# Patient Record
Sex: FEMALE | Race: WHITE | Employment: PART TIME | URBAN - METROPOLITAN AREA
[De-identification: names, ages, dates, MRNs, and addresses within clinical notes are randomized per-mention and may not be internally consistent; named-entity substitution may affect disease eponyms.]

---

## 2017-04-04 ENCOUNTER — OFFICE VISIT (OUTPATIENT)
Dept: INTERNAL MEDICINE CLINIC | Facility: CLINIC | Age: 57
End: 2017-04-04

## 2017-04-04 VITALS
BODY MASS INDEX: 34.52 KG/M2 | DIASTOLIC BLOOD PRESSURE: 68 MMHG | HEART RATE: 102 BPM | WEIGHT: 207.19 LBS | RESPIRATION RATE: 18 BRPM | HEIGHT: 65 IN | OXYGEN SATURATION: 99 % | TEMPERATURE: 99 F | SYSTOLIC BLOOD PRESSURE: 130 MMHG

## 2017-04-04 DIAGNOSIS — J45.20 ASTHMATIC BRONCHITIS, MILD INTERMITTENT, UNCOMPLICATED: Primary | ICD-10-CM

## 2017-04-04 PROCEDURE — 99213 OFFICE O/P EST LOW 20 MIN: CPT | Performed by: INTERNAL MEDICINE

## 2017-04-04 RX ORDER — PROMETHAZINE HYDROCHLORIDE AND CODEINE PHOSPHATE 6.25; 1 MG/5ML; MG/5ML
2.5 SYRUP ORAL EVERY 4 HOURS PRN
Qty: 180 ML | Refills: 0 | Status: SHIPPED | OUTPATIENT
Start: 2017-04-04 | End: 2017-04-15

## 2017-04-04 RX ORDER — INFLUENZA VIRUS VACCINE 15; 15; 15; 15 UG/.5ML; UG/.5ML; UG/.5ML; UG/.5ML
SUSPENSION INTRAMUSCULAR
Refills: 0 | COMMUNITY
Start: 2017-01-05 | End: 2017-04-04 | Stop reason: ALTCHOICE

## 2017-04-04 RX ORDER — AMOXICILLIN 875 MG/1
TABLET, COATED ORAL
COMMUNITY
Start: 2017-04-01 | End: 2017-05-09 | Stop reason: ALTCHOICE

## 2017-04-04 RX ORDER — HYDROCODONE BITARTRATE AND ACETAMINOPHEN 5; 325 MG/1; MG/1
TABLET ORAL
COMMUNITY
Start: 2017-04-01 | End: 2017-10-11

## 2017-04-04 RX ORDER — AZITHROMYCIN 250 MG/1
TABLET, FILM COATED ORAL
Qty: 6 TABLET | Refills: 0 | Status: SHIPPED | OUTPATIENT
Start: 2017-04-04 | End: 2017-05-09 | Stop reason: ALTCHOICE

## 2017-04-04 RX ORDER — LORAZEPAM 0.5 MG/1
0.5 TABLET ORAL 2 TIMES DAILY PRN
Qty: 20 TABLET | Refills: 0 | Status: SHIPPED | OUTPATIENT
Start: 2017-04-04 | End: 2017-11-03 | Stop reason: ALTCHOICE

## 2017-04-04 RX ORDER — AZITHROMYCIN 250 MG/1
TABLET, FILM COATED ORAL
Qty: 6 TABLET | Refills: 0 | Status: SHIPPED | OUTPATIENT
Start: 2017-04-04 | End: 2017-04-04

## 2017-04-04 NOTE — PROGRESS NOTES
HPI:    Patient ID: Abdulaziz aSwant is a 64year old female. HPIpt here for cough chest congestion and wheezing for almost a week. Is on Amoxil for root canal with Amoxil 875 mg bid. Review of Systems   Constitutional: Positive for fatigue.  Claudia Cover Referrals:  None       YR#[id number]

## 2017-04-15 ENCOUNTER — PATIENT MESSAGE (OUTPATIENT)
Dept: INTERNAL MEDICINE CLINIC | Facility: CLINIC | Age: 57
End: 2017-04-15

## 2017-04-15 NOTE — TELEPHONE ENCOUNTER
From: Stephane Alvarez  To: Kavitha Acosta MD  Sent: 4/15/2017 10:38 AM CDT  Subject: Prescription Question    good morning I'm finally starting to feel somewhat better but I'm out of cough syrup was wondering if i could a refill   thanks   kina iniguez

## 2017-04-17 RX ORDER — PROMETHAZINE HYDROCHLORIDE AND CODEINE PHOSPHATE 6.25; 1 MG/5ML; MG/5ML
2.5 SYRUP ORAL EVERY 4 HOURS PRN
Qty: 180 ML | Refills: 0 | Status: SHIPPED | OUTPATIENT
Start: 2017-04-17 | End: 2017-10-11

## 2017-04-27 ENCOUNTER — TELEPHONE (OUTPATIENT)
Dept: INTERNAL MEDICINE CLINIC | Facility: CLINIC | Age: 57
End: 2017-04-27

## 2017-05-09 ENCOUNTER — OFFICE VISIT (OUTPATIENT)
Dept: INTERNAL MEDICINE CLINIC | Facility: CLINIC | Age: 57
End: 2017-05-09

## 2017-05-09 VITALS
TEMPERATURE: 98 F | BODY MASS INDEX: 35 KG/M2 | SYSTOLIC BLOOD PRESSURE: 148 MMHG | HEART RATE: 71 BPM | OXYGEN SATURATION: 99 % | DIASTOLIC BLOOD PRESSURE: 90 MMHG | WEIGHT: 211.38 LBS

## 2017-05-09 DIAGNOSIS — R94.6 ABNORMAL RESULTS OF THYROID FUNCTION STUDIES: ICD-10-CM

## 2017-05-09 DIAGNOSIS — F51.01 PRIMARY INSOMNIA: ICD-10-CM

## 2017-05-09 DIAGNOSIS — Z00.00 ROUTINE GENERAL MEDICAL EXAMINATION AT A HEALTH CARE FACILITY: ICD-10-CM

## 2017-05-09 DIAGNOSIS — R03.0 ELEVATED BP WITHOUT DIAGNOSIS OF HYPERTENSION: ICD-10-CM

## 2017-05-09 DIAGNOSIS — R00.2 PALPITATIONS: Primary | ICD-10-CM

## 2017-05-09 PROCEDURE — 83735 ASSAY OF MAGNESIUM: CPT | Performed by: INTERNAL MEDICINE

## 2017-05-09 PROCEDURE — 80048 BASIC METABOLIC PNL TOTAL CA: CPT | Performed by: INTERNAL MEDICINE

## 2017-05-09 PROCEDURE — 84436 ASSAY OF TOTAL THYROXINE: CPT | Performed by: INTERNAL MEDICINE

## 2017-05-09 PROCEDURE — 99214 OFFICE O/P EST MOD 30 MIN: CPT | Performed by: INTERNAL MEDICINE

## 2017-05-09 PROCEDURE — 36415 COLL VENOUS BLD VENIPUNCTURE: CPT | Performed by: INTERNAL MEDICINE

## 2017-05-09 RX ORDER — ZOLPIDEM TARTRATE 10 MG/1
5 TABLET ORAL NIGHTLY
Qty: 30 TABLET | Refills: 0 | Status: SHIPPED | OUTPATIENT
Start: 2017-05-09 | End: 2017-08-08

## 2017-05-09 NOTE — PROGRESS NOTES
Pt presented to clinic today for blood draw. Per physician able to draw orders. Orders  documented within chart. Pt tolerated lab draw well.  verified.   Orders drawn include: t4, bmp, mg   Site of draw: rt catalino Mao CMA

## 2017-05-09 NOTE — PROGRESS NOTES
HPI:    Patient ID: Jersey Hinton is a 62year old female. HPIPt here for evaluation of rapid heart rates noted on her fit bit application. The rates go up to 170 on occasuion and no associated symptoms. Never with exercise.   No assocaited symto place, and time. She displays normal reflexes. Skin: No rash noted.    Psychiatric:   Very anxious apearing              ASSESSMENT/PLAN:   Palpitations  (primary encounter diagnosis) etiology likely anxiety - however will check lytes and tsh and refer fo

## 2017-05-16 ENCOUNTER — HOSPITAL ENCOUNTER (OUTPATIENT)
Dept: CV DIAGNOSTICS | Facility: HOSPITAL | Age: 57
Discharge: HOME | End: 2017-05-16
Attending: INTERNAL MEDICINE
Payer: COMMERCIAL

## 2017-05-16 DIAGNOSIS — R00.2 PALPITATIONS: ICD-10-CM

## 2017-05-16 PROCEDURE — 93227 XTRNL ECG REC<48 HR R&I: CPT | Performed by: INTERNAL MEDICINE

## 2017-05-18 ENCOUNTER — HOSPITAL ENCOUNTER (OUTPATIENT)
Dept: CV DIAGNOSTICS | Facility: HOSPITAL | Age: 57
Discharge: HOME | End: 2017-05-18
Attending: INTERNAL MEDICINE
Payer: COMMERCIAL

## 2017-05-18 PROCEDURE — 93225 XTRNL ECG REC<48 HRS REC: CPT | Performed by: INTERNAL MEDICINE

## 2017-05-25 PROCEDURE — 85025 COMPLETE CBC W/AUTO DIFF WBC: CPT | Performed by: FAMILY MEDICINE

## 2017-05-25 NOTE — PROGRESS NOTES
Shantell Ziegler is a 62year old female. CC:  Patient presents with: Other: Patient complains of hand tremors, dry throat, feeling light headed due to nervousiness or anxiety      HPI:    Pt c/o feeling lightheaded/ shakey lately.  Has been under ve History:  Past Medical History   Diagnosis Date   • Fibromyalgia    • Arthritis           Past Surgical History    REPAIR ROTATOR CUFF,ACUTE  2004    ELECTROCARDIOGRAM, COMPLETE  09/05/2013    Comment SCANNED TO MEDIA TAB - 09/05/2013    HYSTERECTOMY auscultation bilaterally  GI: Soft, non-tender, no rebound, no guarding.   PSYCH:  Alert and oriented x 3, affect appropriate, very ANXIOUS   SKIN: No rashes, no lesions  EXTREMITIES:  Bilaterally warm, no edema, no rashes  LYMPH:  No cervical lymphadenopat this encounter.      None

## 2017-05-26 ENCOUNTER — TELEPHONE (OUTPATIENT)
Dept: INTERNAL MEDICINE CLINIC | Facility: CLINIC | Age: 57
End: 2017-05-26

## 2017-05-26 NOTE — TELEPHONE ENCOUNTER
Pt's  verified  Pt states she received a nazia today from Dr. Dyllan Durbin re labs- has more questions about menopause results please call at  [phone]- informed Nurse not in office until Wednesday and that MD is not in office today as well.

## 2017-05-30 ENCOUNTER — PATIENT MESSAGE (OUTPATIENT)
Dept: INTERNAL MEDICINE CLINIC | Facility: CLINIC | Age: 57
End: 2017-05-30

## 2017-05-30 NOTE — TELEPHONE ENCOUNTER
From: Sravanthi Rodarte  To: Cecille Stack MD  Sent: 5/30/2017 6:03 AM CDT  Subject: Test Results Question    Did the blood test show menopause   Thanks   Berkley Sorensen

## 2017-06-21 RX ORDER — FLUTICASONE PROPIONATE 50 MCG
SPRAY, SUSPENSION (ML) NASAL
Qty: 1 BOTTLE | Refills: 6 | Status: SHIPPED | OUTPATIENT
Start: 2017-06-21 | End: 2019-10-04

## 2017-08-08 RX ORDER — ZOLPIDEM TARTRATE 10 MG/1
5 TABLET ORAL NIGHTLY
Qty: 30 TABLET | Refills: 0
Start: 2017-08-08 | End: 2017-08-17

## 2017-08-08 NOTE — TELEPHONE ENCOUNTER
From: Margaret Elias  Sent: 8/8/2017 7:06 AM CDT  Subject: Medication Renewal Request    Mariluz Lawson would like a refill of the following medications:  Zolpidem Tartrate 10 MG Oral Tab Bernardo Corrales MD]    Preferred pharmacy: [facility]ACY #[id number]

## 2017-08-18 RX ORDER — ZOLPIDEM TARTRATE 10 MG/1
TABLET ORAL
Qty: 30 TABLET | Refills: 0 | Status: SHIPPED | OUTPATIENT
Start: 2017-08-18 | End: 2019-09-06

## 2017-10-09 ENCOUNTER — OFFICE VISIT (OUTPATIENT)
Dept: FAMILY MEDICINE CLINIC | Facility: CLINIC | Age: 57
End: 2017-10-09

## 2017-10-09 VITALS
BODY MASS INDEX: 37 KG/M2 | TEMPERATURE: 98 F | OXYGEN SATURATION: 98 % | DIASTOLIC BLOOD PRESSURE: 80 MMHG | SYSTOLIC BLOOD PRESSURE: 130 MMHG | WEIGHT: 223 LBS | HEART RATE: 86 BPM | RESPIRATION RATE: 16 BRPM

## 2017-10-09 DIAGNOSIS — L85.3 DRY SKIN: ICD-10-CM

## 2017-10-09 DIAGNOSIS — L30.9 DERMATITIS: Primary | ICD-10-CM

## 2017-10-09 PROCEDURE — 99213 OFFICE O/P EST LOW 20 MIN: CPT | Performed by: PHYSICIAN ASSISTANT

## 2017-10-09 NOTE — PROGRESS NOTES
CHIEF COMPLAINT:   Patient presents with:  Derm Problem: started a week ago on right foot, a couple areas she was itching, mild area on left wrist      HPI:   Sravanthi Rodarte is a 62year old female who presents for evaluation of a skin rash .  Patient Past Medical History:   Diagnosis Date   • Arthritis    • Fibromyalgia       Past Surgical History:  2005: CHOLECYSTECTOMY      Comment: Open elidia.    09/05/2013: ELECTROCARDIOGRAM, COMPLETE      Comment: SCANNED TO MEDIA TAB - 09/05/2013  2008: Tash Le cervical, submandibular, or supraclavicular lymphadenopathy. No results found for this or any previous visit (from the past 24 hour(s)).     ASSESSMENT:   Gillian Turk is a 62year old female who presented for evaluation of a dermatological issu

## 2017-10-11 ENCOUNTER — OFFICE VISIT (OUTPATIENT)
Dept: INTERNAL MEDICINE CLINIC | Facility: CLINIC | Age: 57
End: 2017-10-11

## 2017-10-11 VITALS
RESPIRATION RATE: 14 BRPM | DIASTOLIC BLOOD PRESSURE: 82 MMHG | HEART RATE: 80 BPM | HEIGHT: 65 IN | SYSTOLIC BLOOD PRESSURE: 124 MMHG | TEMPERATURE: 98 F | OXYGEN SATURATION: 96 % | BODY MASS INDEX: 36.49 KG/M2 | WEIGHT: 219 LBS

## 2017-10-11 DIAGNOSIS — R21 RASH AND NONSPECIFIC SKIN ERUPTION: Primary | ICD-10-CM

## 2017-10-11 PROCEDURE — 99213 OFFICE O/P EST LOW 20 MIN: CPT | Performed by: FAMILY MEDICINE

## 2017-10-11 PROCEDURE — 85025 COMPLETE CBC W/AUTO DIFF WBC: CPT | Performed by: FAMILY MEDICINE

## 2017-10-11 PROCEDURE — 36415 COLL VENOUS BLD VENIPUNCTURE: CPT | Performed by: FAMILY MEDICINE

## 2017-10-11 NOTE — PATIENT INSTRUCTIONS
Cont hydrocortisone, please call if worsening or changing symptoms    Will call tmw with blood test results     Can cont zyrtec

## 2017-10-11 NOTE — PROGRESS NOTES
CC:  Derm Problem (c/c body rash spotting x 1 week)      Hx of CC:    Faint rash legs / arms for a week  A little itchy  In sar[place] a week ago  Feels well otherwise- no fevers , body aches, headaches  No new creams/ lotions/ detergents   Was prescribed hydr [OTHER] Other      hashimoto   • Diabetes Maternal Grandmother    • Heart Disease Maternal Grandmother      CAD   • Cancer Father         Relation Status Comments   Mo Alive    Other Alive    Fa Alive       Smoking status: Former Smoker Encounter      CBC W Differential W Platelet [E]

## 2017-10-18 ENCOUNTER — LAB ENCOUNTER (OUTPATIENT)
Dept: LAB | Age: 57
End: 2017-10-18
Attending: DERMATOLOGY
Payer: COMMERCIAL

## 2017-10-18 DIAGNOSIS — L30.9 DERMATITIS: Primary | ICD-10-CM

## 2017-10-18 PROCEDURE — 81003 URINALYSIS AUTO W/O SCOPE: CPT

## 2017-10-18 PROCEDURE — 36415 COLL VENOUS BLD VENIPUNCTURE: CPT

## 2017-10-18 PROCEDURE — 80053 COMPREHEN METABOLIC PANEL: CPT

## 2017-11-03 ENCOUNTER — TELEPHONE (OUTPATIENT)
Dept: INTERNAL MEDICINE CLINIC | Facility: CLINIC | Age: 57
End: 2017-11-03

## 2017-11-03 ENCOUNTER — OFFICE VISIT (OUTPATIENT)
Dept: INTERNAL MEDICINE CLINIC | Facility: CLINIC | Age: 57
End: 2017-11-03

## 2017-11-03 VITALS
HEART RATE: 71 BPM | DIASTOLIC BLOOD PRESSURE: 90 MMHG | HEIGHT: 65 IN | OXYGEN SATURATION: 97 % | WEIGHT: 230 LBS | BODY MASS INDEX: 38.32 KG/M2 | TEMPERATURE: 98 F | SYSTOLIC BLOOD PRESSURE: 132 MMHG

## 2017-11-03 DIAGNOSIS — F50.9 EATING DISORDER: ICD-10-CM

## 2017-11-03 DIAGNOSIS — F41.9 ANXIETY AND DEPRESSION: ICD-10-CM

## 2017-11-03 DIAGNOSIS — F32.A ANXIETY AND DEPRESSION: ICD-10-CM

## 2017-11-03 DIAGNOSIS — Z12.39 SCREENING FOR BREAST CANCER: ICD-10-CM

## 2017-11-03 DIAGNOSIS — M75.81 TENDINITIS OF RIGHT ROTATOR CUFF: Primary | ICD-10-CM

## 2017-11-03 PROCEDURE — 99214 OFFICE O/P EST MOD 30 MIN: CPT | Performed by: INTERNAL MEDICINE

## 2017-11-03 RX ORDER — MELOXICAM 15 MG/1
15 TABLET ORAL DAILY PRN
Qty: 30 TABLET | Refills: 0 | Status: SHIPPED | OUTPATIENT
Start: 2017-11-03 | End: 2018-01-18

## 2017-11-03 RX ORDER — CYCLOBENZAPRINE HCL 10 MG
10 TABLET ORAL NIGHTLY PRN
Qty: 10 TABLET | Refills: 0 | Status: SHIPPED | OUTPATIENT
Start: 2017-11-03 | End: 2018-01-18

## 2017-11-03 NOTE — PROGRESS NOTES
Shantell Ziegler is a 62year old female.     Chief complaint:  Right shoulder pain and anxiety   HPI:   62year old female with PMH as listed below here for   Right shoulder pain   Started   1 month ago   Works at [employer]   Pain 10/10   No numbne History:   Diagnosis Date   • Arthritis    • Fibromyalgia      Past Surgical History:  2005: CHOLECYSTECTOMY      Comment: Open elidia.    09/05/2013: ELECTROCARDIOGRAM, COMPLETE      Comment: SCANNED TO MEDIA TAB - 09/05/2013  2008: HYSTERECTOMY      Commen no gross deficits              No orders of the defined types were placed in this encounter. ASSESSMENT AND PLAN:   1. Tendinitis of right rotator cuff  mobic   - XR SHOULDER, COMPLETE (MIN 2 VIEWS), RIGHT (CPT=73030);  Future  - PHYSICAL THERAPY - I

## 2017-11-03 NOTE — TELEPHONE ENCOUNTER
Called patient she was prescribed Meloxicam 15mg Anti-inflammatory pain reliever, Flexeril muscle relaxant  and the sertraline, she did not see all three meds on her AVS upon review

## 2017-11-06 ENCOUNTER — TELEPHONE (OUTPATIENT)
Dept: INTERNAL MEDICINE CLINIC | Facility: CLINIC | Age: 57
End: 2017-11-06

## 2017-11-08 ENCOUNTER — TELEPHONE (OUTPATIENT)
Dept: INTERNAL MEDICINE CLINIC | Facility: CLINIC | Age: 57
End: 2017-11-08

## 2017-11-08 ENCOUNTER — HOSPITAL ENCOUNTER (OUTPATIENT)
Dept: GENERAL RADIOLOGY | Age: 57
Discharge: HOME | End: 2017-11-08
Attending: INTERNAL MEDICINE
Payer: COMMERCIAL

## 2017-11-08 DIAGNOSIS — R03.0 ELEVATED BLOOD PRESSURE READING: Primary | ICD-10-CM

## 2017-11-08 DIAGNOSIS — R94.6 ABNORMAL RESULTS OF THYROID FUNCTION STUDIES: ICD-10-CM

## 2017-11-08 DIAGNOSIS — M75.81 TENDINITIS OF RIGHT ROTATOR CUFF: ICD-10-CM

## 2017-11-08 PROCEDURE — 73030 X-RAY EXAM OF SHOULDER: CPT | Performed by: INTERNAL MEDICINE

## 2017-11-09 ENCOUNTER — NURSE ONLY (OUTPATIENT)
Dept: INTERNAL MEDICINE CLINIC | Facility: CLINIC | Age: 57
End: 2017-11-09

## 2017-11-09 DIAGNOSIS — R94.6 ABNORMAL RESULTS OF THYROID FUNCTION STUDIES: ICD-10-CM

## 2017-11-09 DIAGNOSIS — R03.0 ELEVATED BLOOD PRESSURE READING: ICD-10-CM

## 2017-11-09 PROCEDURE — 36415 COLL VENOUS BLD VENIPUNCTURE: CPT | Performed by: INTERNAL MEDICINE

## 2017-11-09 PROCEDURE — 84443 ASSAY THYROID STIM HORMONE: CPT | Performed by: INTERNAL MEDICINE

## 2017-11-09 PROCEDURE — 80061 LIPID PANEL: CPT | Performed by: INTERNAL MEDICINE

## 2017-11-09 PROCEDURE — 82306 VITAMIN D 25 HYDROXY: CPT | Performed by: INTERNAL MEDICINE

## 2017-11-09 PROCEDURE — 83036 HEMOGLOBIN GLYCOSYLATED A1C: CPT | Performed by: INTERNAL MEDICINE

## 2017-11-09 PROCEDURE — 82607 VITAMIN B-12: CPT | Performed by: INTERNAL MEDICINE

## 2017-11-10 ENCOUNTER — OFFICE VISIT (OUTPATIENT)
Dept: INTERNAL MEDICINE CLINIC | Facility: CLINIC | Age: 57
End: 2017-11-10

## 2017-11-10 ENCOUNTER — TELEPHONE (OUTPATIENT)
Dept: INTERNAL MEDICINE CLINIC | Facility: CLINIC | Age: 57
End: 2017-11-10

## 2017-11-10 VITALS
OXYGEN SATURATION: 99 % | SYSTOLIC BLOOD PRESSURE: 136 MMHG | WEIGHT: 229 LBS | DIASTOLIC BLOOD PRESSURE: 82 MMHG | HEART RATE: 77 BPM | HEIGHT: 65 IN | BODY MASS INDEX: 38.15 KG/M2 | TEMPERATURE: 97 F

## 2017-11-10 DIAGNOSIS — M25.511 ACUTE PAIN OF RIGHT SHOULDER: ICD-10-CM

## 2017-11-10 DIAGNOSIS — F41.9 ANXIETY AND DEPRESSION: ICD-10-CM

## 2017-11-10 DIAGNOSIS — IMO0001 CLASS 2 OBESITY WITH SERIOUS COMORBIDITY AND BODY MASS INDEX (BMI) OF 38.0 TO 38.9 IN ADULT, UNSPECIFIED OBESITY TYPE: Primary | ICD-10-CM

## 2017-11-10 DIAGNOSIS — F32.A ANXIETY AND DEPRESSION: ICD-10-CM

## 2017-11-10 PROCEDURE — 99214 OFFICE O/P EST MOD 30 MIN: CPT | Performed by: INTERNAL MEDICINE

## 2017-11-10 RX ORDER — PHENTERMINE HYDROCHLORIDE 15 MG/1
15 CAPSULE ORAL EVERY MORNING
Qty: 30 CAPSULE | Refills: 0 | Status: SHIPPED | OUTPATIENT
Start: 2017-11-10 | End: 2019-04-15 | Stop reason: ALTCHOICE

## 2017-11-10 RX ORDER — PHENTERMINE HYDROCHLORIDE 15 MG/1
15 CAPSULE ORAL EVERY MORNING
Qty: 30 CAPSULE | Refills: 0 | Status: SHIPPED | OUTPATIENT
Start: 2017-11-10 | End: 2018-12-04

## 2017-11-10 RX ORDER — ERGOCALCIFEROL 1.25 MG/1
50000 CAPSULE ORAL WEEKLY
Qty: 12 CAPSULE | Refills: 0 | Status: SHIPPED | OUTPATIENT
Start: 2017-11-10 | End: 2018-01-27

## 2017-11-10 RX ORDER — PHENTERMINE HYDROCHLORIDE 15 MG/1
15 CAPSULE ORAL EVERY MORNING
Qty: 30 CAPSULE | Refills: 0 | Status: SHIPPED | OUTPATIENT
Start: 2017-11-10 | End: 2017-11-10

## 2017-11-10 NOTE — PATIENT INSTRUCTIONS
Randolph Edouard to [facility] group weight loss clinic. We are excited that you are committed to improving your health and have invited our practice to be part of your journey.  Our approach to the medical management of weight loss is si water per day, add fiber ( benefiber) to the water to increase fullness, overcome constipation  - portion control   - Eat slowly and take 20 to 30 minutes to complete each meal  - Do not drink your calories ( no regular pop, juice, high calorie coffee drin health!     1.___________________________________________________________    2.___________________________________________________________    3.___________________________________________________________    4.________________________________________________ pounds by this time next year!

## 2017-11-10 NOTE — PROGRESS NOTES
Tunde Goel is a 62year old female.     Chief complaint:weight loss management and obesity related comorbidities  HPI:   With PMH as listed below here for medical weight loss management    has been suffering her whole life   Gained more weight in t Rfl: 0   ZOLPIDEM TARTRATE 10 MG Oral Tab TAKE 1 TABLET BY MOUTH NIGHTLY Disp: 30 tablet Rfl: 0   FLUTICASONE PROPIONATE 50 MCG/ACT Nasal Suspension INSTILL 2 SPRAYS IN EACH NOSTRIL DAILY Disp: 1 Bottle Rfl: 6   omeprazole 20 MG Oral Capsule Delayed Jin negatives noted in the the HPI  GEN:  No fever or fatigue  HEAD:  No headaches  EYES:  No vision change  EARS:  No hearing loss  MOUTH/THROAT:  No sore throat or dental problems  HEART:  No chest pain or palpitations  LUNG:  No SOB, cough or wheeze  GI:  N calories ( no regular pop, juice, high calorie coffee drinks, limit alcohol)  - Do not eat late at night  - Exercise- at least 3 times per week ( goal is 150 min/week)  Will start phentermine discussed side effects including but not limited to:( elevated B

## 2017-11-16 ENCOUNTER — OFFICE VISIT (OUTPATIENT)
Dept: INTERNAL MEDICINE CLINIC | Facility: CLINIC | Age: 57
End: 2017-11-16

## 2017-11-16 VITALS
DIASTOLIC BLOOD PRESSURE: 80 MMHG | HEIGHT: 65 IN | WEIGHT: 225 LBS | SYSTOLIC BLOOD PRESSURE: 110 MMHG | BODY MASS INDEX: 37.49 KG/M2

## 2017-11-16 DIAGNOSIS — Z48.02 VISIT FOR SUTURE REMOVAL: Primary | ICD-10-CM

## 2017-11-16 PROCEDURE — 99211 OFF/OP EST MAY X REQ PHY/QHP: CPT | Performed by: FAMILY MEDICINE

## 2018-01-18 RX ORDER — MELOXICAM 15 MG/1
15 TABLET ORAL DAILY PRN
Qty: 30 TABLET | Refills: 0 | Status: SHIPPED
Start: 2018-01-18 | End: 2019-04-15 | Stop reason: ALTCHOICE

## 2018-01-18 RX ORDER — CYCLOBENZAPRINE HCL 10 MG
10 TABLET ORAL NIGHTLY PRN
Qty: 30 TABLET | Refills: 0 | Status: SHIPPED
Start: 2018-01-18 | End: 2021-12-16

## 2018-01-18 NOTE — TELEPHONE ENCOUNTER
From: Lou Murray  Sent: 1/18/2018 11:57 AM CST  Subject: Medication Renewal Request    Lucinda Luque would like a refill of the following medications:     Cyclobenzaprine HCl 10 MG Oral Tab Heather Howard MD]    [facility]

## 2018-02-01 ENCOUNTER — HOSPITAL ENCOUNTER (OUTPATIENT)
Dept: MAMMOGRAPHY | Age: 58
Discharge: HOME | End: 2018-02-01
Attending: INTERNAL MEDICINE
Payer: COMMERCIAL

## 2018-02-01 DIAGNOSIS — Z12.39 SCREENING FOR BREAST CANCER: ICD-10-CM

## 2018-02-01 PROCEDURE — 77067 SCR MAMMO BI INCL CAD: CPT | Performed by: INTERNAL MEDICINE

## 2018-02-05 ENCOUNTER — TELEPHONE (OUTPATIENT)
Dept: INTERNAL MEDICINE CLINIC | Facility: CLINIC | Age: 58
End: 2018-02-05

## 2018-02-05 NOTE — TELEPHONE ENCOUNTER
Pt wants to know what is going on with her having to come in for another test, please give her a call ASAP.

## 2018-02-12 ENCOUNTER — HOSPITAL ENCOUNTER (OUTPATIENT)
Dept: MAMMOGRAPHY | Facility: HOSPITAL | Age: 58
Discharge: HOME | End: 2018-02-12
Attending: INTERNAL MEDICINE
Payer: COMMERCIAL

## 2018-02-12 DIAGNOSIS — R92.8 ABNORMAL MAMMOGRAM: ICD-10-CM

## 2018-02-12 PROCEDURE — 77066 DX MAMMO INCL CAD BI: CPT | Performed by: INTERNAL MEDICINE

## 2018-06-08 RX ORDER — VALACYCLOVIR HYDROCHLORIDE 1 G/1
TABLET, FILM COATED ORAL
Qty: 48 TABLET | Refills: 0 | Status: SHIPPED | OUTPATIENT
Start: 2018-06-08 | End: 2018-06-28

## 2018-06-29 RX ORDER — VALACYCLOVIR HYDROCHLORIDE 1 G/1
TABLET, FILM COATED ORAL
Qty: 48 TABLET | Refills: 0 | Status: SHIPPED | OUTPATIENT
Start: 2018-06-29 | End: 2018-07-25

## 2018-07-25 RX ORDER — VALACYCLOVIR HYDROCHLORIDE 1 G/1
TABLET, FILM COATED ORAL
Qty: 48 TABLET | Refills: 0 | Status: SHIPPED | OUTPATIENT
Start: 2018-07-25 | End: 2018-10-18

## 2018-10-18 RX ORDER — VALACYCLOVIR HYDROCHLORIDE 1 G/1
TABLET, FILM COATED ORAL
Qty: 48 TABLET | Refills: 0 | Status: SHIPPED | OUTPATIENT
Start: 2018-10-18 | End: 2019-08-22

## 2018-12-04 ENCOUNTER — OFFICE VISIT (OUTPATIENT)
Dept: INTERNAL MEDICINE CLINIC | Facility: CLINIC | Age: 58
End: 2018-12-04
Payer: COMMERCIAL

## 2018-12-04 VITALS
HEART RATE: 75 BPM | DIASTOLIC BLOOD PRESSURE: 86 MMHG | WEIGHT: 216 LBS | TEMPERATURE: 98 F | OXYGEN SATURATION: 98 % | SYSTOLIC BLOOD PRESSURE: 136 MMHG | HEIGHT: 65 IN | RESPIRATION RATE: 18 BRPM | BODY MASS INDEX: 35.99 KG/M2

## 2018-12-04 DIAGNOSIS — K21.9 GASTROESOPHAGEAL REFLUX DISEASE, ESOPHAGITIS PRESENCE NOT SPECIFIED: ICD-10-CM

## 2018-12-04 DIAGNOSIS — Z00.00 ROUTINE GENERAL MEDICAL EXAMINATION AT A HEALTH CARE FACILITY: ICD-10-CM

## 2018-12-04 DIAGNOSIS — F41.1 GENERALIZED ANXIETY DISORDER: ICD-10-CM

## 2018-12-04 DIAGNOSIS — Z13.6 SCREENING FOR HEART DISEASE: ICD-10-CM

## 2018-12-04 DIAGNOSIS — F41.9 ANXIETY AND DEPRESSION: ICD-10-CM

## 2018-12-04 DIAGNOSIS — B96.89 ACUTE BACTERIAL RHINOSINUSITIS: Primary | ICD-10-CM

## 2018-12-04 DIAGNOSIS — M25.50 POLYARTHRALGIA: ICD-10-CM

## 2018-12-04 DIAGNOSIS — J01.90 ACUTE BACTERIAL RHINOSINUSITIS: Primary | ICD-10-CM

## 2018-12-04 DIAGNOSIS — F32.A ANXIETY AND DEPRESSION: ICD-10-CM

## 2018-12-04 PROCEDURE — 80061 LIPID PANEL: CPT | Performed by: INTERNAL MEDICINE

## 2018-12-04 PROCEDURE — 86200 CCP ANTIBODY: CPT | Performed by: INTERNAL MEDICINE

## 2018-12-04 PROCEDURE — 80050 GENERAL HEALTH PANEL: CPT | Performed by: INTERNAL MEDICINE

## 2018-12-04 PROCEDURE — 86431 RHEUMATOID FACTOR QUANT: CPT | Performed by: INTERNAL MEDICINE

## 2018-12-04 PROCEDURE — 85652 RBC SED RATE AUTOMATED: CPT | Performed by: INTERNAL MEDICINE

## 2018-12-04 PROCEDURE — 86038 ANTINUCLEAR ANTIBODIES: CPT | Performed by: INTERNAL MEDICINE

## 2018-12-04 PROCEDURE — 99214 OFFICE O/P EST MOD 30 MIN: CPT | Performed by: INTERNAL MEDICINE

## 2018-12-04 PROCEDURE — 36415 COLL VENOUS BLD VENIPUNCTURE: CPT | Performed by: INTERNAL MEDICINE

## 2018-12-04 RX ORDER — FLUTICASONE PROPIONATE 50 MCG
2 SPRAY, SUSPENSION (ML) NASAL DAILY
Qty: 1 BOTTLE | Refills: 1 | Status: SHIPPED | OUTPATIENT
Start: 2018-12-04 | End: 2019-02-19

## 2018-12-04 RX ORDER — AMOXICILLIN AND CLAVULANATE POTASSIUM 875; 125 MG/1; MG/1
1 TABLET, FILM COATED ORAL 2 TIMES DAILY
Qty: 20 TABLET | Refills: 0 | Status: SHIPPED | OUTPATIENT
Start: 2018-12-04 | End: 2018-12-14

## 2018-12-04 RX ORDER — LORAZEPAM 1 MG/1
1 TABLET ORAL 2 TIMES DAILY PRN
Qty: 30 TABLET | Refills: 0 | Status: SHIPPED | OUTPATIENT
Start: 2018-12-04 | End: 2019-09-06

## 2018-12-04 RX ORDER — OMEPRAZOLE 20 MG/1
20 CAPSULE, DELAYED RELEASE ORAL
Qty: 90 CAPSULE | Refills: 1 | Status: SHIPPED | OUTPATIENT
Start: 2018-12-04 | End: 2019-03-04

## 2018-12-04 RX ORDER — METHYLPREDNISOLONE 4 MG/1
TABLET ORAL
Qty: 1 KIT | Refills: 0 | Status: SHIPPED | OUTPATIENT
Start: 2018-12-04 | End: 2019-02-19

## 2018-12-04 NOTE — PROGRESS NOTES
Pt presented to clinic today for blood draw. Per physician able to draw orders. Orders  documented within chart. Pt tolerated lab draw well.  verified.   Orders drawn include: rheumatoid, cyclic, sed rate, ly, tsh, lipid, cmp, cbc  Site of draw: left de

## 2018-12-04 NOTE — PATIENT INSTRUCTIONS
Sertraline tablets  Brand Name: Zoloft  What is this medicine? SERTRALINE (SER tra yuval) is used to treat depression.  It may also be used to treat obsessive compulsive disorder, panic disorder, post-trauma stress, premenstrual dysphoric disorder (PMDD) Side effects that usually do not require medical attention (report to your doctor or health care professional if they continue or are bothersome):  · change in appetite or weight  · change in sex drive or performance  · diarrhea  · increased sweating  · in Keep out of the reach of children. Store at room temperature between 15 and 30 degrees C (59 and 86 degrees F). Throw away any unused medicine after the expiration date. What should I tell my health care provider before I take this medicine?   They need t You may get drowsy or dizzy. Do not drive, use machinery, or do anything that needs mental alertness until you know how this medicine affects you. Do not stand or sit up quickly, especially if you are an older patient.  This reduces the risk of dizzy or yesenia

## 2018-12-04 NOTE — PROGRESS NOTES
Olman Hays is a 62year old female.     Chief complaint: sinus congestion and anxiety     HPI:   62year old female with PMH as listed below here for  Sinus congestion and anxiety     Sinus pressure   2-3 weeks ago   Congested   Plugged ears  Stuff Wheezing. Disp: 1 Inhaler Rfl: 0      Past Medical History:   Diagnosis Date   • Anxiety and depression 11/10/2017   • Arthritis    • Fibromyalgia      Past Surgical History:   Procedure Laterality Date   • CHOLECYSTECTOMY  2005    Open elidia.     • ELECTRO supple,no adenopathy  LUNGS: clear to auscultation  CARDIO: RRR without murmur  GI: good BS's,no masses, HSM or tenderness  EXTREMITIES: no cyanosis, clubbing or edema  NEURO: no gross deficits              No orders of the defined types were placed in [facility] symptoms   Rajesh Boykin MD,   Diplomate of the [employer] of Internal Medicine  Diplomate of the American Board of Obesity Medicine

## 2019-01-09 ENCOUNTER — OFFICE VISIT (OUTPATIENT)
Dept: INTERNAL MEDICINE CLINIC | Facility: CLINIC | Age: 59
End: 2019-01-09
Payer: COMMERCIAL

## 2019-01-09 VITALS
DIASTOLIC BLOOD PRESSURE: 84 MMHG | HEIGHT: 65 IN | OXYGEN SATURATION: 98 % | BODY MASS INDEX: 36.76 KG/M2 | RESPIRATION RATE: 17 BRPM | SYSTOLIC BLOOD PRESSURE: 132 MMHG | HEART RATE: 91 BPM | WEIGHT: 220.63 LBS | TEMPERATURE: 98 F

## 2019-01-09 DIAGNOSIS — N63.10 LUMP OF RIGHT BREAST: ICD-10-CM

## 2019-01-09 DIAGNOSIS — Z90.711 S/P PARTIAL HYSTERECTOMY: ICD-10-CM

## 2019-01-09 DIAGNOSIS — Z12.11 SCREENING FOR COLON CANCER: ICD-10-CM

## 2019-01-09 DIAGNOSIS — K21.9 GASTROESOPHAGEAL REFLUX DISEASE, ESOPHAGITIS PRESENCE NOT SPECIFIED: ICD-10-CM

## 2019-01-09 DIAGNOSIS — Z00.00 ANNUAL PHYSICAL EXAM: Primary | ICD-10-CM

## 2019-01-09 DIAGNOSIS — N89.8 VAGINAL DISCHARGE: ICD-10-CM

## 2019-01-09 DIAGNOSIS — B35.9 TINEA: ICD-10-CM

## 2019-01-09 PROCEDURE — 88175 CYTOPATH C/V AUTO FLUID REDO: CPT | Performed by: INTERNAL MEDICINE

## 2019-01-09 PROCEDURE — 99396 PREV VISIT EST AGE 40-64: CPT | Performed by: INTERNAL MEDICINE

## 2019-01-09 RX ORDER — CLOTRIMAZOLE 1 %
1 CREAM (GRAM) TOPICAL 2 TIMES DAILY
Qty: 1 TUBE | Refills: 1 | Status: SHIPPED | OUTPATIENT
Start: 2019-01-09 | End: 2019-05-21

## 2019-01-09 RX ORDER — FLUCONAZOLE 150 MG/1
150 TABLET ORAL
Qty: 3 TABLET | Refills: 0 | Status: SHIPPED | OUTPATIENT
Start: 2019-01-09 | End: 2019-04-15 | Stop reason: ALTCHOICE

## 2019-01-09 RX ORDER — OMEPRAZOLE 40 MG/1
40 CAPSULE, DELAYED RELEASE ORAL DAILY
Qty: 90 CAPSULE | Refills: 1 | Status: SHIPPED | OUTPATIENT
Start: 2019-01-09 | End: 2019-07-06

## 2019-02-04 DIAGNOSIS — F32.A ANXIETY AND DEPRESSION: ICD-10-CM

## 2019-02-04 DIAGNOSIS — B96.89 ACUTE BACTERIAL RHINOSINUSITIS: ICD-10-CM

## 2019-02-04 DIAGNOSIS — J01.90 ACUTE BACTERIAL RHINOSINUSITIS: ICD-10-CM

## 2019-02-04 DIAGNOSIS — F41.1 GENERALIZED ANXIETY DISORDER: ICD-10-CM

## 2019-02-04 DIAGNOSIS — Z00.00 ROUTINE GENERAL MEDICAL EXAMINATION AT A HEALTH CARE FACILITY: ICD-10-CM

## 2019-02-04 DIAGNOSIS — K21.9 GASTROESOPHAGEAL REFLUX DISEASE, ESOPHAGITIS PRESENCE NOT SPECIFIED: ICD-10-CM

## 2019-02-04 DIAGNOSIS — M25.50 POLYARTHRALGIA: ICD-10-CM

## 2019-02-04 DIAGNOSIS — F41.9 ANXIETY AND DEPRESSION: ICD-10-CM

## 2019-02-04 DIAGNOSIS — Z13.6 SCREENING FOR HEART DISEASE: ICD-10-CM

## 2019-02-04 NOTE — TELEPHONE ENCOUNTER
Requested Prescriptions     Pending Prescriptions Disp Refills   • SERTRALINE HCL 50 MG Oral Tab [Pharmacy Med Name: SERTRALINE HCL 50 MG TABLET] 30 tablet 1     Sig: TAKE 1 TABLET BY MOUTH EVERY DAY     Last office visit: 1-9-19  Medication last refilled:

## 2019-02-19 DIAGNOSIS — K21.9 GASTROESOPHAGEAL REFLUX DISEASE, ESOPHAGITIS PRESENCE NOT SPECIFIED: ICD-10-CM

## 2019-02-19 DIAGNOSIS — F32.A ANXIETY AND DEPRESSION: ICD-10-CM

## 2019-02-19 DIAGNOSIS — M25.50 POLYARTHRALGIA: ICD-10-CM

## 2019-02-19 DIAGNOSIS — F41.9 ANXIETY AND DEPRESSION: ICD-10-CM

## 2019-02-19 DIAGNOSIS — F41.1 GENERALIZED ANXIETY DISORDER: ICD-10-CM

## 2019-02-19 DIAGNOSIS — Z13.6 SCREENING FOR HEART DISEASE: ICD-10-CM

## 2019-02-19 DIAGNOSIS — J01.90 ACUTE BACTERIAL RHINOSINUSITIS: ICD-10-CM

## 2019-02-19 DIAGNOSIS — B96.89 ACUTE BACTERIAL RHINOSINUSITIS: ICD-10-CM

## 2019-02-19 DIAGNOSIS — Z00.00 ROUTINE GENERAL MEDICAL EXAMINATION AT A HEALTH CARE FACILITY: ICD-10-CM

## 2019-02-19 RX ORDER — FLUTICASONE PROPIONATE 50 MCG
2 SPRAY, SUSPENSION (ML) NASAL DAILY
Qty: 1 BOTTLE | Refills: 1 | Status: SHIPPED | OUTPATIENT
Start: 2019-02-19 | End: 2019-04-15

## 2019-02-20 RX ORDER — METHYLPREDNISOLONE 4 MG/1
TABLET ORAL
Qty: 1 KIT | Refills: 0 | Status: SHIPPED | OUTPATIENT
Start: 2019-02-20 | End: 2019-04-15 | Stop reason: ALTCHOICE

## 2019-04-15 ENCOUNTER — OFFICE VISIT (OUTPATIENT)
Dept: FAMILY MEDICINE CLINIC | Facility: CLINIC | Age: 59
End: 2019-04-15
Payer: COMMERCIAL

## 2019-04-15 VITALS
HEART RATE: 84 BPM | OXYGEN SATURATION: 98 % | DIASTOLIC BLOOD PRESSURE: 78 MMHG | BODY MASS INDEX: 38.24 KG/M2 | RESPIRATION RATE: 14 BRPM | WEIGHT: 224 LBS | TEMPERATURE: 98 F | HEIGHT: 64 IN | SYSTOLIC BLOOD PRESSURE: 144 MMHG

## 2019-04-15 DIAGNOSIS — J06.9 VIRAL URI WITH COUGH: Primary | ICD-10-CM

## 2019-04-15 PROCEDURE — 99213 OFFICE O/P EST LOW 20 MIN: CPT | Performed by: NURSE PRACTITIONER

## 2019-04-15 RX ORDER — BENZONATATE 200 MG/1
200 CAPSULE ORAL 3 TIMES DAILY PRN
Qty: 30 CAPSULE | Refills: 0 | Status: SHIPPED | OUTPATIENT
Start: 2019-04-15 | End: 2019-04-25

## 2019-04-15 RX ORDER — CLOTRIMAZOLE 1 %
CREAM (GRAM) TOPICAL
Refills: 1 | COMMUNITY
Start: 2019-02-19

## 2019-04-15 NOTE — PROGRESS NOTES
CHIEF COMPLAINT:   Patient presents with:  Cough: started today      HPI:   Sharyle Field is a 62year old female who presents for uri symptoms for  3 days. Patient reports congestion, dry cough, cough is keeping pt up at night.  Symptoms have been co Diagnosis Date   • Anxiety and depression 11/10/2017   • Arthritis    • Fibromyalgia       Past Surgical History:   Procedure Laterality Date   • CHOLECYSTECTOMY  2005    Open elidia.     • ELECTROCARDIOGRAM, COMPLETE  09/05/2013    SCANNED TO MEDIA TAB - 09 THROAT: Oral mucosa pink, moist. Posterior pharynx is mildly erythematous. no exudates. Tonsils 1/4. NECK: Supple, non-tender. LUNGS: clear to auscultation bilaterally, no wheezes or rhonchi. Breathing is non labored.   CARDIO: RRR without murmur  LYMPH · Sinus congestion/Post-nasal-drip: OTC Nasacort or Flonase (steroid nasal spray) nightly for 2 weeks. May take Sudafed (D) or Sudafed-PE, if not contraindicated (do not take if you have HTN).    · Pain/discomfort:  May use Tylenol or Ibuprofen or Aleve, if · You may use acetaminophen or ibuprofen to control pain and fever, unless another medicine was prescribed. If you have chronic liver or kidney disease, have ever had a stomach ulcer or gastrointestinal bleeding, or are taking blood-thinning medicines, darrel Colds are caused by viruses. They can't be cured with antibiotics. However, you can ease symptoms and support your body's efforts to heal itself.  No matter which symptoms you have, be sure to:  · Drink plenty of fluids (water or clear soup)  · Stop smoking When you first notice symptoms, ask your healthcare provider if antiviral medicines are appropriate. Antibiotics should not be taken for colds or flu.  Also, call your healthcare provider if you have any of the following symptoms or if you aren't feeling be

## 2019-04-16 DIAGNOSIS — F41.9 ANXIETY AND DEPRESSION: ICD-10-CM

## 2019-04-16 DIAGNOSIS — M25.50 POLYARTHRALGIA: ICD-10-CM

## 2019-04-16 DIAGNOSIS — J01.90 ACUTE BACTERIAL RHINOSINUSITIS: ICD-10-CM

## 2019-04-16 DIAGNOSIS — Z13.6 SCREENING FOR HEART DISEASE: ICD-10-CM

## 2019-04-16 DIAGNOSIS — F32.A ANXIETY AND DEPRESSION: ICD-10-CM

## 2019-04-16 DIAGNOSIS — B96.89 ACUTE BACTERIAL RHINOSINUSITIS: ICD-10-CM

## 2019-04-16 DIAGNOSIS — F41.1 GENERALIZED ANXIETY DISORDER: ICD-10-CM

## 2019-04-16 DIAGNOSIS — K21.9 GASTROESOPHAGEAL REFLUX DISEASE, ESOPHAGITIS PRESENCE NOT SPECIFIED: ICD-10-CM

## 2019-04-16 DIAGNOSIS — Z00.00 ROUTINE GENERAL MEDICAL EXAMINATION AT A HEALTH CARE FACILITY: ICD-10-CM

## 2019-04-16 RX ORDER — FLUTICASONE PROPIONATE 50 MCG
SPRAY, SUSPENSION (ML) NASAL
Qty: 1 BOTTLE | Refills: 1 | Status: SHIPPED | OUTPATIENT
Start: 2019-04-16 | End: 2020-01-22

## 2019-05-21 DIAGNOSIS — B35.9 TINEA: ICD-10-CM

## 2019-05-21 DIAGNOSIS — N63.10 LUMP OF RIGHT BREAST: ICD-10-CM

## 2019-05-21 DIAGNOSIS — Z12.11 SCREENING FOR COLON CANCER: ICD-10-CM

## 2019-05-21 DIAGNOSIS — Z00.00 ANNUAL PHYSICAL EXAM: ICD-10-CM

## 2019-05-21 DIAGNOSIS — N89.8 VAGINAL DISCHARGE: ICD-10-CM

## 2019-05-21 DIAGNOSIS — K21.9 GASTROESOPHAGEAL REFLUX DISEASE, ESOPHAGITIS PRESENCE NOT SPECIFIED: ICD-10-CM

## 2019-05-21 DIAGNOSIS — Z90.711 S/P PARTIAL HYSTERECTOMY: ICD-10-CM

## 2019-05-21 RX ORDER — CLOTRIMAZOLE 1 %
1 CREAM (GRAM) TOPICAL 2 TIMES DAILY
Qty: 45 G | Refills: 1 | Status: SHIPPED | OUTPATIENT
Start: 2019-05-21 | End: 2019-06-04

## 2019-07-06 DIAGNOSIS — Z00.00 ANNUAL PHYSICAL EXAM: ICD-10-CM

## 2019-07-06 DIAGNOSIS — B35.9 TINEA: ICD-10-CM

## 2019-07-06 DIAGNOSIS — N89.8 VAGINAL DISCHARGE: ICD-10-CM

## 2019-07-06 DIAGNOSIS — K21.9 GASTROESOPHAGEAL REFLUX DISEASE, ESOPHAGITIS PRESENCE NOT SPECIFIED: ICD-10-CM

## 2019-07-06 DIAGNOSIS — Z12.11 SCREENING FOR COLON CANCER: ICD-10-CM

## 2019-07-06 DIAGNOSIS — N63.10 LUMP OF RIGHT BREAST: ICD-10-CM

## 2019-07-06 DIAGNOSIS — Z90.711 S/P PARTIAL HYSTERECTOMY: ICD-10-CM

## 2019-07-09 RX ORDER — OMEPRAZOLE 40 MG/1
CAPSULE, DELAYED RELEASE ORAL
Qty: 90 CAPSULE | Refills: 1 | Status: SHIPPED | OUTPATIENT
Start: 2019-07-09 | End: 2019-12-16

## 2019-08-22 RX ORDER — VALACYCLOVIR HYDROCHLORIDE 1 G/1
TABLET, FILM COATED ORAL
Qty: 48 TABLET | Refills: 0 | Status: SHIPPED | OUTPATIENT
Start: 2019-08-22 | End: 2020-03-10

## 2019-09-06 DIAGNOSIS — F32.A ANXIETY AND DEPRESSION: ICD-10-CM

## 2019-09-06 DIAGNOSIS — F41.9 ANXIETY AND DEPRESSION: ICD-10-CM

## 2019-09-06 DIAGNOSIS — J01.90 ACUTE BACTERIAL RHINOSINUSITIS: ICD-10-CM

## 2019-09-06 DIAGNOSIS — F41.1 GENERALIZED ANXIETY DISORDER: ICD-10-CM

## 2019-09-06 DIAGNOSIS — B96.89 ACUTE BACTERIAL RHINOSINUSITIS: ICD-10-CM

## 2019-09-06 DIAGNOSIS — M25.50 POLYARTHRALGIA: ICD-10-CM

## 2019-09-06 DIAGNOSIS — K21.9 GASTROESOPHAGEAL REFLUX DISEASE, ESOPHAGITIS PRESENCE NOT SPECIFIED: ICD-10-CM

## 2019-09-06 DIAGNOSIS — Z13.6 SCREENING FOR HEART DISEASE: ICD-10-CM

## 2019-09-06 DIAGNOSIS — Z00.00 ROUTINE GENERAL MEDICAL EXAMINATION AT A HEALTH CARE FACILITY: ICD-10-CM

## 2019-09-09 RX ORDER — LORAZEPAM 1 MG/1
1 TABLET ORAL 2 TIMES DAILY PRN
Qty: 30 TABLET | Refills: 0 | Status: SHIPPED | OUTPATIENT
Start: 2019-09-09 | End: 2019-10-25

## 2019-09-09 RX ORDER — ZOLPIDEM TARTRATE 10 MG/1
10 TABLET ORAL NIGHTLY PRN
Qty: 30 TABLET | Refills: 0 | Status: SHIPPED | OUTPATIENT
Start: 2019-09-09 | End: 2021-12-16

## 2019-09-12 RX ORDER — CLOTRIMAZOLE 1 %
1 CREAM (GRAM) TOPICAL 2 TIMES DAILY
Qty: 1 TUBE | Refills: 1 | Status: SHIPPED | OUTPATIENT
Start: 2019-09-12 | End: 2019-09-26

## 2019-09-12 RX ORDER — FLUCONAZOLE 150 MG/1
150 TABLET ORAL
Qty: 3 TABLET | Refills: 0 | Status: SHIPPED | OUTPATIENT
Start: 2019-09-12 | End: 2021-12-16

## 2019-10-04 ENCOUNTER — OFFICE VISIT (OUTPATIENT)
Dept: FAMILY MEDICINE CLINIC | Facility: CLINIC | Age: 59
End: 2019-10-04
Payer: COMMERCIAL

## 2019-10-04 VITALS
OXYGEN SATURATION: 99 % | DIASTOLIC BLOOD PRESSURE: 74 MMHG | HEART RATE: 80 BPM | BODY MASS INDEX: 38.79 KG/M2 | TEMPERATURE: 99 F | HEIGHT: 64.5 IN | SYSTOLIC BLOOD PRESSURE: 126 MMHG | RESPIRATION RATE: 16 BRPM | WEIGHT: 230 LBS

## 2019-10-04 DIAGNOSIS — K21.9 GASTROESOPHAGEAL REFLUX DISEASE, ESOPHAGITIS PRESENCE NOT SPECIFIED: ICD-10-CM

## 2019-10-04 DIAGNOSIS — M94.0 COSTOCHONDRITIS: Primary | ICD-10-CM

## 2019-10-04 DIAGNOSIS — Z87.891 FORMER SMOKER: ICD-10-CM

## 2019-10-04 PROCEDURE — 99213 OFFICE O/P EST LOW 20 MIN: CPT | Performed by: NURSE PRACTITIONER

## 2019-10-04 RX ORDER — MELOXICAM 7.5 MG/1
TABLET ORAL
Refills: 1 | COMMUNITY
Start: 2019-08-21

## 2019-10-04 RX ORDER — PREDNISONE 20 MG/1
40 TABLET ORAL DAILY
Qty: 10 TABLET | Refills: 0 | Status: SHIPPED | OUTPATIENT
Start: 2019-10-04 | End: 2019-10-09

## 2019-10-04 NOTE — PATIENT INSTRUCTIONS
Costochondritis    Costochondritis is inflammation of a rib or the cartilage that connects a rib to your breastbone (sternum). It causes tenderness, and sometimes chest pain may be sharp or aching, or it may feel like pressure.  Pain may get worse with de Call the healthcare provider right away if you have any of the following:  · Pain that is not relieved by medicine  · Shortness of breath  · Lightheadedness, dizziness, or fainting  · Feeling of irregular heartbeat or fast pulse  Anyone with chest pain raza · Limit or avoid fatty, fried, and spicy foods, as well as coffee, chocolate, mint, and foods with high acid content such as tomatoes and citrus fruit and juices (orange, grapefruit, lemon). · Don’t eat large meals, especially at night.  Frequent, smaller · An over-the-counter trial of medicine doesn't relieve your symptoms  · Weight loss that can't be explained  · Trouble or pain swallowing  · Frequent vomiting (can’t keep down liquids)  · Blood in the stool or vomit (red or black in color)  · Feeling weak · Any other foods that seem to irritate your stomach or cause you pain  Relieve the pressure  Tips include the following:  · Eat smaller meals, even if you have to eat more often. · Don’t lie down right after you eat.  Wait a few hours for your stomach to These also cause the stomach to make less acid. They reduce stomach acid more than H-2 blockers. They may be used for a short time, or longer to treat certain conditions. You can buy some of them over the counter. Or your provider may prescribe them.  They

## 2019-10-04 NOTE — PROGRESS NOTES
CHIEF COMPLAINT:     Patient presents with:  Reflux: for past 1 week with burping  Musculoskeletal Problem: rib pain when lying on right side, intermittent sharp pain         HPI:     Margaret Elias is a 61year old female presents with slight chest p 01/26/2012 25     ALT (U/L)   Date Value   12/04/2018 23   10/18/2017 18   07/22/2014 25         Current Outpatient Medications:  predniSONE 20 MG Oral Tab Take 2 tablets (40 mg total) by mouth daily for 5 days. Take 2 tablets once a day with food.   Avoid Open elidia. • ELECTROCARDIOGRAM, COMPLETE  09/05/2013    SCANNED TO MEDIA TAB - 09/05/2013   • HYSTERECTOMY  2008     partial, No BSO, no abNL paps.     • REPAIR ROTATOR CUFF,ACUTE  2004      Social History:    Social History    Tobacco Use      Smoking Requested Prescriptions     Signed Prescriptions Disp Refills   • predniSONE 20 MG Oral Tab 10 tablet 0     Sig: Take 2 tablets (40 mg total) by mouth daily for 5 days. Take 2 tablets once a day with food. Avoid taking at nighttime.        Risks, benefits, If an underlying cause is found, treatment for that will likely relieve the problem. Costochondritis often goes away on its own. The course of the condition varies from person to person. It usually lasts from weeks to months.  In some cases, mild symptoms c Symptoms of reflux include burning, pressure or sharp pain in the upper abdomen or mid to lower chest. The pain can spread to the neck, back, or shoulder.  There may be belching, an acid taste in the back of the throat, chronic cough, or sore throat, or demian · Acid inhibitors (proton pump inhibitors PPIs) to decrease acid production in a different way than the blockers. They may work better, but can take a little longer to take effect.   Take an antacid 30 to 60 minutes after eating and at bedtime, but not at t Reflux is more likely to strike when you’re lying down flat, because stomach fluid can flow backward more easily. Raising the head of your bed 4 to 6 inches can help. To do this:  · Slide blocks or books under the legs at the head of your bed.  Or, place a Antacids work to weaken the acid in your stomach and can give you quick relief. You can buy many of them with no prescription. Antacids can be high in sodium. This may be a problem if you have high blood pressure. Some antacids also have aluminum.  This raza Don’t take aspirin without your provider’s approval. And don’t take a nonsteroidal anti-inflammatory drug (NSAID), such as ibuprofen. These reduce the protective lining of your stomach. This can lead to more GERD symptoms.  Check with your provider or pharm

## 2019-10-06 ENCOUNTER — HOSPITAL ENCOUNTER (OUTPATIENT)
Age: 59
Discharge: ANOTHER HOSPITAL | End: 2019-10-06
Attending: EMERGENCY MEDICINE
Payer: COMMERCIAL

## 2019-10-06 ENCOUNTER — HOSPITAL ENCOUNTER (EMERGENCY)
Facility: HOSPITAL | Age: 59
Discharge: STILL ADMITTED OR ADMINISTRATIVE | End: 2019-10-07
Payer: COMMERCIAL

## 2019-10-06 VITALS
BODY MASS INDEX: 38.41 KG/M2 | SYSTOLIC BLOOD PRESSURE: 127 MMHG | TEMPERATURE: 98 F | HEIGHT: 64 IN | RESPIRATION RATE: 16 BRPM | OXYGEN SATURATION: 97 % | WEIGHT: 225 LBS | DIASTOLIC BLOOD PRESSURE: 84 MMHG | HEART RATE: 67 BPM

## 2019-10-06 DIAGNOSIS — R07.89 CHEST DISCOMFORT: Primary | ICD-10-CM

## 2019-10-06 DIAGNOSIS — R14.2 BELCHING: ICD-10-CM

## 2019-10-06 PROCEDURE — 99204 OFFICE O/P NEW MOD 45 MIN: CPT

## 2019-10-06 PROCEDURE — 93010 ELECTROCARDIOGRAM REPORT: CPT | Performed by: EMERGENCY MEDICINE

## 2019-10-06 PROCEDURE — 93005 ELECTROCARDIOGRAM TRACING: CPT

## 2019-10-06 PROCEDURE — 93010 ELECTROCARDIOGRAM REPORT: CPT

## 2019-10-06 PROCEDURE — 99214 OFFICE O/P EST MOD 30 MIN: CPT

## 2019-10-06 NOTE — ED PROVIDER NOTES
Patient Seen in: [facility] [address] [place] [address]      History   Patient presents with:   Anxiety    Stated Complaint: anxious    HPI  Patient presents requesting EKG as she was advised by an outside clinic.  2 days ago she was seen for chest anxious  Other systems are as noted in HPI. Constitutional and vital signs reviewed. All other systems reviewed and negative except as noted above.     Physical Exam     ED Triage Vitals [10/06/19 1558]   /84   Pulse 67   Resp 16   Temp 97.9 °F Clinical Impression:  Chest discomfort  (primary encounter diagnosis)  Belching    Disposition: Ic to ed  10/6/2019  3:55 pm    Follow-up:  No follow-up provider specified.       Medications Prescribed:  Current Discharge Medication List

## 2019-10-06 NOTE — ED INITIAL ASSESSMENT (HPI)
C/o feeling anxious.  Dad  a month ago  Also c/o nasal congestion and\" lots \"of burping  Denies chest pain

## 2019-10-07 PROCEDURE — 84484 ASSAY OF TROPONIN QUANT: CPT | Performed by: INTERNAL MEDICINE

## 2019-10-07 PROCEDURE — 82607 VITAMIN B-12: CPT | Performed by: INTERNAL MEDICINE

## 2019-10-07 PROCEDURE — 80050 GENERAL HEALTH PANEL: CPT | Performed by: INTERNAL MEDICINE

## 2019-10-07 PROCEDURE — 82306 VITAMIN D 25 HYDROXY: CPT | Performed by: INTERNAL MEDICINE

## 2019-10-07 NOTE — PROGRESS NOTES
Evans Shelton is a 61year old female.     Chief complaint:     Anxiety and depression, sinus congestion, diarrhea and constipation    HPI:   63-year-old female with past medical history as listed below  Is here for     flutter in her chest   Started TOPICALLY 2 (TWO) TIMES DAILY FOR 14 DAYS. Disp:  Rfl: 1   Cyclobenzaprine HCl 10 MG Oral Tab Take 1 tablet (10 mg total) by mouth nightly as needed for Muscle spasms.  Disp: 30 tablet Rfl: 0   Sertraline HCl 50 MG Oral Tab Take 1 tablet (50 mg total) by mo serious comorbidity and body mass index (BMI) of 38.0 to 38.9 in adult     Anxiety and depression     S/P partial hysterectomy      REVIEW OF SYSTEMS:   A comprehensive 10 point review of systems was completed.   Pertinent positives and negatives noted in t Gastroesophageal reflux disease, esophagitis presence not specified  Advised to take omeprazole daily on empty stomach  Referral to gastro  Please return to the clinic if you are having persistent or worsening symptoms   Catarina Levine MD,   Diplomate of t

## 2019-10-07 NOTE — PATIENT INSTRUCTIONS
Sertraline tablets  Brand Name: Zoloft  What is this medicine? SERTRALINE (SER tra yuval) is used to treat depression.  It may also be used to treat obsessive compulsive disorder, panic disorder, post-trauma stress, premenstrual dysphoric disorder (PMDD) Side effects that usually do not require medical attention (report to your doctor or health care professional if they continue or are bothersome):  · change in appetite or weight  · change in sex drive or performance  · diarrhea  · increased sweating  · in Store at room temperature between 15 and 30 degrees C (59 and 86 degrees F). Throw away any unused medicine after the expiration date. What should I tell my health care provider before I take this medicine?   They need to know if you have any of these cond You may get drowsy or dizzy. Do not drive, use machinery, or do anything that needs mental alertness until you know how this medicine affects you. Do not stand or sit up quickly, especially if you are an older patient.  This reduces the risk of dizzy or yesenia

## 2019-10-08 ENCOUNTER — TELEPHONE (OUTPATIENT)
Dept: INTERNAL MEDICINE CLINIC | Facility: CLINIC | Age: 59
End: 2019-10-08

## 2019-10-08 NOTE — PROGRESS NOTES
Confirmed  & full name, Pt was drawn today for TROPONIN, B12, VITAMIN D 25, TSH, CMP, & CBC. Pt tolerated blood draw well.  KT

## 2019-10-09 RX ORDER — ERGOCALCIFEROL 1.25 MG/1
50000 CAPSULE ORAL WEEKLY
Qty: 12 CAPSULE | Refills: 1 | Status: SHIPPED | OUTPATIENT
Start: 2019-10-09 | End: 2019-12-16

## 2019-10-25 DIAGNOSIS — J01.90 ACUTE BACTERIAL RHINOSINUSITIS: ICD-10-CM

## 2019-10-25 DIAGNOSIS — K21.9 GASTROESOPHAGEAL REFLUX DISEASE, ESOPHAGITIS PRESENCE NOT SPECIFIED: ICD-10-CM

## 2019-10-25 DIAGNOSIS — F41.9 ANXIETY AND DEPRESSION: ICD-10-CM

## 2019-10-25 DIAGNOSIS — Z00.00 ROUTINE GENERAL MEDICAL EXAMINATION AT A HEALTH CARE FACILITY: ICD-10-CM

## 2019-10-25 DIAGNOSIS — F32.A ANXIETY AND DEPRESSION: ICD-10-CM

## 2019-10-25 DIAGNOSIS — F41.1 GENERALIZED ANXIETY DISORDER: ICD-10-CM

## 2019-10-25 DIAGNOSIS — Z13.6 SCREENING FOR HEART DISEASE: ICD-10-CM

## 2019-10-25 DIAGNOSIS — B96.89 ACUTE BACTERIAL RHINOSINUSITIS: ICD-10-CM

## 2019-10-25 DIAGNOSIS — M25.50 POLYARTHRALGIA: ICD-10-CM

## 2019-10-25 RX ORDER — LORAZEPAM 1 MG/1
TABLET ORAL
Qty: 60 TABLET | Refills: 0 | Status: SHIPPED | OUTPATIENT
Start: 2019-10-25 | End: 2019-12-16

## 2019-10-29 DIAGNOSIS — F41.9 ANXIETY AND DEPRESSION: ICD-10-CM

## 2019-10-29 DIAGNOSIS — B96.89 ACUTE BACTERIAL RHINOSINUSITIS: ICD-10-CM

## 2019-10-29 DIAGNOSIS — R07.89 ATYPICAL CHEST PAIN: ICD-10-CM

## 2019-10-29 DIAGNOSIS — F32.A ANXIETY AND DEPRESSION: ICD-10-CM

## 2019-10-29 DIAGNOSIS — J01.90 ACUTE BACTERIAL RHINOSINUSITIS: ICD-10-CM

## 2019-10-29 DIAGNOSIS — R19.7 DIARRHEA, UNSPECIFIED TYPE: ICD-10-CM

## 2019-10-29 DIAGNOSIS — K21.9 GASTROESOPHAGEAL REFLUX DISEASE, ESOPHAGITIS PRESENCE NOT SPECIFIED: ICD-10-CM

## 2019-10-29 RX ORDER — FLUTICASONE PROPIONATE 50 MCG
SPRAY, SUSPENSION (ML) NASAL
Qty: 1 INHALER | Refills: 1 | Status: SHIPPED | OUTPATIENT
Start: 2019-10-29 | End: 2021-12-16

## 2019-11-28 DIAGNOSIS — R10.9 ABDOMINAL PAIN, UNSPECIFIED ABDOMINAL LOCATION: ICD-10-CM

## 2019-11-28 DIAGNOSIS — R14.0 BLOATING: Primary | ICD-10-CM

## 2019-11-28 RX ORDER — DICYCLOMINE HYDROCHLORIDE 10 MG/1
10 CAPSULE ORAL 3 TIMES DAILY PRN
Qty: 30 CAPSULE | Refills: 1 | Status: SHIPPED | OUTPATIENT
Start: 2019-11-28 | End: 2019-12-08

## 2019-12-03 RX ORDER — CITALOPRAM 20 MG/1
20 TABLET ORAL DAILY
Qty: 30 TABLET | Refills: 2 | Status: SHIPPED | OUTPATIENT
Start: 2019-12-03 | End: 2020-03-09

## 2019-12-04 ENCOUNTER — TELEPHONE (OUTPATIENT)
Dept: INTERNAL MEDICINE CLINIC | Facility: CLINIC | Age: 59
End: 2019-12-04

## 2019-12-04 ENCOUNTER — NURSE ONLY (OUTPATIENT)
Dept: INTERNAL MEDICINE CLINIC | Facility: CLINIC | Age: 59
End: 2019-12-04
Payer: COMMERCIAL

## 2019-12-04 DIAGNOSIS — Z90.711 S/P PARTIAL HYSTERECTOMY: ICD-10-CM

## 2019-12-04 DIAGNOSIS — K21.9 GASTROESOPHAGEAL REFLUX DISEASE, ESOPHAGITIS PRESENCE NOT SPECIFIED: ICD-10-CM

## 2019-12-04 DIAGNOSIS — R10.9 ABDOMINAL CRAMPING: ICD-10-CM

## 2019-12-04 DIAGNOSIS — N63.10 LUMP OF RIGHT BREAST: ICD-10-CM

## 2019-12-04 DIAGNOSIS — K52.9 CHRONIC DIARRHEA OF UNKNOWN ORIGIN: ICD-10-CM

## 2019-12-04 DIAGNOSIS — Z12.11 SCREENING FOR COLON CANCER: ICD-10-CM

## 2019-12-04 DIAGNOSIS — Z00.00 ANNUAL PHYSICAL EXAM: ICD-10-CM

## 2019-12-04 DIAGNOSIS — B35.9 TINEA: ICD-10-CM

## 2019-12-04 DIAGNOSIS — R10.84 GENERALIZED ABDOMINAL PAIN: Primary | ICD-10-CM

## 2019-12-04 DIAGNOSIS — N89.8 VAGINAL DISCHARGE: ICD-10-CM

## 2019-12-04 PROCEDURE — 82274 ASSAY TEST FOR BLOOD FECAL: CPT | Performed by: INTERNAL MEDICINE

## 2019-12-04 NOTE — TELEPHONE ENCOUNTER
Pt called the office today to report that she had some concerns regarding the one of the medications she is taking.      Pt states that since starting Dicyclomine HCL 10MG, she has started to feel better but today she thinks she took the medication too soon

## 2019-12-10 PROCEDURE — 80061 LIPID PANEL: CPT | Performed by: INTERNAL MEDICINE

## 2019-12-10 PROCEDURE — 81003 URINALYSIS AUTO W/O SCOPE: CPT | Performed by: INTERNAL MEDICINE

## 2019-12-10 PROCEDURE — 82784 ASSAY IGA/IGD/IGG/IGM EACH: CPT | Performed by: INTERNAL MEDICINE

## 2019-12-10 PROCEDURE — 83516 IMMUNOASSAY NONANTIBODY: CPT | Performed by: INTERNAL MEDICINE

## 2019-12-10 PROCEDURE — 83690 ASSAY OF LIPASE: CPT | Performed by: INTERNAL MEDICINE

## 2019-12-10 PROCEDURE — 80053 COMPREHEN METABOLIC PANEL: CPT | Performed by: INTERNAL MEDICINE

## 2019-12-10 NOTE — PROGRESS NOTES
Ghulam Harper is a 61year old female.     Chief complaint:  Follow up on chronic conditions , abdominal bloating diarrhea       HPI:   Is a 5year-old female with past medical history as listed below is here for follow-up on chronic condition  Aminah not taking: Reported on 12/10/2019) 60 tablet 0   • Sertraline HCl 50 MG Oral Tab Take 1 tablet (50 mg total) by mouth daily.  (Patient not taking: Reported on 12/10/2019 ) 90 tablet 0   • Meloxicam 7.5 MG Oral Tab TAKE 2 TAB DAILY X 4 DAYS, THEN 1 TAB PONCE 46        mi   • Heart Disease Maternal Grandfather 46        CAD   • Diabetes Maternal Grandfather    • Diabetes Maternal Grandmother    • Heart Disease Maternal Grandmother         CAD     Patient Active Problem List:     Abnormal results of thyroid func CALPROTECTIN, FECAL; Future  - CDIFFICILE TOXIGENIC PCR (OPT); Future  - GASTRO - INTERNAL  - CELIAC DISEASE SCREEN REFLEX; Future  - COMP METABOLIC PANEL (14); Future  - LIPASE; Future  - LIPID PANEL;  Future  - CELIAC DISEASE SCREEN REFLEX  - COMP METABOL

## 2019-12-10 NOTE — PATIENT INSTRUCTIONS
Trazodone tablets  Brand Name: Pauline Dozier  What is this medicine? TRAZODONE (Josepha Liseth oh done) is used to treat depression. How should I use this medicine? Take this medicine by mouth with a glass of water. Follow the directions on the prescription label.  Ta · certain medicines for fungal infections like fluconazole, itraconazole, ketoconazole, posaconazole, voriconazole  · cisapride  · dofetilide  · dronedarone  · linezolid  · MAOIs like Carbex, Eldepryl, Marplan, Nardil, and Parnate  · mesoridazine  · methyl · heart disease, or previous heart attack  · irregular heart beat  · kidney or liver disease  · low levels of sodium in the blood  · an unusual or allergic reaction to trazodone, other medicines, foods, dyes or preservatives  · pregnant or trying to get pr NOTE:This sheet is a summary. It may not cover all possible information. If you have questions about this medicine, talk to your doctor, pharmacist, or health care provider.  Copyright© 2019 [employer]

## 2019-12-11 ENCOUNTER — NURSE ONLY (OUTPATIENT)
Dept: INTERNAL MEDICINE CLINIC | Facility: CLINIC | Age: 59
End: 2019-12-11
Payer: COMMERCIAL

## 2019-12-11 ENCOUNTER — HOSPITAL ENCOUNTER (OUTPATIENT)
Dept: ULTRASOUND IMAGING | Facility: HOSPITAL | Age: 59
Discharge: HOME | End: 2019-12-11
Attending: INTERNAL MEDICINE
Payer: COMMERCIAL

## 2019-12-11 ENCOUNTER — HOSPITAL ENCOUNTER (OUTPATIENT)
Dept: MAMMOGRAPHY | Facility: HOSPITAL | Age: 59
Discharge: HOME | End: 2019-12-11
Attending: INTERNAL MEDICINE
Payer: COMMERCIAL

## 2019-12-11 DIAGNOSIS — N63.10 LUMP OF RIGHT BREAST: ICD-10-CM

## 2019-12-11 DIAGNOSIS — R19.7 DIARRHEA, UNSPECIFIED TYPE: ICD-10-CM

## 2019-12-11 DIAGNOSIS — K21.9 GASTROESOPHAGEAL REFLUX DISEASE, ESOPHAGITIS PRESENCE NOT SPECIFIED: ICD-10-CM

## 2019-12-11 DIAGNOSIS — Z00.00 ANNUAL PHYSICAL EXAM: ICD-10-CM

## 2019-12-11 DIAGNOSIS — R39.9 LOWER URINARY TRACT SYMPTOMS: ICD-10-CM

## 2019-12-11 DIAGNOSIS — R14.0 ABDOMINAL BLOATING: ICD-10-CM

## 2019-12-11 DIAGNOSIS — Z90.711 S/P PARTIAL HYSTERECTOMY: ICD-10-CM

## 2019-12-11 DIAGNOSIS — F41.9 ANXIETY AND DEPRESSION: ICD-10-CM

## 2019-12-11 DIAGNOSIS — R10.9 ABDOMINAL PAIN, UNSPECIFIED ABDOMINAL LOCATION: ICD-10-CM

## 2019-12-11 DIAGNOSIS — Z12.11 SCREENING FOR COLON CANCER: ICD-10-CM

## 2019-12-11 DIAGNOSIS — R14.0 BLOATING: ICD-10-CM

## 2019-12-11 DIAGNOSIS — F32.A ANXIETY AND DEPRESSION: ICD-10-CM

## 2019-12-11 DIAGNOSIS — B35.9 TINEA: ICD-10-CM

## 2019-12-11 DIAGNOSIS — N89.8 VAGINAL DISCHARGE: ICD-10-CM

## 2019-12-11 PROCEDURE — 77062 BREAST TOMOSYNTHESIS BI: CPT | Performed by: INTERNAL MEDICINE

## 2019-12-11 PROCEDURE — 87493 C DIFF AMPLIFIED PROBE: CPT | Performed by: INTERNAL MEDICINE

## 2019-12-11 PROCEDURE — 87338 HPYLORI STOOL AG IA: CPT | Performed by: INTERNAL MEDICINE

## 2019-12-11 PROCEDURE — 76642 ULTRASOUND BREAST LIMITED: CPT | Performed by: INTERNAL MEDICINE

## 2019-12-11 PROCEDURE — 77066 DX MAMMO INCL CAD BI: CPT | Performed by: INTERNAL MEDICINE

## 2019-12-16 ENCOUNTER — TELEPHONE (OUTPATIENT)
Dept: INTERNAL MEDICINE CLINIC | Facility: CLINIC | Age: 59
End: 2019-12-16

## 2019-12-16 DIAGNOSIS — Z12.11 SCREENING FOR COLON CANCER: ICD-10-CM

## 2019-12-16 DIAGNOSIS — B96.89 ACUTE BACTERIAL RHINOSINUSITIS: ICD-10-CM

## 2019-12-16 DIAGNOSIS — B35.9 TINEA: ICD-10-CM

## 2019-12-16 DIAGNOSIS — Z00.00 ROUTINE GENERAL MEDICAL EXAMINATION AT A HEALTH CARE FACILITY: ICD-10-CM

## 2019-12-16 DIAGNOSIS — Z90.711 S/P PARTIAL HYSTERECTOMY: ICD-10-CM

## 2019-12-16 DIAGNOSIS — F32.A ANXIETY AND DEPRESSION: ICD-10-CM

## 2019-12-16 DIAGNOSIS — R19.7 DIARRHEA, UNSPECIFIED TYPE: ICD-10-CM

## 2019-12-16 DIAGNOSIS — Z00.00 ANNUAL PHYSICAL EXAM: ICD-10-CM

## 2019-12-16 DIAGNOSIS — N89.8 VAGINAL DISCHARGE: ICD-10-CM

## 2019-12-16 DIAGNOSIS — Z13.6 SCREENING FOR HEART DISEASE: ICD-10-CM

## 2019-12-16 DIAGNOSIS — F41.9 ANXIETY AND DEPRESSION: ICD-10-CM

## 2019-12-16 DIAGNOSIS — F41.1 GENERALIZED ANXIETY DISORDER: ICD-10-CM

## 2019-12-16 DIAGNOSIS — R92.8 ABNORMAL MAMMOGRAM: Primary | ICD-10-CM

## 2019-12-16 DIAGNOSIS — R14.0 ABDOMINAL BLOATING: Primary | ICD-10-CM

## 2019-12-16 DIAGNOSIS — N63.10 LUMP OF RIGHT BREAST: ICD-10-CM

## 2019-12-16 DIAGNOSIS — K21.9 GASTROESOPHAGEAL REFLUX DISEASE, ESOPHAGITIS PRESENCE NOT SPECIFIED: ICD-10-CM

## 2019-12-16 DIAGNOSIS — J01.90 ACUTE BACTERIAL RHINOSINUSITIS: ICD-10-CM

## 2019-12-16 DIAGNOSIS — M25.50 POLYARTHRALGIA: ICD-10-CM

## 2019-12-17 RX ORDER — LORAZEPAM 1 MG/1
1 TABLET ORAL 2 TIMES DAILY
Qty: 60 TABLET | Refills: 0 | Status: SHIPPED | OUTPATIENT
Start: 2019-12-17 | End: 2020-02-28

## 2019-12-17 RX ORDER — ERGOCALCIFEROL 1.25 MG/1
50000 CAPSULE ORAL WEEKLY
Qty: 12 CAPSULE | Refills: 1 | Status: SHIPPED | OUTPATIENT
Start: 2019-12-17 | End: 2020-06-16

## 2019-12-17 RX ORDER — OMEPRAZOLE 40 MG/1
40 CAPSULE, DELAYED RELEASE ORAL
Qty: 90 CAPSULE | Refills: 1 | Status: SHIPPED | OUTPATIENT
Start: 2019-12-17 | End: 2020-07-17

## 2019-12-23 ENCOUNTER — HOSPITAL ENCOUNTER (OUTPATIENT)
Dept: CT IMAGING | Facility: HOSPITAL | Age: 59
Discharge: HOME | End: 2019-12-23
Attending: INTERNAL MEDICINE
Payer: COMMERCIAL

## 2019-12-23 DIAGNOSIS — R10.9 ABDOMINAL CRAMPING: ICD-10-CM

## 2019-12-23 DIAGNOSIS — K52.9 CHRONIC DIARRHEA OF UNKNOWN ORIGIN: ICD-10-CM

## 2019-12-23 DIAGNOSIS — R10.84 GENERALIZED ABDOMINAL PAIN: ICD-10-CM

## 2019-12-23 PROCEDURE — 74177 CT ABD & PELVIS W/CONTRAST: CPT | Performed by: INTERNAL MEDICINE

## 2019-12-30 DIAGNOSIS — R07.89 ATYPICAL CHEST PAIN: ICD-10-CM

## 2019-12-30 DIAGNOSIS — K21.9 GASTROESOPHAGEAL REFLUX DISEASE, ESOPHAGITIS PRESENCE NOT SPECIFIED: ICD-10-CM

## 2019-12-30 DIAGNOSIS — R19.7 DIARRHEA, UNSPECIFIED TYPE: ICD-10-CM

## 2019-12-30 DIAGNOSIS — B96.89 ACUTE BACTERIAL RHINOSINUSITIS: ICD-10-CM

## 2019-12-30 DIAGNOSIS — F32.A ANXIETY AND DEPRESSION: ICD-10-CM

## 2019-12-30 DIAGNOSIS — J01.90 ACUTE BACTERIAL RHINOSINUSITIS: ICD-10-CM

## 2019-12-30 DIAGNOSIS — F41.9 ANXIETY AND DEPRESSION: ICD-10-CM

## 2020-01-04 ENCOUNTER — OFFICE VISIT (OUTPATIENT)
Dept: FAMILY MEDICINE CLINIC | Facility: CLINIC | Age: 60
End: 2020-01-04
Payer: COMMERCIAL

## 2020-01-04 VITALS
HEART RATE: 78 BPM | HEIGHT: 64 IN | WEIGHT: 242.5 LBS | SYSTOLIC BLOOD PRESSURE: 120 MMHG | BODY MASS INDEX: 41.4 KG/M2 | OXYGEN SATURATION: 99 % | DIASTOLIC BLOOD PRESSURE: 80 MMHG | TEMPERATURE: 98 F | RESPIRATION RATE: 20 BRPM

## 2020-01-04 DIAGNOSIS — J06.9 VIRAL UPPER RESPIRATORY TRACT INFECTION: Primary | ICD-10-CM

## 2020-01-04 PROCEDURE — 99212 OFFICE O/P EST SF 10 MIN: CPT | Performed by: NURSE PRACTITIONER

## 2020-01-04 NOTE — PROGRESS NOTES
CHIEF COMPLAINT:   Patient presents with:  Cold: X2 days, chills. fatigue,sneezing, runny nose, coughing through the night,      HPI:   Sheryl Lee is a 61year old female who presents for upper respiratory symptoms for  2 days.  Patient reports cou • hydrocortisone 2.5 % External Cream Apply 1 Application topically 2 (two) times daily.  Prn itching to affected areas 1 Tube 0   • Albuterol Sulfate HFA (PROAIR HFA) 108 (90 Base) MCG/ACT Inhalation Aero Soln Inhale 2 puffs into the lungs every 4 (four) h LUNGS: clear to auscultation bilaterally. Breathing is non labored. No cough noted. CARDIO: RRR without murmur  LYMPH:  No cervical lymphadenopathy.         ASSESSMENT AND PLAN:   Shemar Dinh is a 61year old female who presents with     ASSESSMEN · You may use acetaminophen or ibuprofen to control pain and fever, unless another medicine was prescribed. If you have chronic liver or kidney disease, have ever had a stomach ulcer or gastrointestinal bleeding, or are taking blood-thinning medicines, darrel © 3206-7741 [employer] [address] [facility], [address] [place]. All rights reserved. This information is not intended as a substitute for professional medical care. Always follow your healthcare professional's instructions.             The

## 2020-01-05 NOTE — PROGRESS NOTES
CC:  Suture Removal (Pt presents to clinic for suture removal)      Hx of CC:  SKIN EXCISION AT [facility] OFFICE 8 DAYS AGO.   HERE FOR SUTURE REMOVAL    Vitals:    11/16/17  0911   BP: 110/80   Weight: 225 lb   Height: 65\"         Body mass index is 37.44 kg/m No

## 2020-01-14 ENCOUNTER — TELEPHONE (OUTPATIENT)
Dept: GASTROENTEROLOGY | Facility: CLINIC | Age: 60
End: 2020-01-14

## 2020-01-14 ENCOUNTER — OFFICE VISIT (OUTPATIENT)
Dept: GASTROENTEROLOGY | Facility: CLINIC | Age: 60
End: 2020-01-14
Payer: COMMERCIAL

## 2020-01-14 ENCOUNTER — PATIENT MESSAGE (OUTPATIENT)
Dept: GASTROENTEROLOGY | Facility: CLINIC | Age: 60
End: 2020-01-14

## 2020-01-14 VITALS
WEIGHT: 238 LBS | DIASTOLIC BLOOD PRESSURE: 82 MMHG | BODY MASS INDEX: 40.63 KG/M2 | HEIGHT: 64 IN | HEART RATE: 81 BPM | SYSTOLIC BLOOD PRESSURE: 115 MMHG

## 2020-01-14 DIAGNOSIS — R19.7 DIARRHEA, UNSPECIFIED TYPE: Primary | ICD-10-CM

## 2020-01-14 DIAGNOSIS — Z12.11 ENCOUNTER FOR SCREENING COLONOSCOPY: ICD-10-CM

## 2020-01-14 DIAGNOSIS — K21.9 GASTROESOPHAGEAL REFLUX DISEASE, ESOPHAGITIS PRESENCE NOT SPECIFIED: ICD-10-CM

## 2020-01-14 DIAGNOSIS — R14.0 BLOATING: ICD-10-CM

## 2020-01-14 PROCEDURE — S0285 CNSLT BEFORE SCREEN COLONOSC: HCPCS | Performed by: INTERNAL MEDICINE

## 2020-01-14 RX ORDER — FAMOTIDINE 20 MG/1
20 TABLET ORAL NIGHTLY PRN
Qty: 90 TABLET | Refills: 3 | Status: SHIPPED | OUTPATIENT
Start: 2020-01-14 | End: 2021-01-27

## 2020-01-14 RX ORDER — DICYCLOMINE HYDROCHLORIDE 10 MG/1
CAPSULE ORAL
COMMUNITY
Start: 2020-01-07 | End: 2020-02-27

## 2020-01-14 NOTE — H&P
[facility], [facility] - Gastroenterology  Clinic History and Physical     Patient presents with:  Consult: some abdominal pain andexcessive gas and (burping).  possible ibs  Weight Loss Gain      HPI:   Amalia Gloria is a 61year old year-old female with hi (102.1 kg)  10/04/19 : 230 lb (104.3 kg)  04/15/19 : 224 lb (101.6 kg)  01/09/19 : 220 lb 9.6 oz (100.1 kg)  12/04/18 : 216 lb (98 kg)  11/16/17 : 225 lb (102.1 kg)    Was on NSAIDs. Was taking aleve and advil PM. Does have abdominal pain but it helps.  Wor mg total) by mouth once daily. 90 capsule 1   • ergocalciferol 1.25 MG (56748 UT) Oral Cap Take 1 capsule (50,000 Units total) by mouth once a week for 12 doses.  12 capsule 1   • LORazepam 1 MG Oral Tab Take 1 tablet (1 mg total) by mouth 2 (two) times kayla for hay fever  ENDOCRINE:  negative for cold intolerance and heat intolerance  MUSCULOSKELETAL:  negative for joint effusion/severe erythema  BEHAVIOR/PSYCH:  negative for psychotic behavior      PHYSICAL EXAM:   BP (!) 158/108 (BP Location: Right arm, Anisha Angles 12/11/2019 12/10/2019 12/10/2019            7:52 AM  7:52 AM   Glucose      70 - 99 mg/dL   85   Sodium      136 - 145 mmol/L   142   Potassium      3.5 - 5.1 mmol/L   3.8   Chloride      98 - 112 mmol/L   108   Carbon Dioxide, Total      21.0 - 32.0 mmol/ done based on the patient's size. Iterative reconstruction technique for dose reduction was employed. Oral contrast was ingested. FINDINGS:  LUNG BASES:  The heart is normal in size. There is no visible pulmonary or pleural disease.    LIVER:  No enla a 61year old year-old female with history of  depression, anxiety, HTN, reflux, obesity, hx of hysterectomy who presents for diarrhea. 1. Diarrhea, unspecified type    2. Gastroesophageal reflux disease, esophagitis presence not specified    3.  Encount

## 2020-01-14 NOTE — PATIENT INSTRUCTIONS
Famotidine or tums for breakthrough reflux  Try low FODMAP diet  ===================================================    1. Schedule EGD/colonoscopy with MAC [Diagnosis: diarrhea, reflux, abdominal pain and bloating]    2.   bowel prep from pharmacy (

## 2020-01-14 NOTE — TELEPHONE ENCOUNTER
Dr Ana Laura Childs  Can you please send bowel prep to pt's pharmacy. You may close encounter when done, thank you.

## 2020-01-14 NOTE — TELEPHONE ENCOUNTER
Pt asking if you can send the prescription because her insurance will cover the medication even if it's OTC

## 2020-01-14 NOTE — TELEPHONE ENCOUNTER
Dr Richardson Goodpasture    Were you going to send a prescription for famotidine for this pt? Or, did you want her to buy it OTC?

## 2020-01-14 NOTE — TELEPHONE ENCOUNTER
Scheduled for: Colonoscopy [address] [facility]  Provider Name: Dr Joanna Carlisle  Date: Yeimy Juan Albertoting 3/10/20  Location: [facility]  Sedation: MAC   Time: 1:00 pm, pt is aware that [facility] will call day before procedure to verify arrival time  Prep: split dose suprep or trilyte  Meds/All

## 2020-01-16 NOTE — TELEPHONE ENCOUNTER
From: Purvi Joseph  To: Jaspal Leonardo MD  Sent: 1/14/2020 3:55 PM CST  Subject: Prescription Question    Hi I didn’t get the prescription for the nighttime acid reflux pill   Thanks  Erin Velasquez

## 2020-01-22 ENCOUNTER — OFFICE VISIT (OUTPATIENT)
Dept: FAMILY MEDICINE CLINIC | Facility: CLINIC | Age: 60
End: 2020-01-22
Payer: COMMERCIAL

## 2020-01-22 VITALS
DIASTOLIC BLOOD PRESSURE: 76 MMHG | RESPIRATION RATE: 14 BRPM | SYSTOLIC BLOOD PRESSURE: 137 MMHG | TEMPERATURE: 99 F | HEART RATE: 84 BPM

## 2020-01-22 DIAGNOSIS — B96.89 ACUTE BACTERIAL RHINOSINUSITIS: Primary | ICD-10-CM

## 2020-01-22 DIAGNOSIS — J01.90 ACUTE BACTERIAL RHINOSINUSITIS: Primary | ICD-10-CM

## 2020-01-22 PROCEDURE — 99213 OFFICE O/P EST LOW 20 MIN: CPT | Performed by: NURSE PRACTITIONER

## 2020-01-22 RX ORDER — AMOXICILLIN 875 MG/1
875 TABLET, COATED ORAL 2 TIMES DAILY
Qty: 20 TABLET | Refills: 0 | Status: SHIPPED | OUTPATIENT
Start: 2020-01-22 | End: 2020-01-31 | Stop reason: ALTCHOICE

## 2020-01-22 NOTE — PROGRESS NOTES
CHIEF COMPLAINT:   Patient presents with:  URI      HPI:   Gildardo Emerson is a 61year old female who presents for upper respiratory symptoms. Patient evaluated approximately 3 weeks ago for URI sx, she admits they never completely resolved.  She is here • Cyclobenzaprine HCl 10 MG Oral Tab Take 1 tablet (10 mg total) by mouth nightly as needed for Muscle spasms.  (Patient not taking: Reported on 1/22/2020 ) 30 tablet 0   • hydrocortisone 2.5 % External Cream Apply 1 Application topically 2 (two) times da auricular lymphadenopathy.       ASSESSMENT AND PLAN:   Orlando Goldberg is a 61year old female who presents with upper respiratory symptoms that are consistent with    ASSESSMENT:   Acute bacterial rhinosinusitis  (primary encounter diagnosis)    PLAN: M

## 2020-01-27 ENCOUNTER — PATIENT MESSAGE (OUTPATIENT)
Dept: INTERNAL MEDICINE CLINIC | Facility: CLINIC | Age: 60
End: 2020-01-27

## 2020-01-29 RX ORDER — PROMETHAZINE HYDROCHLORIDE AND CODEINE PHOSPHATE 6.25; 1 MG/5ML; MG/5ML
2.5 SYRUP ORAL EVERY 6 HOURS PRN
Qty: 118 ML | Refills: 0 | Status: SHIPPED | OUTPATIENT
Start: 2020-01-29 | End: 2020-03-10

## 2020-01-30 NOTE — TELEPHONE ENCOUNTER
From: Madisyn Vilchis  To:  Roberta Atkins MD  Sent: 1/27/2020 6:00 PM CST  Subject: Prescription Question    PROMETHAZINE-CODEINE Can I get a refill I was given it one time by dr Rogers Click I went to urgent care and I’m on amoxicillin and I have the cough

## 2020-01-31 ENCOUNTER — OFFICE VISIT (OUTPATIENT)
Dept: FAMILY MEDICINE CLINIC | Facility: CLINIC | Age: 60
End: 2020-01-31
Payer: COMMERCIAL

## 2020-01-31 VITALS
TEMPERATURE: 98 F | OXYGEN SATURATION: 97 % | RESPIRATION RATE: 16 BRPM | DIASTOLIC BLOOD PRESSURE: 80 MMHG | HEART RATE: 83 BPM | SYSTOLIC BLOOD PRESSURE: 130 MMHG | WEIGHT: 243 LBS | HEIGHT: 64 IN | BODY MASS INDEX: 41.48 KG/M2

## 2020-01-31 DIAGNOSIS — J34.89 SINUS PAIN: Primary | ICD-10-CM

## 2020-01-31 PROCEDURE — 99213 OFFICE O/P EST LOW 20 MIN: CPT | Performed by: NURSE PRACTITIONER

## 2020-01-31 RX ORDER — METHYLPREDNISOLONE 4 MG/1
TABLET ORAL
Qty: 1 KIT | Refills: 0 | Status: SHIPPED | OUTPATIENT
Start: 2020-01-31 | End: 2021-12-16

## 2020-01-31 NOTE — PROGRESS NOTES
CHIEF COMPLAINT:   Patient presents with:  Sinus Problem: was treated 10 days ago for sinus infection, has 1 days worth of abx left, states she is not better.        HPI:   Roro Bey is a 61year old female who presents for sinus symptoms for  3 wee • FLUTICASONE PROPIONATE 50 MCG/ACT Nasal Suspension SPRAY 2 SPRAYS INTO EACH NOSTRIL EVERY DAY (Patient not taking: Reported on 1/31/2020) 1 Inhaler 1   • Meloxicam 7.5 MG Oral Tab TAKE 2 TAB DAILY X 4 DAYS, THEN 1 TAB DAILY UNTIL FINISHED  1   • fluconaz Years since quittin.8      Smokeless tobacco: Never Used    Alcohol use: No      Alcohol/week: 0.0 standard drinks      Comment: Rarely    Drug use: No        REVIEW OF SYSTEMS:   GENERAL: feels well otherwise, no unplanned weight change and nor ASSESSMENT:  Roro Bey is a 61year old female who presents with continued symptoms of sinus infection post abx treatment.  No fever since being on abx, will treat with steroid burst and flonase, Was taking abx and probiotic at the same time in the The sinuses are air-filled spaces within the bones of the face. They connect to the inside of the nose. Sinusitis is an inflammation of the tissue that lines the sinuses.  Sinusitis can occur during a cold. It can also happen due to allergies to pollens and · Use acetaminophen or ibuprofen to control pain, unless another pain medicine was prescribed to you. If you have chronic liver or kidney disease or ever had a stomach ulcer, talk with your healthcare provider before using these medicines.  (Aspirin should

## 2020-02-12 ENCOUNTER — OFFICE VISIT (OUTPATIENT)
Dept: INTERNAL MEDICINE CLINIC | Facility: CLINIC | Age: 60
End: 2020-02-12
Payer: COMMERCIAL

## 2020-02-12 VITALS
HEIGHT: 64 IN | SYSTOLIC BLOOD PRESSURE: 120 MMHG | OXYGEN SATURATION: 98 % | HEART RATE: 74 BPM | BODY MASS INDEX: 41.15 KG/M2 | WEIGHT: 241 LBS | DIASTOLIC BLOOD PRESSURE: 82 MMHG | TEMPERATURE: 99 F

## 2020-02-12 DIAGNOSIS — J32.9 SINUSITIS, UNSPECIFIED CHRONICITY, UNSPECIFIED LOCATION: Primary | ICD-10-CM

## 2020-02-12 DIAGNOSIS — H10.32 ACUTE BACTERIAL CONJUNCTIVITIS OF LEFT EYE: ICD-10-CM

## 2020-02-12 PROCEDURE — 99213 OFFICE O/P EST LOW 20 MIN: CPT | Performed by: INTERNAL MEDICINE

## 2020-02-12 RX ORDER — POLYMYXIN B SULFATE AND TRIMETHOPRIM 1; 10000 MG/ML; [USP'U]/ML
1 SOLUTION OPHTHALMIC EVERY 4 HOURS
Qty: 10 ML | Refills: 0 | Status: SHIPPED | OUTPATIENT
Start: 2020-02-12 | End: 2020-02-22

## 2020-02-12 RX ORDER — CETIRIZINE HYDROCHLORIDE, PSEUDOEPHEDRINE HYDROCHLORIDE 5; 120 MG/1; MG/1
1 TABLET, FILM COATED, EXTENDED RELEASE ORAL 2 TIMES DAILY
Qty: 14 TABLET | Refills: 0 | Status: SHIPPED | OUTPATIENT
Start: 2020-02-12 | End: 2020-03-26

## 2020-02-12 RX ORDER — DOXYCYCLINE HYCLATE 100 MG
100 TABLET ORAL 2 TIMES DAILY
Qty: 20 TABLET | Refills: 0 | Status: SHIPPED | OUTPATIENT
Start: 2020-02-12 | End: 2020-02-22

## 2020-02-12 NOTE — PROGRESS NOTES
Ileene Lanes is a 61year old female.     Chief complaint: URTI , sinus congestion       HPI:   61year old female with PMH as listed below here for   URTI and sinus congestion   Symptoms   Started 3 weeks ago   Headache   Sinus congestion   No fever o DAILY X 4 DAYS, THEN 1 TAB DAILY UNTIL FINISHED  1   • fluconazole 150 MG Oral Tab Take 1 tablet (150 mg total) by mouth every 3 (three) days.  (Patient not taking: Reported on 1/22/2020 ) 3 tablet 0   • Zolpidem Tartrate 10 MG Oral Tab Take 1 tablet (10 mg Problem List:     Abnormal results of thyroid function studies     Routine general medical examination at a health care facility     Elevated blood pressure reading     Class 2 obesity with serious comorbidity and body mass index (BMI) of 38.0 to 38.9 in a

## 2020-02-17 ENCOUNTER — TELEPHONE (OUTPATIENT)
Dept: INTERNAL MEDICINE CLINIC | Facility: CLINIC | Age: 60
End: 2020-02-17

## 2020-02-17 ENCOUNTER — TELEPHONE (OUTPATIENT)
Dept: GASTROENTEROLOGY | Facility: CLINIC | Age: 60
End: 2020-02-17

## 2020-02-17 DIAGNOSIS — R39.9 UTI SYMPTOMS: Primary | ICD-10-CM

## 2020-02-17 NOTE — TELEPHONE ENCOUNTER
Pt requesting call back from nurse re: \"yellow/orange\"urine she noticed today. States she has been on abx since last Wednesday and had noticed cloudy urine a couple days ago. Also had been staying hydrated drinking plenty of water.

## 2020-02-17 NOTE — TELEPHONE ENCOUNTER
Rescheduled for:  Colonoscopy 76281 and EGD 75324  Provider Name: Dr. Marleni Youngblood  Date:    From-3/10/20  To-3/24/20  Location:  [facility]  Sedation:  MAC  Time:    From-1300  To-1500 (pt is aware that [facility] will call the day before to confirm arrival time)    Prep:   Castillo

## 2020-02-18 ENCOUNTER — NURSE ONLY (OUTPATIENT)
Dept: INTERNAL MEDICINE CLINIC | Facility: CLINIC | Age: 60
End: 2020-02-18
Payer: COMMERCIAL

## 2020-02-18 DIAGNOSIS — R39.9 UTI SYMPTOMS: ICD-10-CM

## 2020-02-18 LAB
BILIRUB UR QL: NEGATIVE
CLARITY UR: CLEAR
COLOR UR: YELLOW
GLUCOSE UR-MCNC: NEGATIVE MG/DL
HGB UR QL STRIP.AUTO: NEGATIVE
KETONES UR-MCNC: NEGATIVE MG/DL
LEUKOCYTE ESTERASE UR QL STRIP.AUTO: NEGATIVE
NITRITE UR QL STRIP.AUTO: NEGATIVE
PH UR: 7 [PH] (ref 5–8)
PROT UR-MCNC: NEGATIVE MG/DL
SP GR UR STRIP: 1.01 (ref 1–1.03)
UROBILINOGEN UR STRIP-ACNC: <2

## 2020-02-18 PROCEDURE — 81003 URINALYSIS AUTO W/O SCOPE: CPT | Performed by: INTERNAL MEDICINE

## 2020-02-26 DIAGNOSIS — K21.9 GASTROESOPHAGEAL REFLUX DISEASE, ESOPHAGITIS PRESENCE NOT SPECIFIED: ICD-10-CM

## 2020-02-26 DIAGNOSIS — B96.89 ACUTE BACTERIAL RHINOSINUSITIS: ICD-10-CM

## 2020-02-26 DIAGNOSIS — F41.1 GENERALIZED ANXIETY DISORDER: ICD-10-CM

## 2020-02-26 DIAGNOSIS — F32.A ANXIETY AND DEPRESSION: ICD-10-CM

## 2020-02-26 DIAGNOSIS — F41.9 ANXIETY AND DEPRESSION: ICD-10-CM

## 2020-02-26 DIAGNOSIS — Z13.6 SCREENING FOR HEART DISEASE: ICD-10-CM

## 2020-02-26 DIAGNOSIS — J01.90 ACUTE BACTERIAL RHINOSINUSITIS: ICD-10-CM

## 2020-02-26 DIAGNOSIS — M25.50 POLYARTHRALGIA: ICD-10-CM

## 2020-02-26 DIAGNOSIS — Z00.00 ROUTINE GENERAL MEDICAL EXAMINATION AT A HEALTH CARE FACILITY: ICD-10-CM

## 2020-02-27 RX ORDER — DICYCLOMINE HYDROCHLORIDE 10 MG/1
CAPSULE ORAL
Qty: 30 CAPSULE | Refills: 1 | Status: SHIPPED | OUTPATIENT
Start: 2020-02-27 | End: 2021-12-16

## 2020-02-28 RX ORDER — LORAZEPAM 1 MG/1
TABLET ORAL
Qty: 60 TABLET | Refills: 0 | Status: SHIPPED | OUTPATIENT
Start: 2020-02-28 | End: 2020-05-10

## 2020-03-09 DIAGNOSIS — F32.A ANXIETY AND DEPRESSION: ICD-10-CM

## 2020-03-09 DIAGNOSIS — R19.7 DIARRHEA, UNSPECIFIED TYPE: ICD-10-CM

## 2020-03-09 DIAGNOSIS — F41.9 ANXIETY AND DEPRESSION: ICD-10-CM

## 2020-03-09 DIAGNOSIS — R14.0 ABDOMINAL BLOATING: ICD-10-CM

## 2020-03-09 DIAGNOSIS — R39.9 LOWER URINARY TRACT SYMPTOMS: ICD-10-CM

## 2020-03-09 RX ORDER — TRAZODONE HYDROCHLORIDE 50 MG/1
TABLET ORAL
Qty: 90 TABLET | Refills: 0 | Status: SHIPPED | OUTPATIENT
Start: 2020-03-09 | End: 2020-06-04

## 2020-03-09 RX ORDER — CITALOPRAM 20 MG/1
TABLET ORAL
Qty: 90 TABLET | Refills: 0 | Status: SHIPPED | OUTPATIENT
Start: 2020-03-09 | End: 2020-06-04

## 2020-03-10 ENCOUNTER — PATIENT MESSAGE (OUTPATIENT)
Dept: INTERNAL MEDICINE CLINIC | Facility: CLINIC | Age: 60
End: 2020-03-10

## 2020-03-10 RX ORDER — VALACYCLOVIR HYDROCHLORIDE 1 G/1
TABLET, FILM COATED ORAL
Qty: 48 TABLET | Refills: 0 | Status: SHIPPED | OUTPATIENT
Start: 2020-03-10 | End: 2021-12-16

## 2020-03-11 ENCOUNTER — TELEPHONE (OUTPATIENT)
Dept: INTERNAL MEDICINE CLINIC | Facility: CLINIC | Age: 60
End: 2020-03-11

## 2020-03-11 RX ORDER — FLUCONAZOLE 150 MG/1
150 TABLET ORAL ONCE
Qty: 1 TABLET | Refills: 0 | Status: SHIPPED | OUTPATIENT
Start: 2020-03-11 | End: 2020-03-11

## 2020-03-11 RX ORDER — PROMETHAZINE HYDROCHLORIDE AND CODEINE PHOSPHATE 6.25; 1 MG/5ML; MG/5ML
2.5 SYRUP ORAL EVERY 6 HOURS PRN
Qty: 118 ML | Refills: 0 | Status: SHIPPED | OUTPATIENT
Start: 2020-03-11 | End: 2021-12-16

## 2020-03-11 RX ORDER — BENZONATATE 200 MG/1
200 CAPSULE ORAL 3 TIMES DAILY PRN
Qty: 30 CAPSULE | Refills: 0 | Status: SHIPPED | OUTPATIENT
Start: 2020-03-11 | End: 2020-04-10

## 2020-03-11 NOTE — TELEPHONE ENCOUNTER
Indu Turpin 3/10/2020 7:03 AM CDT      ----- Message -----  From: Chaz Schwarz  Sent: 3/10/2020 2:25 AM CDT  To: Kanika Molina Clinical Staff  Subject: Prescription Question     Hi   I have a cough I think it's sinus draining   I do feel better sinus wise

## 2020-03-11 NOTE — TELEPHONE ENCOUNTER
Message received from [facility]: call patient back regarding medications        Called pt back could not leave a message mailbox full.

## 2020-03-13 ENCOUNTER — TELEPHONE (OUTPATIENT)
Dept: GASTROENTEROLOGY | Facility: CLINIC | Age: 60
End: 2020-03-13

## 2020-03-13 NOTE — TELEPHONE ENCOUNTER
Cancelled for:  Colonoscopy 21670 and EGD [address]  Provider Name: Dr. Umanzor Rank  Date:  3/24/20  Location:  [facility]  Sedation:  MAC  Time:   1500    Prep:  Suprep  Meds/Allergies Reconciled?:  Physician reviewed   Diagnosis with codes:  Diarrhea R19.7, Abdominal elmer

## 2020-03-13 NOTE — TELEPHONE ENCOUNTER
Pt called to cancel 3/24/20 colonoscopy due to concerns about Corona virus. Pt will call back at a later date to reschedule.

## 2020-03-25 ENCOUNTER — PATIENT MESSAGE (OUTPATIENT)
Dept: INTERNAL MEDICINE CLINIC | Facility: CLINIC | Age: 60
End: 2020-03-25

## 2020-03-25 DIAGNOSIS — H10.32 ACUTE BACTERIAL CONJUNCTIVITIS OF LEFT EYE: ICD-10-CM

## 2020-03-25 DIAGNOSIS — J32.9 SINUSITIS, UNSPECIFIED CHRONICITY, UNSPECIFIED LOCATION: ICD-10-CM

## 2020-03-25 RX ORDER — DOXYCYCLINE HYCLATE 100 MG
100 TABLET ORAL 2 TIMES DAILY
Qty: 20 TABLET | Refills: 0 | Status: SHIPPED | OUTPATIENT
Start: 2020-03-25 | End: 2020-03-26

## 2020-03-25 NOTE — TELEPHONE ENCOUNTER
From: Gildardo Emerson  To:  Haris Henley MD  Sent: 3/25/2020 9:01 AM CDT  Subject: Visit Follow-up Question    Good Morning   hope all is well so far so good here   we are on lockdown going on 2weeks only getting deliveries and some drive up grocery pic

## 2020-03-26 RX ORDER — CETIRIZINE HYDROCHLORIDE, PSEUDOEPHEDRINE HYDROCHLORIDE 5; 120 MG/1; MG/1
TABLET, FILM COATED, EXTENDED RELEASE ORAL
Qty: 12 TABLET | Refills: 1 | Status: SHIPPED | OUTPATIENT
Start: 2020-03-26 | End: 2021-12-16

## 2020-03-26 RX ORDER — CETIRIZINE HCL/PSEUDOEPHEDRINE 5 MG-120MG
TABLET, EXTENDED RELEASE 12 HR ORAL
Qty: 12 TABLET | Refills: 1 | Status: SHIPPED | OUTPATIENT
Start: 2020-03-26 | End: 2020-03-26

## 2020-03-26 RX ORDER — DOXYCYCLINE HYCLATE 100 MG
100 TABLET ORAL 2 TIMES DAILY
Qty: 20 TABLET | Refills: 0 | Status: SHIPPED | OUTPATIENT
Start: 2020-03-26 | End: 2020-04-05

## 2020-05-10 DIAGNOSIS — F32.A ANXIETY AND DEPRESSION: ICD-10-CM

## 2020-05-10 DIAGNOSIS — J01.90 ACUTE BACTERIAL RHINOSINUSITIS: ICD-10-CM

## 2020-05-10 DIAGNOSIS — F41.1 GENERALIZED ANXIETY DISORDER: ICD-10-CM

## 2020-05-10 DIAGNOSIS — Z13.6 SCREENING FOR HEART DISEASE: ICD-10-CM

## 2020-05-10 DIAGNOSIS — F41.9 ANXIETY AND DEPRESSION: ICD-10-CM

## 2020-05-10 DIAGNOSIS — Z00.00 ROUTINE GENERAL MEDICAL EXAMINATION AT A HEALTH CARE FACILITY: ICD-10-CM

## 2020-05-10 DIAGNOSIS — M25.50 POLYARTHRALGIA: ICD-10-CM

## 2020-05-10 DIAGNOSIS — K21.9 GASTROESOPHAGEAL REFLUX DISEASE, ESOPHAGITIS PRESENCE NOT SPECIFIED: ICD-10-CM

## 2020-05-10 DIAGNOSIS — B96.89 ACUTE BACTERIAL RHINOSINUSITIS: ICD-10-CM

## 2020-05-11 RX ORDER — FLUCONAZOLE 150 MG/1
150 TABLET ORAL
Qty: 3 TABLET | Refills: 0 | OUTPATIENT
Start: 2020-05-11

## 2020-05-11 RX ORDER — CLOTRIMAZOLE 1 %
CREAM (GRAM) TOPICAL
Qty: 45 G | Refills: 1 | OUTPATIENT
Start: 2020-05-11

## 2020-05-11 RX ORDER — FLUCONAZOLE 150 MG/1
TABLET ORAL
Qty: 1 TABLET | Refills: 0 | OUTPATIENT
Start: 2020-05-11

## 2020-05-11 RX ORDER — LORAZEPAM 1 MG/1
1 TABLET ORAL 2 TIMES DAILY
Qty: 60 TABLET | Refills: 0 | Status: SHIPPED | OUTPATIENT
Start: 2020-05-11 | End: 2020-07-15

## 2020-05-11 RX ORDER — LORAZEPAM 1 MG/1
TABLET ORAL
Qty: 60 TABLET | Refills: 0 | OUTPATIENT
Start: 2020-05-11

## 2020-05-12 ENCOUNTER — PATIENT MESSAGE (OUTPATIENT)
Dept: INTERNAL MEDICINE CLINIC | Facility: CLINIC | Age: 60
End: 2020-05-12

## 2020-05-12 ENCOUNTER — VIRTUAL PHONE E/M (OUTPATIENT)
Dept: INTERNAL MEDICINE CLINIC | Facility: CLINIC | Age: 60
End: 2020-05-12
Payer: COMMERCIAL

## 2020-05-12 DIAGNOSIS — N76.0 ACUTE VAGINITIS: Primary | ICD-10-CM

## 2020-05-12 DIAGNOSIS — N89.8 VAGINAL IRRITATION: ICD-10-CM

## 2020-05-12 PROCEDURE — 99213 OFFICE O/P EST LOW 20 MIN: CPT | Performed by: INTERNAL MEDICINE

## 2020-05-12 RX ORDER — FLUCONAZOLE 150 MG/1
150 TABLET ORAL
Qty: 3 TABLET | Refills: 0 | Status: SHIPPED | OUTPATIENT
Start: 2020-05-12 | End: 2020-05-12

## 2020-05-12 RX ORDER — CLOTRIMAZOLE AND BETAMETHASONE DIPROPIONATE 10; .64 MG/G; MG/G
1 CREAM TOPICAL 2 TIMES DAILY
Qty: 1 TUBE | Refills: 0 | Status: SHIPPED | OUTPATIENT
Start: 2020-05-12 | End: 2020-05-22

## 2020-05-12 RX ORDER — FLUCONAZOLE 150 MG/1
150 TABLET ORAL
Qty: 3 TABLET | Refills: 0 | Status: SHIPPED | OUTPATIENT
Start: 2020-05-12 | End: 2021-12-16

## 2020-05-12 RX ORDER — CLOTRIMAZOLE AND BETAMETHASONE DIPROPIONATE 10; .64 MG/G; MG/G
1 CREAM TOPICAL 2 TIMES DAILY
Qty: 1 TUBE | Refills: 0 | Status: SHIPPED | OUTPATIENT
Start: 2020-05-12 | End: 2020-05-12

## 2020-05-12 NOTE — PROGRESS NOTES
Virtual Telephone Check-In    Madisyn Vilchis verbally consents to a Virtual/Telephone Check-In visit on 05/12/20. Patient understands and accepts financial responsibility for any deductible, co-insurance and/or co-pays associated with this service. GM/177ML Oral Solution As directed. (Patient not taking: Reported on 1/22/2020 ) 1 Bottle 0   • famoTIDine 20 MG Oral Tab Take 1 tablet (20 mg total) by mouth nightly as needed for Heartburn.  90 tablet 3   • Omeprazole 40 MG Oral Capsule Delayed Release Ta Grandmother    • Heart Disease Maternal Grandmother         CAD     Past Medical History:   Diagnosis Date   • Anxiety    • Anxiety and depression 11/10/2017   • Arthritis    • Fibromyalgia       Past Surgical History:   Procedure Laterality Date   • C-SEC allow for sufficient and adequate time. This billing was spent on reviewing labs, medications, radiology tests and decision making which were discussed and communicated with the patient.   Appropriate medical decision-making and tests are ordered as detai

## 2020-06-03 DIAGNOSIS — F32.A ANXIETY AND DEPRESSION: ICD-10-CM

## 2020-06-03 DIAGNOSIS — F41.9 ANXIETY AND DEPRESSION: ICD-10-CM

## 2020-06-03 DIAGNOSIS — R39.9 LOWER URINARY TRACT SYMPTOMS: ICD-10-CM

## 2020-06-03 DIAGNOSIS — R19.7 DIARRHEA, UNSPECIFIED TYPE: ICD-10-CM

## 2020-06-03 DIAGNOSIS — R14.0 ABDOMINAL BLOATING: ICD-10-CM

## 2020-06-04 RX ORDER — CITALOPRAM 20 MG/1
TABLET ORAL
Qty: 90 TABLET | Refills: 0 | Status: SHIPPED | OUTPATIENT
Start: 2020-06-04 | End: 2021-12-16

## 2020-06-04 RX ORDER — TRAZODONE HYDROCHLORIDE 50 MG/1
TABLET ORAL
Qty: 90 TABLET | Refills: 0 | Status: SHIPPED | OUTPATIENT
Start: 2020-06-04 | End: 2021-12-16

## 2020-06-16 RX ORDER — ERGOCALCIFEROL 1.25 MG/1
50000 CAPSULE ORAL WEEKLY
Qty: 12 CAPSULE | Refills: 1 | Status: SHIPPED | OUTPATIENT
Start: 2020-06-16 | End: 2020-11-27

## 2020-06-24 ENCOUNTER — TELEMEDICINE (OUTPATIENT)
Dept: INTERNAL MEDICINE CLINIC | Facility: CLINIC | Age: 60
End: 2020-06-24

## 2020-06-24 DIAGNOSIS — R07.89 ATYPICAL CHEST PAIN: ICD-10-CM

## 2020-06-24 DIAGNOSIS — K21.9 GASTROESOPHAGEAL REFLUX DISEASE, ESOPHAGITIS PRESENCE NOT SPECIFIED: ICD-10-CM

## 2020-06-24 DIAGNOSIS — R12 HEART BURN: Primary | ICD-10-CM

## 2020-06-24 PROCEDURE — 93000 ELECTROCARDIOGRAM COMPLETE: CPT | Performed by: INTERNAL MEDICINE

## 2020-06-24 PROCEDURE — 99213 OFFICE O/P EST LOW 20 MIN: CPT | Performed by: INTERNAL MEDICINE

## 2020-06-24 RX ORDER — PANTOPRAZOLE SODIUM 40 MG/1
40 TABLET, DELAYED RELEASE ORAL
Qty: 30 TABLET | Refills: 1 | Status: SHIPPED | OUTPATIENT
Start: 2020-06-24 | End: 2020-07-16

## 2020-06-24 NOTE — PROGRESS NOTES
This visit was conducted using telemedicine with live interactive video and audio   Patient verbally consents to conduct video visit   Patient understands and accepts financial responsibility for any deductible, co-insurance and/or co-pays associated with Comment: Rarely      Drug use: No      Sexual activity: Not on file    Lifestyle      Physical activity:        Days per week: Not on file        Minutes per session: Not on file      Stress: Not on file    Relationships      Social connections:         Ta Cetirizine-Pseudoephedrine ER (ZYRTEC-D ALLERGY & CONGESTION) 5-120 MG Oral Tablet 12 Hr TAKE 1 TABLET BY MOUTH TWICE A DAY FOR 7 DAYS 12 tablet 1   • promethazine-codeine 6.25-10 MG/5ML Oral Syrup Take 2.5 mL by mouth every 6 (six) hours as needed for cou hydrocortisone 2.5 % External Cream Apply 1 Application topically 2 (two) times daily.  Prn itching to affected areas 1 Tube 0     Patient Active Problem List:     Abnormal results of thyroid function studies     Routine general medical examination at a [facility] Discussed the importance to see gastro and to have the EGD     Meds & Refills for this Visit:  Requested Prescriptions     Signed Prescriptions Disp Refills   • Pantoprazole Sodium 40 MG Oral Tab EC 30 tablet 1     Sig: Take 1 tablet (40 mg total) by richard

## 2020-06-25 NOTE — PROGRESS NOTES
Patient came in after her virtual appt same day to have EKG done in the office   ekg with normal sinus R no acute st t wave changes   Some artifact   reassured patient and advised to have CT calcium score to assess her cardiovascular health and screen for

## 2020-06-25 NOTE — PATIENT INSTRUCTIONS
Tips to Control Acid Reflux    To control acid reflux, you’ll need to make some basic diet and lifestyle changes. The simple steps outlined below may be all you’ll need to ease discomfort. Watch what you eat  · Don't have fatty foods or spicy foods.   · · Slide blocks or books under the legs at the head of your bed. Or put a wedge under the mattress. Many foam stores can make a wedge for you. The wedge should go from your waist to the top of your head. · Don’t just prop your head up on a few pillows.  Marlen Mason

## 2020-07-06 ENCOUNTER — PATIENT MESSAGE (OUTPATIENT)
Dept: GASTROENTEROLOGY | Facility: CLINIC | Age: 60
End: 2020-07-06

## 2020-07-07 NOTE — TELEPHONE ENCOUNTER
From: Ena Silva  To: Caitlin Welch MD  Sent: 7/6/2020 1:47 PM CDT  Subject: Other    good afternoon   I am having some symptoms after not having any since feb . ..acid reflux nauseous burping gas . .. dr Yvon Mon gave me a new acid reflux pill pantopra

## 2020-07-09 NOTE — TELEPHONE ENCOUNTER
Kathia Goodson,    Dr. Kermit Goldberg said you can either schedule an EGD/colonoscopy or you can schedule a video Doximity visit on Wednesday 07/15 at 12:30 pm. . Please call our office at [phone] to schedule.     Thank you for using Gail Angel RN    Pl

## 2020-07-09 NOTE — TELEPHONE ENCOUNTER
Patient was suppose to have EGD/colon prior to COVID but not scheduled/canceled  Offer to schedule EGD/colon per orders or can do video- Doximity visit on Wednesday 7/15 at 1230pm

## 2020-07-15 ENCOUNTER — TELEMEDICINE (OUTPATIENT)
Dept: GASTROENTEROLOGY | Facility: CLINIC | Age: 60
End: 2020-07-15

## 2020-07-15 ENCOUNTER — OFFICE VISIT (OUTPATIENT)
Dept: INTERNAL MEDICINE CLINIC | Facility: CLINIC | Age: 60
End: 2020-07-15
Payer: COMMERCIAL

## 2020-07-15 ENCOUNTER — TELEPHONE (OUTPATIENT)
Dept: GASTROENTEROLOGY | Facility: CLINIC | Age: 60
End: 2020-07-15

## 2020-07-15 VITALS
BODY MASS INDEX: 40.43 KG/M2 | OXYGEN SATURATION: 97 % | HEIGHT: 64 IN | DIASTOLIC BLOOD PRESSURE: 82 MMHG | WEIGHT: 236.81 LBS | SYSTOLIC BLOOD PRESSURE: 126 MMHG | HEART RATE: 88 BPM

## 2020-07-15 DIAGNOSIS — F41.9 ANXIETY AND DEPRESSION: ICD-10-CM

## 2020-07-15 DIAGNOSIS — K21.9 GASTROESOPHAGEAL REFLUX DISEASE, ESOPHAGITIS PRESENCE NOT SPECIFIED: ICD-10-CM

## 2020-07-15 DIAGNOSIS — Z00.00 ANNUAL PHYSICAL EXAM: Primary | ICD-10-CM

## 2020-07-15 DIAGNOSIS — J01.90 ACUTE BACTERIAL RHINOSINUSITIS: ICD-10-CM

## 2020-07-15 DIAGNOSIS — H04.129 DRY EYE: ICD-10-CM

## 2020-07-15 DIAGNOSIS — Z00.00 ROUTINE GENERAL MEDICAL EXAMINATION AT A HEALTH CARE FACILITY: ICD-10-CM

## 2020-07-15 DIAGNOSIS — M25.50 POLYARTHRALGIA: ICD-10-CM

## 2020-07-15 DIAGNOSIS — F32.A ANXIETY AND DEPRESSION: ICD-10-CM

## 2020-07-15 DIAGNOSIS — F41.1 GENERALIZED ANXIETY DISORDER: ICD-10-CM

## 2020-07-15 DIAGNOSIS — Z90.711 S/P PARTIAL HYSTERECTOMY: ICD-10-CM

## 2020-07-15 DIAGNOSIS — B96.89 ACUTE BACTERIAL RHINOSINUSITIS: ICD-10-CM

## 2020-07-15 DIAGNOSIS — Z13.6 SCREENING FOR HEART DISEASE: ICD-10-CM

## 2020-07-15 DIAGNOSIS — K21.9 GASTROESOPHAGEAL REFLUX DISEASE, ESOPHAGITIS PRESENCE NOT SPECIFIED: Primary | ICD-10-CM

## 2020-07-15 DIAGNOSIS — L21.9 SEBORRHEIC DERMATITIS: ICD-10-CM

## 2020-07-15 DIAGNOSIS — B35.9 TINEA: ICD-10-CM

## 2020-07-15 LAB
ALBUMIN SERPL-MCNC: 4.3 G/DL (ref 3.4–5)
ALBUMIN/GLOB SERPL: 1.1 {RATIO} (ref 1–2)
ALP LIVER SERPL-CCNC: 89 U/L (ref 46–118)
ALT SERPL-CCNC: 30 U/L (ref 13–56)
ANION GAP SERPL CALC-SCNC: 8 MMOL/L (ref 0–18)
AST SERPL-CCNC: 17 U/L (ref 15–37)
BASOPHILS # BLD AUTO: 0.02 X10(3) UL (ref 0–0.2)
BASOPHILS NFR BLD AUTO: 0.4 %
BILIRUB SERPL-MCNC: 0.6 MG/DL (ref 0.1–2)
BUN BLD-MCNC: 15 MG/DL (ref 7–18)
BUN/CREAT SERPL: 19.5 (ref 10–20)
CALCIUM BLD-MCNC: 9.4 MG/DL (ref 8.5–10.1)
CHLORIDE SERPL-SCNC: 109 MMOL/L (ref 98–112)
CHOLEST SMN-MCNC: 208 MG/DL (ref ?–200)
CO2 SERPL-SCNC: 25 MMOL/L (ref 21–32)
CREAT BLD-MCNC: 0.77 MG/DL (ref 0.55–1.02)
DEPRECATED RDW RBC AUTO: 43.1 FL (ref 35.1–46.3)
EOSINOPHIL # BLD AUTO: 0.08 X10(3) UL (ref 0–0.7)
EOSINOPHIL NFR BLD AUTO: 1.8 %
ERYTHROCYTE [DISTWIDTH] IN BLOOD BY AUTOMATED COUNT: 13.2 % (ref 11–15)
GLOBULIN PLAS-MCNC: 3.8 G/DL (ref 2.8–4.4)
GLUCOSE BLD-MCNC: 84 MG/DL (ref 70–99)
HCT VFR BLD AUTO: 42.1 % (ref 35–48)
HDLC SERPL-MCNC: 59 MG/DL (ref 40–59)
HGB BLD-MCNC: 14 G/DL (ref 12–16)
IMM GRANULOCYTES # BLD AUTO: 0.04 X10(3) UL (ref 0–1)
IMM GRANULOCYTES NFR BLD: 0.9 %
LDLC SERPL CALC-MCNC: 127 MG/DL (ref ?–100)
LYMPHOCYTES # BLD AUTO: 1.12 X10(3) UL (ref 1–4)
LYMPHOCYTES NFR BLD AUTO: 25 %
M PROTEIN MFR SERPL ELPH: 8.1 G/DL (ref 6.4–8.2)
MCH RBC QN AUTO: 29.9 PG (ref 26–34)
MCHC RBC AUTO-ENTMCNC: 33.3 G/DL (ref 31–37)
MCV RBC AUTO: 89.8 FL (ref 80–100)
MONOCYTES # BLD AUTO: 0.27 X10(3) UL (ref 0.1–1)
MONOCYTES NFR BLD AUTO: 6 %
NEUTROPHILS # BLD AUTO: 2.95 X10 (3) UL (ref 1.5–7.7)
NEUTROPHILS # BLD AUTO: 2.95 X10(3) UL (ref 1.5–7.7)
NEUTROPHILS NFR BLD AUTO: 65.9 %
NONHDLC SERPL-MCNC: 149 MG/DL (ref ?–130)
OSMOLALITY SERPL CALC.SUM OF ELEC: 294 MOSM/KG (ref 275–295)
PATIENT FASTING Y/N/NP: YES
PATIENT FASTING Y/N/NP: YES
PLATELET # BLD AUTO: 207 10(3)UL (ref 150–450)
POTASSIUM SERPL-SCNC: 3.6 MMOL/L (ref 3.5–5.1)
RBC # BLD AUTO: 4.69 X10(6)UL (ref 3.8–5.3)
SODIUM SERPL-SCNC: 142 MMOL/L (ref 136–145)
TRIGL SERPL-MCNC: 112 MG/DL (ref 30–149)
TSI SER-ACNC: 0.94 MIU/ML (ref 0.36–3.74)
VLDLC SERPL CALC-MCNC: 22 MG/DL (ref 0–30)
WBC # BLD AUTO: 4.5 X10(3) UL (ref 4–11)

## 2020-07-15 PROCEDURE — 99396 PREV VISIT EST AGE 40-64: CPT | Performed by: INTERNAL MEDICINE

## 2020-07-15 PROCEDURE — 80061 LIPID PANEL: CPT | Performed by: INTERNAL MEDICINE

## 2020-07-15 PROCEDURE — 80050 GENERAL HEALTH PANEL: CPT | Performed by: INTERNAL MEDICINE

## 2020-07-15 PROCEDURE — 99214 OFFICE O/P EST MOD 30 MIN: CPT | Performed by: INTERNAL MEDICINE

## 2020-07-15 PROCEDURE — 86235 NUCLEAR ANTIGEN ANTIBODY: CPT | Performed by: INTERNAL MEDICINE

## 2020-07-15 RX ORDER — LORAZEPAM 1 MG/1
TABLET ORAL
Qty: 60 TABLET | Refills: 0 | Status: SHIPPED | OUTPATIENT
Start: 2020-07-15 | End: 2020-09-22

## 2020-07-15 RX ORDER — KETOCONAZOLE 20 MG/ML
1 SHAMPOO TOPICAL
Qty: 1 BOTTLE | Refills: 2 | Status: SHIPPED | OUTPATIENT
Start: 2020-07-16 | End: 2020-08-15

## 2020-07-15 RX ORDER — CLOTRIMAZOLE AND BETAMETHASONE DIPROPIONATE 10; .64 MG/G; MG/G
1 CREAM TOPICAL 2 TIMES DAILY PRN
Qty: 1 TUBE | Refills: 1 | Status: SHIPPED | OUTPATIENT
Start: 2020-07-15 | End: 2020-10-29

## 2020-07-15 NOTE — PATIENT INSTRUCTIONS
Gwen Dixon is a 61year old year-old female with history of  depression, anxiety, HTN, reflux, obesity, hx of hysterectomy who presents for follow up for reflux, improved diarrhea and screening colonsocopy     1.  No further diarrhea but did have leta

## 2020-07-15 NOTE — PROGRESS NOTES
This visit is conducted using Telemedicine with live, interactive video and audio. Patient has been referred to the [facility] website at [email or web address] to review the yearly Consent to Treat document.     Patient understands and accepts financial res elevated. CT abdomen pelvis as well and no inflammation was seen. There was diverticulosis. Was on NSAIDs. Was taking aleve and advil PM. Does have abdominal pain but it helps. Working on losing weight with weight gain. No blood in the stool.  No urgen Refill   • Sertraline HCl 50 MG Oral Tab Take 1 tablet (50 mg total) by mouth daily. 30 tablet 1   • [START ON 7/16/2020] Ketoconazole 2 % External Shampoo Apply 1 mL topically twice a week.  1 Bottle 2   • clotrimazole-betamethasone 1-0.05 % External Cream 90 tablet 3   • Omeprazole 40 MG Oral Capsule Delayed Release Take 1 capsule (40 mg total) by mouth once daily.  (Patient not taking: Reported on 1/22/2020 ) 90 capsule 1   • FLUTICASONE PROPIONATE 50 MCG/ACT Nasal Suspension SPRAY 2 SPRAYS INTO EACH NOSTRI breathing, Skin color, texture, turgor normal. No rashes or lesions and age appropriate    Labs/Imaging:     Patient's labs and imaging were reviewed and discussed with patient today. Recent Labs     10/11/17  1344 12/04/18  0946 10/07/19  1015   RBC 4. If MAC @ [facility] or IV twilight - continue all medications as prescribed       EGD consent: I have discussed the risks (including risk of delayed/missed diagnosis), benefits, and alternatives to upper endoscopy/enteroscopy with the patient [who demonstrated un

## 2020-07-15 NOTE — TELEPHONE ENCOUNTER
Please call patient within 48 hours to schedule EGD at [facility]   Also needs cologuard       1. No further diarrhea but did have elevated calprotectin. Does not want colonoscopy right now  2. Gastroesophageal reflux disease, esophagitis presence not specified.

## 2020-07-15 NOTE — TELEPHONE ENCOUNTER
Cologuard order placed on your desk for signature     Routed high priority to the Surgery Schedulers

## 2020-07-15 NOTE — PROGRESS NOTES
Roro Bey is a 61year old female.     Chief complaint: annual physical exam       HPI:   Annual physical exam   No chest pain no sob no abdominal pain  Alternating diarrhea and constipation   Doesn't take probiotics   Has been eating yogurt   No fe TAKE 2 TABLETS BY MOUTH EVERY 12 HOURS 48 tablet 0   • DICYCLOMINE HCL 10 MG Oral Cap TAKE 1 CAPSULE (10 MG TOTAL) BY MOUTH 3 (THREE) TIMES DAILY AS NEEDED. 30 capsule 1   • methylPREDNISolone (MEDROL) 4 MG Oral Tablet Therapy Pack As directed.  No NSAIDS w •       x2   • CHOLECYSTECTOMY      Open elidia. • ELECTROCARDIOGRAM, COMPLETE  2013    SCANNED TO MEDIA TAB - 2013   • HYSTERECTOMY       partial, No BSO, no abNL paps.     • REPAIR ROTATOR CUFF,ACUTE          Social Hi tenderness  EXTREMITIES: no cyanosis, clubbing or edema  NEURO: no gross deficits              No orders of the defined types were placed in this encounter. ASSESSMENT AND PLAN:     1.  Annual physical exam  Advised diet and exercise   Up to date wit DIFFERENTIAL WITH PLATELET  - COMP METABOLIC PANEL (14)  - LIPID PANEL  - TSH W REFLEX TO FREE T4  - SJOGREN'S ANTI-SS-B  - SJOGREN'S ANTI-SS-A  - CBC W/ DIFFERENTIAL    3.  S/P partial hysterectomy  Stable no alarming signs   - CBC WITH DIFFERENTIAL WITH P COMP METABOLIC PANEL (14); Future  - LIPID PANEL; Future  - TSH W REFLEX TO FREE T4; Future  - SJOGREN'S ANTI-SS-B; Future  - SJOGREN'S ANTI-SS-A; Future  - Sertraline HCl 50 MG Oral Tab; Take 1 tablet (50 mg total) by mouth daily. Dispense: 30 tablet;  Re

## 2020-07-15 NOTE — TELEPHONE ENCOUNTER
Dr Sue Griffin Kelly Litter BMI is currently 40.63, CFH is anyone under 36. Would you like me to schedule patient at [address] [place] [address]?

## 2020-07-16 DIAGNOSIS — R12 HEART BURN: ICD-10-CM

## 2020-07-16 DIAGNOSIS — K21.9 GASTROESOPHAGEAL REFLUX DISEASE, ESOPHAGITIS PRESENCE NOT SPECIFIED: ICD-10-CM

## 2020-07-16 DIAGNOSIS — R07.89 ATYPICAL CHEST PAIN: ICD-10-CM

## 2020-07-16 RX ORDER — PANTOPRAZOLE SODIUM 40 MG/1
TABLET, DELAYED RELEASE ORAL
Qty: 30 TABLET | Refills: 1 | Status: SHIPPED | OUTPATIENT
Start: 2020-07-16 | End: 2020-07-17

## 2020-07-16 NOTE — TELEPHONE ENCOUNTER
I think anesthesia would be ok with it.  I would prefer to keep it at [facility] per patient preference if ok with anesthesia

## 2020-07-17 ENCOUNTER — PATIENT MESSAGE (OUTPATIENT)
Dept: GASTROENTEROLOGY | Facility: CLINIC | Age: 60
End: 2020-07-17

## 2020-07-17 RX ORDER — PANTOPRAZOLE SODIUM 40 MG/1
40 TABLET, DELAYED RELEASE ORAL
Qty: 30 TABLET | Refills: 0 | Status: SHIPPED | OUTPATIENT
Start: 2020-07-17 | End: 2020-08-10

## 2020-07-17 NOTE — TELEPHONE ENCOUNTER
Dr Renuka Munoz,    I changed the pharmacy to [address] [place] [address]. Please send a new pantoprazole prescription.

## 2020-07-17 NOTE — TELEPHONE ENCOUNTER
Spoke to Unknown Champagne from [facility], she transferred me to PAT nurse to get ok to schedule patient at [facility]. Left a voicemail to call back at [phone] or [phone] and ask for GI schedulers.     Central scheduling if PAT from [facility] calls please transfer to GI SCHEDULERS or

## 2020-07-17 NOTE — TELEPHONE ENCOUNTER
Called patient to inform her I am waiting for an OK from [facility] to schedule procedure. CENTRAL SCHEDULING if [facility] calls please transfer to GI  or  [facility].

## 2020-07-17 NOTE — TELEPHONE ENCOUNTER
From: Kaleb Marcos  To: Kimberlee Najera MD  Sent: 7/17/2020 9:40 AM CDT  Subject: Prescription Question    Good morning I need a script for pantoprazole the one I currently have is for 1 a day and I have 4 left so I need one to reflect twice a day   And

## 2020-07-20 ENCOUNTER — HOSPITAL ENCOUNTER (OUTPATIENT)
Dept: ULTRASOUND IMAGING | Facility: HOSPITAL | Age: 60
Discharge: HOME | End: 2020-07-20
Attending: INTERNAL MEDICINE
Payer: COMMERCIAL

## 2020-07-20 DIAGNOSIS — R92.8 ABNORMAL MAMMOGRAM: ICD-10-CM

## 2020-07-20 PROCEDURE — 76642 ULTRASOUND BREAST LIMITED: CPT | Performed by: INTERNAL MEDICINE

## 2020-07-21 LAB
ENA SS-A AB SER QL IA: NEGATIVE
ENA SS-B AB SER QL IA: NEGATIVE

## 2020-07-21 NOTE — TELEPHONE ENCOUNTER
Called patient to schedule procedure, unable to leave a voicemail. Please send message or transfer to  [facility] . GIOVANNA SCHEDULERS- Per Bhumi [place] at [facility] patient is ok to be seen at their facility with BMI of 40.63.

## 2020-07-28 ENCOUNTER — APPOINTMENT (OUTPATIENT)
Dept: LAB | Age: 60
End: 2020-07-28
Attending: INTERNAL MEDICINE
Payer: COMMERCIAL

## 2020-07-28 DIAGNOSIS — K21.9 GASTROESOPHAGEAL REFLUX DISEASE, ESOPHAGITIS PRESENCE NOT SPECIFIED: ICD-10-CM

## 2020-07-28 PROCEDURE — 83993 ASSAY FOR CALPROTECTIN FECAL: CPT

## 2020-07-30 NOTE — TELEPHONE ENCOUNTER
Scheduled for:  EGD 74911  Provider Name:  Dr. Garrison Essex  Date:  8/11/20  Location:    [facility]  Sedation:  MAC  Time:  1330 (pt is aware that [facility] will call the day before to confirm arrival time)   Prep:  Nothing to eat after midnight   Nothing to drink 3 hours pr

## 2020-07-30 NOTE — TELEPHONE ENCOUNTER
This is a duplicate TE.  Please see the same date of this TE 7/15/20 for scheduling information    Closing this TE

## 2020-07-31 LAB — CALPROTECTIN STL-MCNT: 206 ΜG/G (ref ?–50)

## 2020-08-04 ENCOUNTER — TELEPHONE (OUTPATIENT)
Dept: GASTROENTEROLOGY | Facility: CLINIC | Age: 60
End: 2020-08-04

## 2020-08-04 NOTE — TELEPHONE ENCOUNTER
----- Message from Amarilis Menjivar MD sent at 8/4/2020  3:15 PM CDT -----  Patient scheduled for EGD at [facility] next week tuesday  Please add on colonoscopy for screening and for elevated calprotectin  Per patient she has instructions but can you call and confirm

## 2020-08-04 NOTE — TELEPHONE ENCOUNTER
Patient is aware Colonoscopy will be done on her procedure date. Went over prep instructions as well as sent a copy via [employer]. Patient stated she has suprep at home.     Scheduled for: COLON 69629 EGD 10926  Provider Name:  Dr. Meet Hook  Date:  8/11/20  [facility]

## 2020-08-08 ENCOUNTER — LAB REQUISITION (OUTPATIENT)
Dept: LAB | Facility: HOSPITAL | Age: 60
End: 2020-08-08
Payer: COMMERCIAL

## 2020-08-08 DIAGNOSIS — L21.9 SEBORRHEIC DERMATITIS: ICD-10-CM

## 2020-08-08 DIAGNOSIS — Z20.828 CONTACT WITH AND (SUSPECTED) EXPOSURE TO OTHER VIRAL COMMUNICABLE DISEASES: ICD-10-CM

## 2020-08-08 DIAGNOSIS — K21.9 GASTROESOPHAGEAL REFLUX DISEASE, ESOPHAGITIS PRESENCE NOT SPECIFIED: ICD-10-CM

## 2020-08-08 DIAGNOSIS — B35.9 TINEA: ICD-10-CM

## 2020-08-08 DIAGNOSIS — Z00.00 ANNUAL PHYSICAL EXAM: ICD-10-CM

## 2020-08-08 DIAGNOSIS — F32.A ANXIETY AND DEPRESSION: ICD-10-CM

## 2020-08-08 DIAGNOSIS — F41.9 ANXIETY AND DEPRESSION: ICD-10-CM

## 2020-08-08 DIAGNOSIS — Z90.711 S/P PARTIAL HYSTERECTOMY: ICD-10-CM

## 2020-08-09 DIAGNOSIS — R07.89 OTHER CHEST PAIN: ICD-10-CM

## 2020-08-09 DIAGNOSIS — R12 HEARTBURN: ICD-10-CM

## 2020-08-09 LAB — SARS-COV-2 RNA RESP QL NAA+PROBE: NOT DETECTED

## 2020-08-10 RX ORDER — PANTOPRAZOLE SODIUM 40 MG/1
TABLET, DELAYED RELEASE ORAL
Qty: 30 TABLET | Refills: 1 | Status: SHIPPED | OUTPATIENT
Start: 2020-08-10 | End: 2020-09-04

## 2020-08-11 ENCOUNTER — LAB REQUISITION (OUTPATIENT)
Dept: LAB | Facility: HOSPITAL | Age: 60
End: 2020-08-11
Payer: COMMERCIAL

## 2020-08-11 ENCOUNTER — SURGERY CENTER DOCUMENTATION (OUTPATIENT)
Dept: SURGERY | Age: 60
End: 2020-08-11

## 2020-08-11 DIAGNOSIS — Z12.11 ENCOUNTER FOR SCREENING FOR MALIGNANT NEOPLASM OF COLON: ICD-10-CM

## 2020-08-11 PROCEDURE — 88305 TISSUE EXAM BY PATHOLOGIST: CPT | Performed by: INTERNAL MEDICINE

## 2020-08-11 PROCEDURE — 45385 COLONOSCOPY W/LESION REMOVAL: CPT | Performed by: INTERNAL MEDICINE

## 2020-08-11 PROCEDURE — 45380 COLONOSCOPY AND BIOPSY: CPT | Performed by: INTERNAL MEDICINE

## 2020-08-11 PROCEDURE — 88312 SPECIAL STAINS GROUP 1: CPT | Performed by: INTERNAL MEDICINE

## 2020-08-11 PROCEDURE — 88342 IMHCHEM/IMCYTCHM 1ST ANTB: CPT | Performed by: INTERNAL MEDICINE

## 2020-08-11 PROCEDURE — 43239 EGD BIOPSY SINGLE/MULTIPLE: CPT | Performed by: INTERNAL MEDICINE

## 2020-08-11 NOTE — PROCEDURES
ESOPHAGOGASTRODUODENOSCOPY (EGD) & COLONOSCOPY REPORT    Vandanacris Edita     1960 Age 61year old   PCP Zo Mg MD Endoscopist Deepthi Ruiz MD     Date of procedure: 20    Procedure: EGD w/biopsies & Colonoscopy w/biopsies and cold b Views of the colon were good with washing. I then carefully withdrew the instrument from the patient who tolerated the procedure well. Complications: None    EGD findings:      1. Esophagus:  The squamocolumnar junction was noted at 40 cm and appeared i cecal polyp and rectal polyp

## 2020-08-12 ENCOUNTER — TELEPHONE (OUTPATIENT)
Dept: INTERNAL MEDICINE CLINIC | Facility: CLINIC | Age: 60
End: 2020-08-12

## 2020-08-12 DIAGNOSIS — R39.9 UTI SYMPTOMS: Primary | ICD-10-CM

## 2020-08-12 RX ORDER — SULFAMETHOXAZOLE AND TRIMETHOPRIM 800; 160 MG/1; MG/1
1 TABLET ORAL 2 TIMES DAILY
Qty: 6 TABLET | Refills: 0 | Status: SHIPPED | OUTPATIENT
Start: 2020-08-12 | End: 2020-08-15

## 2020-08-12 NOTE — TELEPHONE ENCOUNTER
Pt thinks she has a bladder infection, she trys to urinate and nothing comes out. She thinks it may be due to dehydration from the colonoscopy prep from yesterday. Please advise. Pt is hoping Dr Manuel Orozco will call something in for her.

## 2020-08-17 ENCOUNTER — PATIENT MESSAGE (OUTPATIENT)
Dept: GASTROENTEROLOGY | Facility: CLINIC | Age: 60
End: 2020-08-17

## 2020-08-18 NOTE — TELEPHONE ENCOUNTER
Please advise on pathology for the pt. Final Diagnosis:      A.  Stomach; biopsy:  · Gastric antral and oxyntic type mucosa with mild chronic gastritis, reactive foveolar and cytologic changes, and intestinal metaplasia  · Negative for dysplasia  · Lubna Forbes

## 2020-08-18 NOTE — TELEPHONE ENCOUNTER
From: Obinna Decker  To: Lulu Reddy MD  Sent: 8/17/2020 8:10 PM CDT  Subject: Test Results Question    Hi sorry to bother you   But I have no clue what you said to me after test   I think you said don't take acid reflux medicine and I had 3 polyps?

## 2020-08-19 NOTE — TELEPHONE ENCOUNTER
GI staff: please place recall in for colonoscopy in 10 years   Recall EGD in 1 year pending symptoms

## 2020-08-20 ENCOUNTER — TELEPHONE (OUTPATIENT)
Dept: GASTROENTEROLOGY | Facility: CLINIC | Age: 60
End: 2020-08-20

## 2020-08-20 NOTE — TELEPHONE ENCOUNTER
GI staff: please place recall in for colonoscopy in 10 years     10 year colonoscopy recall placed in patient outreach per Dr. Efraín Butcher. Due on 08/11/2030. Health maintenance updated.

## 2020-08-20 NOTE — TELEPHONE ENCOUNTER
10 year colonoscopy recall placed in patient outreach per Dr. Dalia Velazco. Due on 08/11/2030. Health maintenance updated. 1 year EGD recall placed in patient outreach. Due 08/11/2021 per Dr. Dalia Velazco. Specialty comments updated.

## 2020-09-04 ENCOUNTER — TELEPHONE (OUTPATIENT)
Dept: INTERNAL MEDICINE CLINIC | Facility: CLINIC | Age: 60
End: 2020-09-04

## 2020-09-04 DIAGNOSIS — R07.89 OTHER CHEST PAIN: ICD-10-CM

## 2020-09-04 DIAGNOSIS — R12 HEARTBURN: ICD-10-CM

## 2020-09-04 RX ORDER — PANTOPRAZOLE SODIUM 40 MG/1
40 TABLET, DELAYED RELEASE ORAL
Qty: 90 TABLET | Refills: 1 | Status: SHIPPED | OUTPATIENT
Start: 2020-09-04

## 2020-09-15 ENCOUNTER — TELEPHONE (OUTPATIENT)
Dept: GASTROENTEROLOGY | Facility: CLINIC | Age: 60
End: 2020-09-15

## 2020-09-15 NOTE — TELEPHONE ENCOUNTER
Action requested form received from [employer] stating patient has not completed her cologuard test.  Patient completed colonoscopy and EGD on 08/11/2020  [facility] notified.

## 2020-09-20 DIAGNOSIS — M25.50 POLYARTHRALGIA: ICD-10-CM

## 2020-09-20 DIAGNOSIS — Z00.00 ROUTINE GENERAL MEDICAL EXAMINATION AT A HEALTH CARE FACILITY: ICD-10-CM

## 2020-09-20 DIAGNOSIS — F41.1 GENERALIZED ANXIETY DISORDER: ICD-10-CM

## 2020-09-20 DIAGNOSIS — B96.89 ACUTE BACTERIAL RHINOSINUSITIS: ICD-10-CM

## 2020-09-20 DIAGNOSIS — F32.A ANXIETY AND DEPRESSION: ICD-10-CM

## 2020-09-20 DIAGNOSIS — K21.9 GASTROESOPHAGEAL REFLUX DISEASE, ESOPHAGITIS PRESENCE NOT SPECIFIED: ICD-10-CM

## 2020-09-20 DIAGNOSIS — J01.90 ACUTE BACTERIAL RHINOSINUSITIS: ICD-10-CM

## 2020-09-20 DIAGNOSIS — F41.9 ANXIETY AND DEPRESSION: ICD-10-CM

## 2020-09-20 DIAGNOSIS — Z13.6 SCREENING FOR HEART DISEASE: ICD-10-CM

## 2020-09-22 RX ORDER — LORAZEPAM 1 MG/1
TABLET ORAL
Qty: 60 TABLET | Refills: 0 | Status: SHIPPED | OUTPATIENT
Start: 2020-09-22 | End: 2020-10-29

## 2020-09-23 ENCOUNTER — TELEPHONE (OUTPATIENT)
Dept: GASTROENTEROLOGY | Facility: CLINIC | Age: 60
End: 2020-09-23

## 2020-09-23 NOTE — TELEPHONE ENCOUNTER
Received a fax from [employer] stating Cologuard order has been cancelled per request of the office.  Pt completed Colonoscopy/EGD on 08/11/2020 @ the [facility]

## 2020-10-28 DIAGNOSIS — J01.90 ACUTE BACTERIAL RHINOSINUSITIS: ICD-10-CM

## 2020-10-28 DIAGNOSIS — Z90.711 S/P PARTIAL HYSTERECTOMY: ICD-10-CM

## 2020-10-28 DIAGNOSIS — F41.9 ANXIETY AND DEPRESSION: ICD-10-CM

## 2020-10-28 DIAGNOSIS — Z00.00 ROUTINE GENERAL MEDICAL EXAMINATION AT A HEALTH CARE FACILITY: ICD-10-CM

## 2020-10-28 DIAGNOSIS — B35.9 TINEA: ICD-10-CM

## 2020-10-28 DIAGNOSIS — K21.9 GASTROESOPHAGEAL REFLUX DISEASE: ICD-10-CM

## 2020-10-28 DIAGNOSIS — B96.89 ACUTE BACTERIAL RHINOSINUSITIS: ICD-10-CM

## 2020-10-28 DIAGNOSIS — Z00.00 ANNUAL PHYSICAL EXAM: ICD-10-CM

## 2020-10-28 DIAGNOSIS — F32.A ANXIETY AND DEPRESSION: ICD-10-CM

## 2020-10-28 DIAGNOSIS — F41.1 GENERALIZED ANXIETY DISORDER: ICD-10-CM

## 2020-10-28 DIAGNOSIS — Z13.6 SCREENING FOR HEART DISEASE: ICD-10-CM

## 2020-10-28 DIAGNOSIS — L21.9 SEBORRHEIC DERMATITIS: ICD-10-CM

## 2020-10-28 DIAGNOSIS — M25.50 POLYARTHRALGIA: ICD-10-CM

## 2020-10-29 RX ORDER — CLOTRIMAZOLE AND BETAMETHASONE DIPROPIONATE 10; .64 MG/G; MG/G
1 CREAM TOPICAL 2 TIMES DAILY PRN
Qty: 15 G | Refills: 1 | Status: SHIPPED | OUTPATIENT
Start: 2020-10-29 | End: 2020-12-29

## 2020-10-29 RX ORDER — LORAZEPAM 1 MG/1
TABLET ORAL
Qty: 60 TABLET | Refills: 0 | Status: SHIPPED | OUTPATIENT
Start: 2020-10-29 | End: 2020-12-29

## 2020-10-29 NOTE — TELEPHONE ENCOUNTER
Requested Prescriptions     Pending Prescriptions Disp Refills   • CLOTRIMAZOLE-BETAMETHASONE 1-0.05 % External Cream [Pharmacy Med Name: CLOTRIMAZOLE-BETAMETHASONE CRM] 15 g 1     Sig: APPLY 1 APPLICATION TOPICALLY 2 (TWO) TIMES DAILY AS NEEDED.    • Willy Wang

## 2020-11-18 ENCOUNTER — TELEPHONE (OUTPATIENT)
Dept: INTERNAL MEDICINE CLINIC | Facility: CLINIC | Age: 60
End: 2020-11-18

## 2020-11-18 ENCOUNTER — TELEMEDICINE (OUTPATIENT)
Dept: INTERNAL MEDICINE CLINIC | Facility: CLINIC | Age: 60
End: 2020-11-18
Payer: COMMERCIAL

## 2020-11-18 DIAGNOSIS — R63.5 ABNORMAL WEIGHT GAIN: ICD-10-CM

## 2020-11-18 DIAGNOSIS — E66.01 MORBID OBESITY (HCC): ICD-10-CM

## 2020-11-18 DIAGNOSIS — Z51.81 THERAPEUTIC DRUG MONITORING: ICD-10-CM

## 2020-11-18 DIAGNOSIS — E78.5 HYPERLIPIDEMIA, UNSPECIFIED HYPERLIPIDEMIA TYPE: Primary | ICD-10-CM

## 2020-11-18 PROCEDURE — 99214 OFFICE O/P EST MOD 30 MIN: CPT | Performed by: INTERNAL MEDICINE

## 2020-11-18 RX ORDER — LIRAGLUTIDE 6 MG/ML
INJECTION, SOLUTION SUBCUTANEOUS
Qty: 5 PEN | Refills: 1 | Status: CANCELLED | OUTPATIENT
Start: 2020-11-18 | End: 2021-01-15

## 2020-11-18 RX ORDER — PEN NEEDLE, DIABETIC 30 GX3/16"
1 NEEDLE, DISPOSABLE MISCELLANEOUS DAILY
Qty: 90 EACH | Refills: 0 | Status: SHIPPED | OUTPATIENT
Start: 2020-11-18 | End: 2021-02-16

## 2020-11-18 RX ORDER — PEN NEEDLE, DIABETIC 30 GX3/16"
1 NEEDLE, DISPOSABLE MISCELLANEOUS DAILY
Qty: 90 EACH | Refills: 0 | Status: CANCELLED | OUTPATIENT
Start: 2020-11-18 | End: 2021-02-16

## 2020-11-18 RX ORDER — LIRAGLUTIDE 6 MG/ML
INJECTION, SOLUTION SUBCUTANEOUS
Qty: 5 PEN | Refills: 1 | Status: SHIPPED | OUTPATIENT
Start: 2020-11-18 | End: 2021-01-15

## 2020-11-18 NOTE — TELEPHONE ENCOUNTER
Can you please start PA for saxenda   DX morbid obesity   Cannot tolerate stimulant( phentermine)  due to side effects

## 2020-11-18 NOTE — TELEPHONE ENCOUNTER
Medication: Murtis Roam -- not covered by insurance. Needs PA. Submitted prior auth through the medication order.

## 2020-11-18 NOTE — PROGRESS NOTES
Meño Mendes is a 61year old female.     This visit was conducted using telemedicine with live interactive video and audio   Patient verbally consents to conduct video visit   Patient understands and accepts financial responsibility for any deductible nodule: No  Family history of thyroid cancer: No  History of Pancreatic cancer or pancreatitis : No  History of kidney stone: No   History of glaucoma: No  History of heart disease: No  Seizure: No        No chest pain no sob      Current Outpatient Medica Oral Solution As directed. (Patient not taking: Reported on 1/22/2020 ) 1 Bottle 0   • famoTIDine 20 MG Oral Tab Take 1 tablet (20 mg total) by mouth nightly as needed for Heartburn.  90 tablet 3   • FLUTICASONE PROPIONATE 50 MCG/ACT Nasal Suspension SPRAY • Other (Other) Other         hashimoto   • Cancer Father    • Heart Disorder Maternal Grandfather 46        mi   • Heart Disease Maternal Grandfather 46        CAD   • Diabetes Maternal Grandfather    • Diabetes Maternal Grandmother    • Heart Disease M drug monitoring    Reviewed  Readiness for Lifestyle change: 10 /10, Interest in Medication: 10 /10, Surgery interest: 0/10.     Counseled on comprehensive weight loss plan including attention to nutrition, exercise and behavior/stress management for succes that the following visit was completed using two-way, real-time interactive audio and video communication.  This has been done in good cristian to provide continuity of care in the best interest of the provider-patient relationship, due to the ongoing public h

## 2020-11-19 ENCOUNTER — TELEPHONE (OUTPATIENT)
Dept: INTERNAL MEDICINE CLINIC | Facility: CLINIC | Age: 60
End: 2020-11-19

## 2020-11-19 DIAGNOSIS — R63.5 ABNORMAL WEIGHT GAIN: Primary | ICD-10-CM

## 2020-11-19 RX ORDER — NALTREXONE HYDROCHLORIDE AND BUPROPION HYDROCHLORIDE 8; 90 MG/1; MG/1
TABLET, EXTENDED RELEASE ORAL
Qty: 120 TABLET | Refills: 0 | Status: SHIPPED | OUTPATIENT
Start: 2020-11-19 | End: 2020-12-19

## 2020-11-19 NOTE — PATIENT INSTRUCTIONS
Chandrika Conroy    Welcome to [facility]. We are excited that you are committed to improving your health and have invited our practice to be part of your journey.  Our approach to the medical management of weight loss is similar to that of grzegorz oz of water per day, add fiber ( benefiber) to the water to increase fullness, overcome constipation    · Eat slowly    · Do not drink your calories ( no regular pop, juice, high calorie coffee drinks, limit alcohol) Also stay away from artificially sweete maintain weight loss. It is used with a reduced-calorie diet and exercise. How should I use this medicine? This medicine is for injection under the skin of your upper leg, stomach area, or upper arm.  You will be taught how to prepare and give this medici appetite  · diarrhea  · fatigue  · headache  · nausea  · pain, redness, or irritation at site where injected  · stomach upset  · stuffy or runny nose  What may interact with this medicine?     · insulin and other medicines for diabetes  What if I miss a dos pregnant  · breast-feeding  What should I watch for while using this medicine? Visit your doctor or health care professional for regular checks on your progress. This medicine is intended to be used in addition to a healthy diet and appropriate exercise.

## 2020-11-19 NOTE — TELEPHONE ENCOUNTER
Denied    Your PA request has been denied. Additional information will be provided in the denial communication.  (Message [phone])   Case ID: [id number]      [facility]  [facility]    [phone]    Electronic appeal:  Not supported   Your PA

## 2020-11-19 NOTE — TELEPHONE ENCOUNTER
Yes its in your mailbox. *Drug Not covered/plan exclusion- your request for coverage was denied because your prescription benefit plan does not cover the request medication.

## 2020-11-20 ENCOUNTER — NURSE ONLY (OUTPATIENT)
Dept: INTERNAL MEDICINE CLINIC | Facility: CLINIC | Age: 60
End: 2020-11-20
Payer: COMMERCIAL

## 2020-11-20 DIAGNOSIS — R63.5 ABNORMAL WEIGHT GAIN: ICD-10-CM

## 2020-11-20 PROCEDURE — 36415 COLL VENOUS BLD VENIPUNCTURE: CPT | Performed by: INTERNAL MEDICINE

## 2020-11-20 PROCEDURE — 83036 HEMOGLOBIN GLYCOSYLATED A1C: CPT | Performed by: INTERNAL MEDICINE

## 2020-11-20 PROCEDURE — 80061 LIPID PANEL: CPT | Performed by: INTERNAL MEDICINE

## 2020-11-20 PROCEDURE — 82607 VITAMIN B-12: CPT | Performed by: INTERNAL MEDICINE

## 2020-11-25 DIAGNOSIS — E88.81 METABOLIC SYNDROME: Primary | ICD-10-CM

## 2020-11-25 RX ORDER — PEN NEEDLE, DIABETIC 30 GX3/16"
1 NEEDLE, DISPOSABLE MISCELLANEOUS
Qty: 30 EACH | Refills: 0 | Status: SHIPPED | OUTPATIENT
Start: 2020-11-25 | End: 2021-12-16

## 2020-11-25 RX ORDER — SEMAGLUTIDE 1.34 MG/ML
0.25 INJECTION, SOLUTION SUBCUTANEOUS
Qty: 1 PEN | Refills: 0 | Status: SHIPPED | OUTPATIENT
Start: 2020-11-25 | End: 2020-12-23

## 2020-11-27 RX ORDER — ERGOCALCIFEROL 1.25 MG/1
50000 CAPSULE ORAL WEEKLY
Qty: 12 CAPSULE | Refills: 1 | Status: SHIPPED | OUTPATIENT
Start: 2020-11-27 | End: 2021-07-09

## 2020-12-18 ENCOUNTER — TELEPHONE (OUTPATIENT)
Dept: INTERNAL MEDICINE CLINIC | Facility: CLINIC | Age: 60
End: 2020-12-18

## 2020-12-18 NOTE — TELEPHONE ENCOUNTER
Informed patient that doctor is out and there are no available visit spots, but she could go to a [facility] or [facility]. She has been intensely sheltering in place and would rather not do that. She is going to their [facility] in [place] for the weekend.   I

## 2020-12-18 NOTE — TELEPHONE ENCOUNTER
Pt has a dry nose that is semi sore and slightly stuffy, she is also having some pain behind her ear lobe. She's wondering if something can be called in? Offered a visit with Dr Shayna Luna tomorrow and she states she will be out of town in [place].

## 2020-12-29 DIAGNOSIS — M25.50 POLYARTHRALGIA: ICD-10-CM

## 2020-12-29 DIAGNOSIS — Z90.711 S/P PARTIAL HYSTERECTOMY: ICD-10-CM

## 2020-12-29 DIAGNOSIS — Z00.00 ANNUAL PHYSICAL EXAM: ICD-10-CM

## 2020-12-29 DIAGNOSIS — Z00.00 ROUTINE GENERAL MEDICAL EXAMINATION AT A HEALTH CARE FACILITY: ICD-10-CM

## 2020-12-29 DIAGNOSIS — F32.A ANXIETY AND DEPRESSION: ICD-10-CM

## 2020-12-29 DIAGNOSIS — J01.90 ACUTE BACTERIAL RHINOSINUSITIS: ICD-10-CM

## 2020-12-29 DIAGNOSIS — K21.9 GASTROESOPHAGEAL REFLUX DISEASE: ICD-10-CM

## 2020-12-29 DIAGNOSIS — F41.1 GENERALIZED ANXIETY DISORDER: ICD-10-CM

## 2020-12-29 DIAGNOSIS — L21.9 SEBORRHEIC DERMATITIS: ICD-10-CM

## 2020-12-29 DIAGNOSIS — B35.9 TINEA: ICD-10-CM

## 2020-12-29 DIAGNOSIS — Z13.6 SCREENING FOR HEART DISEASE: ICD-10-CM

## 2020-12-29 DIAGNOSIS — B96.89 ACUTE BACTERIAL RHINOSINUSITIS: ICD-10-CM

## 2020-12-29 DIAGNOSIS — F41.9 ANXIETY AND DEPRESSION: ICD-10-CM

## 2020-12-29 RX ORDER — LORAZEPAM 1 MG/1
TABLET ORAL
Qty: 60 TABLET | Refills: 0 | Status: SHIPPED | OUTPATIENT
Start: 2020-12-29

## 2020-12-29 RX ORDER — CLOTRIMAZOLE AND BETAMETHASONE DIPROPIONATE 10; .64 MG/G; MG/G
1 CREAM TOPICAL 2 TIMES DAILY PRN
Qty: 15 G | Refills: 1 | Status: SHIPPED | OUTPATIENT
Start: 2020-12-29 | End: 2021-07-09

## 2021-01-27 ENCOUNTER — IMMUNIZATION (OUTPATIENT)
Dept: LAB | Facility: HOSPITAL | Age: 61
End: 2021-01-27
Attending: EMERGENCY MEDICINE
Payer: COMMERCIAL

## 2021-01-27 DIAGNOSIS — Z23 NEED FOR VACCINATION: Primary | ICD-10-CM

## 2021-01-27 PROCEDURE — 0011A SARSCOV2 VAC 100MCG/0.5ML IM: CPT

## 2021-01-27 RX ORDER — FAMOTIDINE 20 MG/1
TABLET ORAL
Qty: 90 TABLET | Refills: 3 | Status: SHIPPED | OUTPATIENT
Start: 2021-01-27 | End: 2021-10-21

## 2021-01-27 NOTE — TELEPHONE ENCOUNTER
Requested Prescriptions     Pending Prescriptions Disp Refills   • FAMOTIDINE 20 MG Oral Tab [Pharmacy Med Name: FAMOTIDINE 20 MG TABLET] 90 tablet 3     Sig: TAKE 1 TABLET BY MOUTH EVERY DAY AS NEEDED FOR HEARTBURN     LR: 1/14/2020         Qty:90       R

## 2021-02-24 ENCOUNTER — IMMUNIZATION (OUTPATIENT)
Dept: LAB | Facility: HOSPITAL | Age: 61
End: 2021-02-24
Attending: EMERGENCY MEDICINE
Payer: COMMERCIAL

## 2021-02-24 DIAGNOSIS — Z23 NEED FOR VACCINATION: Primary | ICD-10-CM

## 2021-02-24 PROCEDURE — 0012A SARSCOV2 VAC 100MCG/0.5ML IM: CPT

## 2021-04-27 DIAGNOSIS — F32.A ANXIETY AND DEPRESSION: ICD-10-CM

## 2021-04-27 DIAGNOSIS — J01.90 ACUTE BACTERIAL RHINOSINUSITIS: ICD-10-CM

## 2021-04-27 DIAGNOSIS — F41.1 GENERALIZED ANXIETY DISORDER: ICD-10-CM

## 2021-04-27 DIAGNOSIS — M25.50 POLYARTHRALGIA: ICD-10-CM

## 2021-04-27 DIAGNOSIS — K21.9 GASTROESOPHAGEAL REFLUX DISEASE: ICD-10-CM

## 2021-04-27 DIAGNOSIS — Z00.00 ANNUAL PHYSICAL EXAM: ICD-10-CM

## 2021-04-27 DIAGNOSIS — Z13.6 SCREENING FOR HEART DISEASE: ICD-10-CM

## 2021-04-27 DIAGNOSIS — B35.9 TINEA: ICD-10-CM

## 2021-04-27 DIAGNOSIS — Z00.00 ROUTINE GENERAL MEDICAL EXAMINATION AT A HEALTH CARE FACILITY: ICD-10-CM

## 2021-04-27 DIAGNOSIS — L21.9 SEBORRHEIC DERMATITIS: ICD-10-CM

## 2021-04-27 DIAGNOSIS — F41.9 ANXIETY AND DEPRESSION: ICD-10-CM

## 2021-04-27 DIAGNOSIS — Z90.711 S/P PARTIAL HYSTERECTOMY: ICD-10-CM

## 2021-04-27 DIAGNOSIS — B96.89 ACUTE BACTERIAL RHINOSINUSITIS: ICD-10-CM

## 2021-04-29 RX ORDER — CLOTRIMAZOLE AND BETAMETHASONE DIPROPIONATE 10; .64 MG/G; MG/G
1 CREAM TOPICAL 2 TIMES DAILY PRN
Qty: 15 G | Refills: 1 | OUTPATIENT
Start: 2021-04-29 | End: 2021-05-09

## 2021-04-29 RX ORDER — LORAZEPAM 1 MG/1
TABLET ORAL
Qty: 60 TABLET | Refills: 0 | OUTPATIENT
Start: 2021-04-29

## 2021-05-11 ENCOUNTER — TELEPHONE (OUTPATIENT)
Dept: GASTROENTEROLOGY | Facility: CLINIC | Age: 61
End: 2021-05-11

## 2021-05-11 RX ORDER — ERGOCALCIFEROL 1.25 MG/1
50000 CAPSULE ORAL WEEKLY
Qty: 12 CAPSULE | Refills: 1 | OUTPATIENT
Start: 2021-05-11 | End: 2021-07-28

## 2021-05-11 NOTE — TELEPHONE ENCOUNTER
Patient outreach message received. Patient due for repeat EGD in August 2021. Recall reminder letter mailed out to patient. \"1 year EGD recall placed in patient outreach. Due 08/11/2021 per Dr. Madison Peoples. \"

## 2021-05-11 NOTE — TELEPHONE ENCOUNTER
Dr. Gisela Houser patient to schedule EGD recall per 1 year. Patient states with her work schedule is unable to take off whenever she wants and gets penalized for taking days off.      Is not interested at this time to schedule procedure and w

## 2021-05-11 NOTE — TELEPHONE ENCOUNTER
Date of procedure: 08/11/20     Procedure: EGD w/biopsies & Colonoscopy w/biopsies and cold biopsy and cold snare polypectomy     Pre-operative diagnosis: elevated calprotectin, bloating and reflux     Post-operative diagnosis: gastric erythema, biopsy to None     EGD findings:       1. Esophagus: The squamocolumnar junction was noted at 40 cm and appeared irregular with irritation and salmon colored mucosa concerning for esophagitis or barretts. The GE junction was noted at 40 cm from the incisors.  No sign intestinal metaplasia  · Negative for dysplasia  · Keratin AE1/AE3 stain shows no evidence of invasive carcinoma  · Diff-quik stain (with reactive positive control) is negative for Helicobacter pylori-like organisms     B.  Distal esophagus; biopsy:  · Luis Miguel

## 2021-05-20 DIAGNOSIS — E88.810 METABOLIC SYNDROME: ICD-10-CM

## 2021-05-20 RX ORDER — SEMAGLUTIDE 1.34 MG/ML
0.25 INJECTION, SOLUTION SUBCUTANEOUS
OUTPATIENT
Start: 2021-05-20 | End: 2021-06-17

## 2021-05-20 NOTE — TELEPHONE ENCOUNTER
Diabetic Medication Protocol [id number]/20/2021 09:08 AM   HgBA1C procedure resulted in past 6 months Protocol Details    Last HgBA1C < 7.5     Microalbumin procedure in past 12 months or taking ACE/ARB     Appointment in past 6 or next 3 months      Last OV 11/18/20   No Future appt

## 2021-06-23 ENCOUNTER — OFFICE VISIT (OUTPATIENT)
Dept: INTERNAL MEDICINE CLINIC | Facility: CLINIC | Age: 61
End: 2021-06-23
Payer: COMMERCIAL

## 2021-06-23 VITALS
DIASTOLIC BLOOD PRESSURE: 80 MMHG | OXYGEN SATURATION: 98 % | HEART RATE: 82 BPM | SYSTOLIC BLOOD PRESSURE: 122 MMHG | HEIGHT: 64 IN | BODY MASS INDEX: 42.96 KG/M2 | WEIGHT: 251.63 LBS

## 2021-06-23 DIAGNOSIS — R63.5 ABNORMAL WEIGHT GAIN: ICD-10-CM

## 2021-06-23 DIAGNOSIS — E66.01 MORBID OBESITY (HCC): ICD-10-CM

## 2021-06-23 DIAGNOSIS — Z51.81 THERAPEUTIC DRUG MONITORING: ICD-10-CM

## 2021-06-23 DIAGNOSIS — E78.5 HYPERLIPIDEMIA, UNSPECIFIED HYPERLIPIDEMIA TYPE: Primary | ICD-10-CM

## 2021-06-23 PROCEDURE — 3074F SYST BP LT 130 MM HG: CPT | Performed by: INTERNAL MEDICINE

## 2021-06-23 PROCEDURE — 3008F BODY MASS INDEX DOCD: CPT | Performed by: INTERNAL MEDICINE

## 2021-06-23 PROCEDURE — 99214 OFFICE O/P EST MOD 30 MIN: CPT | Performed by: INTERNAL MEDICINE

## 2021-06-23 PROCEDURE — 3079F DIAST BP 80-89 MM HG: CPT | Performed by: INTERNAL MEDICINE

## 2021-06-23 NOTE — PATIENT INSTRUCTIONS
Agustin Henderson    Welcome to [facility]. We are excited that you are committed to improving your health and have invited our practice to be part of your journey.  Our approach to the medical management of weight loss is similar to that of grzegorz oz of water per day, add fiber ( benefiber) to the water to increase fullness, overcome constipation    · Eat slowly    · Do not drink your calories ( no regular pop, juice, high calorie coffee drinks, limit alcohol) Also stay away from artificially sweete

## 2021-06-24 ENCOUNTER — PATIENT MESSAGE (OUTPATIENT)
Dept: INTERNAL MEDICINE CLINIC | Facility: CLINIC | Age: 61
End: 2021-06-24

## 2021-06-24 DIAGNOSIS — F41.9 ANXIETY AND DEPRESSION: ICD-10-CM

## 2021-06-24 DIAGNOSIS — Z00.00 UNSPECIFIED EXAMINATION: ICD-10-CM

## 2021-06-24 DIAGNOSIS — F32.A ANXIETY AND DEPRESSION: ICD-10-CM

## 2021-06-24 DIAGNOSIS — R03.0 ELEVATED BLOOD PRESSURE READING: Primary | ICD-10-CM

## 2021-07-08 DIAGNOSIS — Z90.711 S/P PARTIAL HYSTERECTOMY: ICD-10-CM

## 2021-07-08 DIAGNOSIS — F41.9 ANXIETY AND DEPRESSION: ICD-10-CM

## 2021-07-08 DIAGNOSIS — B35.9 TINEA: ICD-10-CM

## 2021-07-08 DIAGNOSIS — Z00.00 ANNUAL PHYSICAL EXAM: ICD-10-CM

## 2021-07-08 DIAGNOSIS — L21.9 SEBORRHEIC DERMATITIS: ICD-10-CM

## 2021-07-08 DIAGNOSIS — K21.9 GASTROESOPHAGEAL REFLUX DISEASE: ICD-10-CM

## 2021-07-08 DIAGNOSIS — F32.A ANXIETY AND DEPRESSION: ICD-10-CM

## 2021-07-09 RX ORDER — ERGOCALCIFEROL 1.25 MG/1
50000 CAPSULE ORAL WEEKLY
Qty: 12 CAPSULE | Refills: 1 | Status: SHIPPED | OUTPATIENT
Start: 2021-07-09 | End: 2021-09-25

## 2021-07-09 RX ORDER — CLOTRIMAZOLE AND BETAMETHASONE DIPROPIONATE 10; .64 MG/G; MG/G
1 CREAM TOPICAL 2 TIMES DAILY PRN
Qty: 15 G | Refills: 1 | Status: SHIPPED | OUTPATIENT
Start: 2021-07-09 | End: 2021-07-19

## 2021-07-23 ENCOUNTER — NURSE ONLY (OUTPATIENT)
Dept: INTERNAL MEDICINE CLINIC | Facility: CLINIC | Age: 61
End: 2021-07-23
Payer: COMMERCIAL

## 2021-07-23 DIAGNOSIS — Z00.00 UNSPECIFIED EXAMINATION: ICD-10-CM

## 2021-07-23 DIAGNOSIS — R03.0 ELEVATED BLOOD PRESSURE READING: ICD-10-CM

## 2021-07-23 DIAGNOSIS — F41.9 ANXIETY AND DEPRESSION: ICD-10-CM

## 2021-07-23 DIAGNOSIS — F32.A ANXIETY AND DEPRESSION: ICD-10-CM

## 2021-07-23 LAB
ALBUMIN SERPL-MCNC: 3.9 G/DL (ref 3.4–5)
ALBUMIN/GLOB SERPL: 1 {RATIO} (ref 1–2)
ALP LIVER SERPL-CCNC: 91 U/L
ALT SERPL-CCNC: 31 U/L
ANION GAP SERPL CALC-SCNC: 6 MMOL/L (ref 0–18)
AST SERPL-CCNC: 22 U/L (ref 15–37)
BASOPHILS # BLD AUTO: 0.03 X10(3) UL (ref 0–0.2)
BASOPHILS NFR BLD AUTO: 0.8 %
BILIRUB SERPL-MCNC: 0.8 MG/DL (ref 0.1–2)
BUN BLD-MCNC: 12 MG/DL (ref 7–18)
BUN/CREAT SERPL: 14.1 (ref 10–20)
CALCIUM BLD-MCNC: 9.3 MG/DL (ref 8.5–10.1)
CHLORIDE SERPL-SCNC: 108 MMOL/L (ref 98–112)
CHOLEST SMN-MCNC: 195 MG/DL (ref ?–200)
CO2 SERPL-SCNC: 25 MMOL/L (ref 21–32)
CREAT BLD-MCNC: 0.85 MG/DL
DEPRECATED RDW RBC AUTO: 45.7 FL (ref 35.1–46.3)
EOSINOPHIL # BLD AUTO: 0.12 X10(3) UL (ref 0–0.7)
EOSINOPHIL NFR BLD AUTO: 3.2 %
ERYTHROCYTE [DISTWIDTH] IN BLOOD BY AUTOMATED COUNT: 13.4 % (ref 11–15)
EST. AVERAGE GLUCOSE BLD GHB EST-MCNC: 114 MG/DL (ref 68–126)
GLOBULIN PLAS-MCNC: 3.9 G/DL (ref 2.8–4.4)
GLUCOSE BLD-MCNC: 60 MG/DL (ref 70–99)
HBA1C MFR BLD HPLC: 5.6 % (ref ?–5.7)
HCT VFR BLD AUTO: 44.6 %
HDLC SERPL-MCNC: 57 MG/DL (ref 40–59)
HGB BLD-MCNC: 14.2 G/DL
IMM GRANULOCYTES # BLD AUTO: 0.01 X10(3) UL (ref 0–1)
IMM GRANULOCYTES NFR BLD: 0.3 %
LDLC SERPL CALC-MCNC: 124 MG/DL (ref ?–100)
LYMPHOCYTES # BLD AUTO: 1.26 X10(3) UL (ref 1–4)
LYMPHOCYTES NFR BLD AUTO: 34 %
M PROTEIN MFR SERPL ELPH: 7.8 G/DL (ref 6.4–8.2)
MCH RBC QN AUTO: 29.5 PG (ref 26–34)
MCHC RBC AUTO-ENTMCNC: 31.8 G/DL (ref 31–37)
MCV RBC AUTO: 92.5 FL
MONOCYTES # BLD AUTO: 0.48 X10(3) UL (ref 0.1–1)
MONOCYTES NFR BLD AUTO: 12.9 %
NEUTROPHILS # BLD AUTO: 1.81 X10 (3) UL (ref 1.5–7.7)
NEUTROPHILS # BLD AUTO: 1.81 X10(3) UL (ref 1.5–7.7)
NEUTROPHILS NFR BLD AUTO: 48.8 %
NONHDLC SERPL-MCNC: 138 MG/DL (ref ?–130)
OSMOLALITY SERPL CALC.SUM OF ELEC: 286 MOSM/KG (ref 275–295)
PATIENT FASTING Y/N/NP: YES
PATIENT FASTING Y/N/NP: YES
PLATELET # BLD AUTO: 173 10(3)UL (ref 150–450)
POTASSIUM SERPL-SCNC: 4.2 MMOL/L (ref 3.5–5.1)
RBC # BLD AUTO: 4.82 X10(6)UL
SODIUM SERPL-SCNC: 139 MMOL/L (ref 136–145)
TRIGL SERPL-MCNC: 74 MG/DL (ref 30–149)
TSI SER-ACNC: 0.58 MIU/ML (ref 0.36–3.74)
VLDLC SERPL CALC-MCNC: 13 MG/DL (ref 0–30)
WBC # BLD AUTO: 3.7 X10(3) UL (ref 4–11)

## 2021-07-23 PROCEDURE — 80050 GENERAL HEALTH PANEL: CPT | Performed by: INTERNAL MEDICINE

## 2021-07-23 PROCEDURE — 80061 LIPID PANEL: CPT | Performed by: INTERNAL MEDICINE

## 2021-07-23 PROCEDURE — 83036 HEMOGLOBIN GLYCOSYLATED A1C: CPT | Performed by: INTERNAL MEDICINE

## 2021-07-26 DIAGNOSIS — R79.89 ABNORMAL CBC: Primary | ICD-10-CM

## 2021-07-27 NOTE — TELEPHONE ENCOUNTER
Contacted Rashida to inquire if she is ready to schedule her repeat egd (1 year recall). Patient is at work and cannot talk right now. Voiced she will call back once she is able to discuss further.

## 2021-08-03 ENCOUNTER — OFFICE VISIT (OUTPATIENT)
Dept: INTERNAL MEDICINE CLINIC | Facility: CLINIC | Age: 61
End: 2021-08-03
Payer: COMMERCIAL

## 2021-08-03 VITALS
OXYGEN SATURATION: 98 % | WEIGHT: 242.81 LBS | HEIGHT: 64 IN | DIASTOLIC BLOOD PRESSURE: 86 MMHG | SYSTOLIC BLOOD PRESSURE: 122 MMHG | BODY MASS INDEX: 41.45 KG/M2 | HEART RATE: 84 BPM

## 2021-08-03 DIAGNOSIS — E66.01 MORBID OBESITY (HCC): ICD-10-CM

## 2021-08-03 DIAGNOSIS — Z00.00 ANNUAL PHYSICAL EXAM: Primary | ICD-10-CM

## 2021-08-03 DIAGNOSIS — Z13.6 SCREENING FOR HEART DISEASE: ICD-10-CM

## 2021-08-03 DIAGNOSIS — Z12.31 SCREENING MAMMOGRAM, ENCOUNTER FOR: ICD-10-CM

## 2021-08-03 DIAGNOSIS — L25.9 CONTACT DERMATITIS, UNSPECIFIED CONTACT DERMATITIS TYPE, UNSPECIFIED TRIGGER: ICD-10-CM

## 2021-08-03 PROCEDURE — 3079F DIAST BP 80-89 MM HG: CPT | Performed by: INTERNAL MEDICINE

## 2021-08-03 PROCEDURE — 3074F SYST BP LT 130 MM HG: CPT | Performed by: INTERNAL MEDICINE

## 2021-08-03 PROCEDURE — 99396 PREV VISIT EST AGE 40-64: CPT | Performed by: INTERNAL MEDICINE

## 2021-08-03 PROCEDURE — 3008F BODY MASS INDEX DOCD: CPT | Performed by: INTERNAL MEDICINE

## 2021-08-03 RX ORDER — CETIRIZINE HYDROCHLORIDE 10 MG/1
10 TABLET ORAL EVERY MORNING
COMMUNITY
Start: 2021-02-18

## 2021-08-03 RX ORDER — MELOXICAM 15 MG/1
15 TABLET ORAL DAILY
COMMUNITY
Start: 2021-05-13

## 2021-08-03 RX ORDER — SEMAGLUTIDE 0.5 MG/.5ML
0.5 INJECTION, SOLUTION SUBCUTANEOUS WEEKLY
Qty: 2 ML | Refills: 0 | Status: SHIPPED | OUTPATIENT
Start: 2021-08-03 | End: 2021-08-25

## 2021-08-03 NOTE — PATIENT INSTRUCTIONS
Build Muscle & Lose Fat  The great fat vs muscle diagram below paints a clear picture of why it’s so important for you to build muscle in order to lose fat.   Maybe Sonia Albright wondered about muscle vs fat and why you need to build muscle to lose fat, look slim your abilities to perform many lifestyle activities. 4. Prevent injury. Strength training can build stronger muscles and more limber, flexible joints, which play a crucial role in preventing injury. 5. Increase bone density.  Weight bearing activities imp

## 2021-08-03 NOTE — PROGRESS NOTES
Alnodra Castellanos is a 64year old female.     Chief complaint:weight loss follow up , medication refill, therapeutic drug monitoring  Annual physical exam     HPI:   Arron Adorno here for follow up on weight loss   Wt Readings from Last 12 Encounters:  08/03/ TWICE A DAY FOR 7 DAYS 12 tablet 1   • Meloxicam 7.5 MG Oral Tab TAKE 2 TAB DAILY X 4 DAYS, THEN 1 TAB DAILY UNTIL FINISHED  1   • cetirizine 10 MG Oral Tab Take 10 mg by mouth every morning. • Meloxicam 15 MG Oral Tab Take 15 mg by mouth daily.      • tablet (150 mg total) by mouth every 3 (three) days. (Patient not taking: Reported on 1/22/2020 ) 3 tablet 0   • Zolpidem Tartrate 10 MG Oral Tab Take 1 tablet (10 mg total) by mouth nightly as needed for Sleep.  (Patient not taking: Reported on 6/23/2021 ) medical examination at a health care facility     Elevated blood pressure reading     Class 2 obesity with serious comorbidity and body mass index (BMI) of 38.0 to 38.9 in adult     Anxiety and depression     S/P partial hysterectomy              REVIEW OF Tab; Take 15 mg by mouth daily.  - PROMETHAZINE-CODEINE OR  - semaglutide-weight management (WEGOVY) 0.5 MG/0.5ML Subcutaneous Solution Auto-injector; Inject 0.5 mL (0.5 mg total) into the skin once a week for 4 doses.   Dispense: 2 mL; Refill: 0  - hydroco If persistent to refer to derm     - cetirizine 10 MG Oral Tab; Take 10 mg by mouth every morning.  - Meloxicam 15 MG Oral Tab;  Take 15 mg by mouth daily.  - PROMETHAZINE-CODEINE OR  - semaglutide-weight management (WEGOVY) 0.5 MG/0.5ML Subcutaneous Solu

## 2021-08-08 ENCOUNTER — PATIENT MESSAGE (OUTPATIENT)
Dept: INTERNAL MEDICINE CLINIC | Facility: CLINIC | Age: 61
End: 2021-08-08

## 2021-08-10 RX ORDER — ONDANSETRON 4 MG/1
4 TABLET, ORALLY DISINTEGRATING ORAL EVERY 8 HOURS PRN
Qty: 30 TABLET | Refills: 0 | Status: SHIPPED | OUTPATIENT
Start: 2021-08-10 | End: 2021-10-21

## 2021-08-10 NOTE — TELEPHONE ENCOUNTER
Miguel Angel Vega, [address] 8/9/2021 3:04 PM CDT      ----- Message -----  From: Roro Bey  Sent: 8/8/2021 10:09 AM CDT  To: Kanika Molina Clinical Staff  Subject: Prescription Question     Hi  I have my wengovy   I'll start Wednesday.  Can you prescribe the anti

## 2021-08-25 ENCOUNTER — NURSE ONLY (OUTPATIENT)
Dept: INTERNAL MEDICINE CLINIC | Facility: CLINIC | Age: 61
End: 2021-08-25
Payer: COMMERCIAL

## 2021-08-25 ENCOUNTER — TELEPHONE (OUTPATIENT)
Dept: INTERNAL MEDICINE CLINIC | Facility: CLINIC | Age: 61
End: 2021-08-25

## 2021-08-25 DIAGNOSIS — N39.0 URINARY TRACT INFECTION WITHOUT HEMATURIA, SITE UNSPECIFIED: Primary | ICD-10-CM

## 2021-08-25 LAB
APPEARANCE: CLEAR
BILIRUBIN: NEGATIVE
GLUCOSE (URINE DIPSTICK): NEGATIVE MG/DL
KETONES (URINE DIPSTICK): NEGATIVE MG/DL
MULTISTIX LOT#: ABNORMAL NUMERIC
NITRITE, URINE: NEGATIVE
PH, URINE: 6 (ref 4.5–8)
PROTEIN (URINE DIPSTICK): NEGATIVE MG/DL
SPECIFIC GRAVITY: 1.01 (ref 1–1.03)
URINE-COLOR: YELLOW
UROBILINOGEN,SEMI-QN: 0.2 MG/DL (ref 0–1.9)

## 2021-08-25 PROCEDURE — 87086 URINE CULTURE/COLONY COUNT: CPT | Performed by: INTERNAL MEDICINE

## 2021-08-25 PROCEDURE — 87088 URINE BACTERIA CULTURE: CPT | Performed by: INTERNAL MEDICINE

## 2021-08-25 PROCEDURE — 87186 SC STD MICRODIL/AGAR DIL: CPT | Performed by: INTERNAL MEDICINE

## 2021-08-25 PROCEDURE — 81002 URINALYSIS NONAUTO W/O SCOPE: CPT | Performed by: INTERNAL MEDICINE

## 2021-08-25 RX ORDER — SULFAMETHOXAZOLE AND TRIMETHOPRIM 800; 160 MG/1; MG/1
1 TABLET ORAL 2 TIMES DAILY
Qty: 10 TABLET | Refills: 0 | Status: SHIPPED | OUTPATIENT
Start: 2021-08-25 | End: 2021-08-30

## 2021-08-25 NOTE — TELEPHONE ENCOUNTER
Thinks she has a UTI. Symptoms: urgency; lower abd; not cloudy or malodorous, no other symptoms. Has had UTI's in the past and is convinced this is the beginning of one. Can she just come in and drop off a urine sample?   Trying desperately to avoid any

## 2021-08-30 ENCOUNTER — TELEPHONE (OUTPATIENT)
Dept: INTERNAL MEDICINE CLINIC | Facility: CLINIC | Age: 61
End: 2021-08-30

## 2021-08-30 DIAGNOSIS — Z00.00 ANNUAL PHYSICAL EXAM: ICD-10-CM

## 2021-08-30 DIAGNOSIS — Z13.6 SCREENING FOR HEART DISEASE: ICD-10-CM

## 2021-08-30 DIAGNOSIS — E66.01 MORBID OBESITY (HCC): ICD-10-CM

## 2021-08-30 DIAGNOSIS — L25.9 CONTACT DERMATITIS, UNSPECIFIED CONTACT DERMATITIS TYPE, UNSPECIFIED TRIGGER: ICD-10-CM

## 2021-08-30 DIAGNOSIS — Z12.31 SCREENING MAMMOGRAM, ENCOUNTER FOR: ICD-10-CM

## 2021-08-30 NOTE — TELEPHONE ENCOUNTER
Pt needs a refill on her Wegovy. Pt states that her pharmacy is having a hard time getting the medication. She is wondering if it is ok to stay on the same dosage?

## 2021-08-31 RX ORDER — SEMAGLUTIDE 0.5 MG/.5ML
0.5 INJECTION, SOLUTION SUBCUTANEOUS WEEKLY
Qty: 2 ML | Refills: 0 | Status: SHIPPED | OUTPATIENT
Start: 2021-08-31 | End: 2021-09-22

## 2021-08-31 RX ORDER — SEMAGLUTIDE 1 MG/.5ML
1 INJECTION, SOLUTION SUBCUTANEOUS WEEKLY
Qty: 2 ML | Refills: 0 | Status: SHIPPED | OUTPATIENT
Start: 2021-08-31 | End: 2021-09-22

## 2021-08-31 NOTE — TELEPHONE ENCOUNTER
Allen Kong can you please clarify with patient does she need to stay on wegovy or ozempic ?   Thanks,   MT

## 2021-08-31 NOTE — TELEPHONE ENCOUNTER
Spoke to pharmacy     He said she's supposed to be taking wegovy 1 mg. Gave pharmacist the verbal okay.

## 2021-08-31 NOTE — TELEPHONE ENCOUNTER
She's taking the [facility] only    Please send the 0.5 ML to [facility] pharmacy she's on her last shot

## 2021-08-31 NOTE — TELEPHONE ENCOUNTER
Pharmacy states they are confused. Pt should be on 1mg of Wegovy but  sent over another script for . 5mg

## 2021-09-01 RX ORDER — SEMAGLUTIDE 0.5 MG/.5ML
0.5 INJECTION, SOLUTION SUBCUTANEOUS WEEKLY
Qty: 2 ML | Refills: 0 | OUTPATIENT
Start: 2021-09-01 | End: 2021-09-23

## 2021-09-15 ENCOUNTER — OFFICE VISIT (OUTPATIENT)
Dept: INTERNAL MEDICINE CLINIC | Facility: CLINIC | Age: 61
End: 2021-09-15
Payer: COMMERCIAL

## 2021-09-15 VITALS
SYSTOLIC BLOOD PRESSURE: 126 MMHG | BODY MASS INDEX: 40.4 KG/M2 | HEIGHT: 64 IN | OXYGEN SATURATION: 98 % | WEIGHT: 236.63 LBS | HEART RATE: 88 BPM | DIASTOLIC BLOOD PRESSURE: 80 MMHG

## 2021-09-15 DIAGNOSIS — Z51.81 THERAPEUTIC DRUG MONITORING: Primary | ICD-10-CM

## 2021-09-15 DIAGNOSIS — E66.01 MORBID OBESITY (HCC): ICD-10-CM

## 2021-09-15 DIAGNOSIS — R79.89 ABNORMAL CBC: ICD-10-CM

## 2021-09-15 LAB
BASOPHILS # BLD AUTO: 0.05 X10(3) UL (ref 0–0.2)
BASOPHILS NFR BLD AUTO: 0.7 %
DEPRECATED RDW RBC AUTO: 45.5 FL (ref 35.1–46.3)
EOSINOPHIL # BLD AUTO: 0.17 X10(3) UL (ref 0–0.7)
EOSINOPHIL NFR BLD AUTO: 2.2 %
ERYTHROCYTE [DISTWIDTH] IN BLOOD BY AUTOMATED COUNT: 13.4 % (ref 11–15)
HCT VFR BLD AUTO: 42.3 %
HGB BLD-MCNC: 13.9 G/DL
IMM GRANULOCYTES # BLD AUTO: 0.01 X10(3) UL (ref 0–1)
IMM GRANULOCYTES NFR BLD: 0.1 %
LYMPHOCYTES # BLD AUTO: 2.25 X10(3) UL (ref 1–4)
LYMPHOCYTES NFR BLD AUTO: 29.5 %
MCH RBC QN AUTO: 30.1 PG (ref 26–34)
MCHC RBC AUTO-ENTMCNC: 32.9 G/DL (ref 31–37)
MCV RBC AUTO: 91.6 FL
MONOCYTES # BLD AUTO: 0.58 X10(3) UL (ref 0.1–1)
MONOCYTES NFR BLD AUTO: 7.6 %
NEUTROPHILS # BLD AUTO: 4.57 X10 (3) UL (ref 1.5–7.7)
NEUTROPHILS # BLD AUTO: 4.57 X10(3) UL (ref 1.5–7.7)
NEUTROPHILS NFR BLD AUTO: 59.9 %
PLATELET # BLD AUTO: 223 10(3)UL (ref 150–450)
RBC # BLD AUTO: 4.62 X10(6)UL
WBC # BLD AUTO: 7.6 X10(3) UL (ref 4–11)

## 2021-09-15 PROCEDURE — 99213 OFFICE O/P EST LOW 20 MIN: CPT | Performed by: INTERNAL MEDICINE

## 2021-09-15 PROCEDURE — 85025 COMPLETE CBC W/AUTO DIFF WBC: CPT | Performed by: INTERNAL MEDICINE

## 2021-09-15 PROCEDURE — 3074F SYST BP LT 130 MM HG: CPT | Performed by: INTERNAL MEDICINE

## 2021-09-15 PROCEDURE — 3008F BODY MASS INDEX DOCD: CPT | Performed by: INTERNAL MEDICINE

## 2021-09-15 PROCEDURE — 3079F DIAST BP 80-89 MM HG: CPT | Performed by: INTERNAL MEDICINE

## 2021-09-15 RX ORDER — SEMAGLUTIDE 1.7 MG/.75ML
1.7 INJECTION, SOLUTION SUBCUTANEOUS WEEKLY
Qty: 3 ML | Refills: 0 | Status: SHIPPED | OUTPATIENT
Start: 2021-09-15 | End: 2021-10-07

## 2021-09-15 RX ORDER — CLOTRIMAZOLE AND BETAMETHASONE DIPROPIONATE 10; .64 MG/G; MG/G
1 CREAM TOPICAL 2 TIMES DAILY PRN
COMMUNITY
Start: 2021-08-10 | End: 2021-11-02

## 2021-09-15 NOTE — PROGRESS NOTES
Obinna Decker is a 64year old female.     Chief complaint:weight loss follow up , medication refill, therapeutic drug monitoring    HPI:   Jessica Rise here for follow up on weight loss   Wt Readings from Last 12 Encounters:  09/15/21 : 236 lb 9.6 oz (107 mouth every 8 (eight) hours as needed for Nausea. 30 tablet 0   • cetirizine 10 MG Oral Tab Take 10 mg by mouth every morning. • Meloxicam 15 MG Oral Tab Take 15 mg by mouth daily.      • PROMETHAZINE-CODEINE OR      • ERGOCALCIFEROL 1.25 MG (87133 UT) (Patient not taking: Reported on 6/23/2021 ) 1 kit 0   • Fluticasone Furoate (FLONASE SENSIMIST) 27.5 MCG/SPRAY Nasal Suspension 2 sprays, one at a time to each nostril x 15 days.  1 each 0   • Na Sulfate-K Sulfate-Mg Sulf (SUPREP BOWEL PREP KIT) 17.5-3.13- Smokeless tobacco: Never Used    Alcohol use: No      Alcohol/week: 0.0 standard drinks      Comment: Rarely    Drug use: No       Family History   Problem Relation Age of Onset   • Hypertension Mother    • Other (Other) Mother         thyroid   • Other ( management (WEGOVY)         1.7 MG/0.75ML Subcutaneous Solution         Auto-injector, CBC WITH DIFFERENTIAL WITH         PLATELET    (U97.41) Morbid obesity (HCC)  Plan: clotrimazole-betamethasone 1-0.05 % External         Cream, semaglutide-weight manage

## 2021-10-21 ENCOUNTER — PATIENT MESSAGE (OUTPATIENT)
Dept: INTERNAL MEDICINE CLINIC | Facility: CLINIC | Age: 61
End: 2021-10-21

## 2021-10-22 RX ORDER — MELOXICAM 15 MG/1
15 TABLET ORAL
Qty: 20 TABLET | Refills: 1 | Status: SHIPPED | OUTPATIENT
Start: 2021-10-22

## 2021-10-22 NOTE — TELEPHONE ENCOUNTER
Dr. Wisam Payton--    Patient requesting refill for famotidine 20 mg/daily prn. Please review and sign pended orders if appropriate.      LOV: Colonoscopy/egd performed 08/11/2020   LR: 01/27/2021   Future Appts: None     FAMOTIDINE 20 MG Oral Tab 90 tablet 3

## 2021-10-22 NOTE — TELEPHONE ENCOUNTER
Sukhwinder Diallo [place] 10/22/2021 8:02 AM CDT      ----- Message -----  From: Dayron Araya  Sent: 10/21/2021 4:15 PM CDT  To: Kanika Molina Clinical Staff  Subject: melixcam     hi can you please renew my prescription for the meloxicam ... also . [facility]  weight loss

## 2021-10-23 RX ORDER — FAMOTIDINE 20 MG/1
20 TABLET ORAL DAILY PRN
Qty: 90 TABLET | Refills: 3 | Status: SHIPPED | OUTPATIENT
Start: 2021-10-23

## 2021-10-25 RX ORDER — ONDANSETRON 4 MG/1
4 TABLET, ORALLY DISINTEGRATING ORAL EVERY 8 HOURS PRN
Qty: 30 TABLET | Refills: 1 | Status: SHIPPED | OUTPATIENT
Start: 2021-10-25 | End: 2021-12-20

## 2021-10-28 DIAGNOSIS — Z51.81 THERAPEUTIC DRUG MONITORING: ICD-10-CM

## 2021-10-28 DIAGNOSIS — R79.89 ABNORMAL CBC: ICD-10-CM

## 2021-10-28 DIAGNOSIS — E66.01 MORBID OBESITY (HCC): ICD-10-CM

## 2021-11-02 RX ORDER — CLOTRIMAZOLE AND BETAMETHASONE DIPROPIONATE 10; .64 MG/G; MG/G
1 CREAM TOPICAL 2 TIMES DAILY PRN
Qty: 15 G | Refills: 1 | Status: SHIPPED | OUTPATIENT
Start: 2021-11-02

## 2021-11-15 ENCOUNTER — OFFICE VISIT (OUTPATIENT)
Dept: INTERNAL MEDICINE CLINIC | Facility: CLINIC | Age: 61
End: 2021-11-15
Payer: COMMERCIAL

## 2021-11-15 VITALS
OXYGEN SATURATION: 99 % | DIASTOLIC BLOOD PRESSURE: 82 MMHG | SYSTOLIC BLOOD PRESSURE: 132 MMHG | BODY MASS INDEX: 38.52 KG/M2 | HEIGHT: 64 IN | HEART RATE: 76 BPM | WEIGHT: 225.63 LBS

## 2021-11-15 DIAGNOSIS — E66.01 MORBID OBESITY (HCC): ICD-10-CM

## 2021-11-15 DIAGNOSIS — Z23 NEED FOR VACCINATION: ICD-10-CM

## 2021-11-15 DIAGNOSIS — Z51.81 THERAPEUTIC DRUG MONITORING: Primary | ICD-10-CM

## 2021-11-15 PROCEDURE — 90471 IMMUNIZATION ADMIN: CPT | Performed by: INTERNAL MEDICINE

## 2021-11-15 PROCEDURE — 3075F SYST BP GE 130 - 139MM HG: CPT | Performed by: INTERNAL MEDICINE

## 2021-11-15 PROCEDURE — 90750 HZV VACC RECOMBINANT IM: CPT | Performed by: INTERNAL MEDICINE

## 2021-11-15 PROCEDURE — 3079F DIAST BP 80-89 MM HG: CPT | Performed by: INTERNAL MEDICINE

## 2021-11-15 PROCEDURE — 3008F BODY MASS INDEX DOCD: CPT | Performed by: INTERNAL MEDICINE

## 2021-11-15 PROCEDURE — 99213 OFFICE O/P EST LOW 20 MIN: CPT | Performed by: INTERNAL MEDICINE

## 2021-11-15 RX ORDER — SEMAGLUTIDE 2.4 MG/.75ML
2.4 INJECTION, SOLUTION SUBCUTANEOUS WEEKLY
Qty: 9 ML | Refills: 0 | Status: SHIPPED | OUTPATIENT
Start: 2021-11-15 | End: 2022-02-01

## 2021-11-16 ENCOUNTER — PATIENT MESSAGE (OUTPATIENT)
Dept: INTERNAL MEDICINE CLINIC | Facility: CLINIC | Age: 61
End: 2021-11-16

## 2021-11-18 NOTE — TELEPHONE ENCOUNTER
Genaro Pearson[facility] 11/17/2021 8:21 AM CST      ----- Message -----  From: Robert Cobos  Sent: 11/16/2021 4:09 PM CST  To: Kanika Molina Clinical Staff  Subject: melixcam     Hi they only sent the wegovy for 30 days   Can you send the ozempic to [facility] so I have

## 2021-11-30 ENCOUNTER — TELEPHONE (OUTPATIENT)
Dept: INTERNAL MEDICINE CLINIC | Facility: CLINIC | Age: 61
End: 2021-11-30

## 2021-11-30 NOTE — TELEPHONE ENCOUNTER
Received prior authorization for [facility] [employer]     Wegovy     Faxed all paperwork today

## 2021-12-16 ENCOUNTER — OFFICE VISIT (OUTPATIENT)
Dept: FAMILY MEDICINE CLINIC | Facility: CLINIC | Age: 61
End: 2021-12-16
Payer: COMMERCIAL

## 2021-12-16 VITALS
WEIGHT: 216 LBS | HEIGHT: 64 IN | DIASTOLIC BLOOD PRESSURE: 84 MMHG | BODY MASS INDEX: 36.88 KG/M2 | HEART RATE: 101 BPM | SYSTOLIC BLOOD PRESSURE: 145 MMHG

## 2021-12-16 DIAGNOSIS — R03.0 ELEVATED BLOOD PRESSURE READING: ICD-10-CM

## 2021-12-16 DIAGNOSIS — S39.012A STRAIN OF LUMBAR REGION, INITIAL ENCOUNTER: Primary | ICD-10-CM

## 2021-12-16 PROCEDURE — 3077F SYST BP >= 140 MM HG: CPT | Performed by: NURSE PRACTITIONER

## 2021-12-16 PROCEDURE — 99203 OFFICE O/P NEW LOW 30 MIN: CPT | Performed by: NURSE PRACTITIONER

## 2021-12-16 PROCEDURE — 3079F DIAST BP 80-89 MM HG: CPT | Performed by: NURSE PRACTITIONER

## 2021-12-16 PROCEDURE — 3008F BODY MASS INDEX DOCD: CPT | Performed by: NURSE PRACTITIONER

## 2021-12-16 RX ORDER — ERGOCALCIFEROL 1.25 MG/1
CAPSULE ORAL
COMMUNITY
Start: 2021-11-18 | End: 2022-02-07

## 2021-12-16 RX ORDER — IBUPROFEN 600 MG/1
600 TABLET ORAL EVERY 6 HOURS PRN
Qty: 40 TABLET | Refills: 0 | Status: SHIPPED | OUTPATIENT
Start: 2021-12-16 | End: 2022-01-15

## 2021-12-16 NOTE — PROGRESS NOTES
HPI  Pt presents for low back pain for the past couple of days. Symptoms are better today. Pt normally sees Dr Laura Willingham from [facility]. Has been using heating pad and seat warmers. No known injury. Slight discomfort when moving in certain positions.    I Hypertension Mother    • Other (Other) Mother         thyroid   • Other (Other) Other         hashimoto   • Cancer Father    • Heart Disorder Maternal Grandfather 46        mi   • Heart Disease Maternal Grandfather 46        CAD   • Diabetes Maternal [place] • famotidine 20 MG Oral Tab Take 1 tablet (20 mg total) by mouth daily as needed. 90 tablet 3   • cetirizine 10 MG Oral Tab Take 10 mg by mouth every morning.      • LORAZEPAM 1 MG Oral Tab TAKE 1 TABLET BY MOUTH TWICE A DAY 60 tablet 0   • ergocalciferol Effort: Pulmonary effort is normal. No respiratory distress. Breath sounds: Normal breath sounds. Abdominal:      General: There is no distension. Palpations: Abdomen is soft. Tenderness: There is no abdominal tenderness.    Musculoskeletal

## 2021-12-16 NOTE — PATIENT INSTRUCTIONS
Self-Care for Low Back Pain    Most people have low back pain now and then. In many cases, it isn’t serious and self-care can help. Sometimes low back pain can be a sign of a bigger problem.  Call your healthcare provider if your pain returns often or get constant. · You have pain, tingling, or numbness in your leg. · You feel pain in a new area of your back. · You notice that the pain isn’t decreasing after more than a week.   Johnny last reviewed this educational content on 3/1/2018  © 6407-8327 The S

## 2021-12-16 NOTE — ASSESSMENT & PLAN NOTE
Ice 20 min 4-6 times per day  Gentle stretching  Ibuprofen 600 mg I po tid prn w food  Please call if symptoms worsen or are not resolving.

## 2021-12-20 RX ORDER — ONDANSETRON 4 MG/1
TABLET, ORALLY DISINTEGRATING ORAL
Qty: 30 TABLET | Refills: 1 | Status: SHIPPED | OUTPATIENT
Start: 2021-12-20

## 2021-12-20 NOTE — TELEPHONE ENCOUNTER
Requested Prescriptions     Pending Prescriptions Disp Refills   • ONDANSETRON 4 MG Oral Tablet Dispersible [Pharmacy Med Name: ONDANSETRON ODT 4 MG TABLET] 30 tablet 1     Sig: TAKE 1 TABLET BY MOUTH EVERY 8 HOURS AS NEEDED FOR NAUSEA     Last office visi

## 2021-12-29 ENCOUNTER — TELEPHONE (OUTPATIENT)
Dept: INTERNAL MEDICINE CLINIC | Facility: CLINIC | Age: 61
End: 2021-12-29

## 2021-12-29 NOTE — TELEPHONE ENCOUNTER
Patient actually taking Ozempic now. Per their website, it can be used when not refrigerated up to 5 days; then it needs to be thrown away. Informed her she can use the Ozempic until February 2, 2022.     Suggested she call the [facility] customer s

## 2021-12-29 NOTE — TELEPHONE ENCOUNTER
Patient realized that the generic of Wegovy she left out on the counter for weeks and didn't refrigerate the prescription. Today she is supposed to take the injection.     Patient called her pharmacy and they stated it was okay but everything that the pa

## 2021-12-30 NOTE — TELEPHONE ENCOUNTER
Correction:  The directions are 56 days okay, not 5. Patient WAS told 56 days during conversation yesterday, as per 's guidelines.

## 2022-01-18 ENCOUNTER — TELEMEDICINE (OUTPATIENT)
Dept: INTERNAL MEDICINE CLINIC | Facility: CLINIC | Age: 62
End: 2022-01-18

## 2022-01-18 DIAGNOSIS — E66.1 CLASS 2 DRUG-INDUCED OBESITY IN ADULT, UNSPECIFIED BMI, UNSPECIFIED WHETHER SERIOUS COMORBIDITY PRESENT: ICD-10-CM

## 2022-01-18 DIAGNOSIS — Z51.81 THERAPEUTIC DRUG MONITORING: Primary | ICD-10-CM

## 2022-01-18 DIAGNOSIS — F41.9 ANXIETY: ICD-10-CM

## 2022-01-18 PROCEDURE — 99214 OFFICE O/P EST MOD 30 MIN: CPT | Performed by: INTERNAL MEDICINE

## 2022-01-18 RX ORDER — BUPROPION HYDROCHLORIDE 150 MG/1
150 TABLET ORAL DAILY
Qty: 30 TABLET | Refills: 2 | Status: SHIPPED | OUTPATIENT
Start: 2022-01-18

## 2022-01-18 NOTE — PROGRESS NOTES
This visit was conducted using telemedicine with live interactive video and audio   Patient verbally consents to conduct video visit   Patient understands and accepts financial responsibility for any deductible, co-insurance and/or co-pays associated with Temp 97.4   Blood pressure is 128/79   o2 99%   Pulse 72     Current Outpatient Medications   Medication Sig Dispense Refill   • ONDANSETRON 4 MG Oral Tablet Dispersible TAKE 1 TABLET BY MOUTH EVERY 8 HOURS AS NEEDED FOR NAUSEA 30 tablet 1   • ergocalcif elidia.    • ELECTROCARDIOGRAM, COMPLETE  09/05/2013    SCANNED TO MEDIA TAB - 09/05/2013   • HYSTERECTOMY  2008     partial, No BSO, no abNL paps.     • REPAIR ROTATOR CUFF,ACUTE  2004        Social History:  Social History    Tobacco Use      Smoking statu drug-induced obesity in adult, unspecified BMI, unspecified whether serious comorbidity present    (F41.9) Anxiety    Plan:  · Patient has lost ( not sure ) # since [address] has lost a total weight loss of 20 lbs# since first weight loss consult.   Labs r Dereje Alvarez MD,   Diplomate of the American Board of Internal Medicine  Diplomate of the American Board of Obesity Medicine

## 2022-01-19 NOTE — PATIENT INSTRUCTIONS
Get In Touch With Your Appetite- Hunger Cues  There are many signals that tell us it's time to eat (other than a rumbling stomach): television ads, social events, smells from the food court, and the candy bowl at the office.  These factors in the environmen else  **   Hunger-Satiety Rating Scale    Full - 10        10 = Stuffed to the point of feeling sick   9 = Very uncomfortably full, need to loosen your belt    8 = Uncomfortably full, feel stuffed    7 = Very full, feel as if you have overeaten    6 = Comf reaction to a medicine. Symptoms of an allergic reaction can include trouble breathing, skin rash, itching, swelling, or severe dizziness. This medicine is associated with an increased risk for seizures.  Please ask your doctor whether you may be at risk f appetite  · constipation  · dizziness  · drowsiness or sedation  · dry mouth  · headaches  · high blood pressure  · itching  · muscle pain  · nausea  · skin irritation such as redness, itching, rash, or burning  · problems with sexual functions or desire

## 2022-02-07 RX ORDER — ERGOCALCIFEROL 1.25 MG/1
CAPSULE ORAL
Qty: 12 CAPSULE | Refills: 1 | Status: SHIPPED | OUTPATIENT
Start: 2022-02-07

## 2022-03-07 ENCOUNTER — TELEPHONE (OUTPATIENT)
Dept: INTERNAL MEDICINE CLINIC | Facility: CLINIC | Age: 62
End: 2022-03-07

## 2022-03-07 NOTE — TELEPHONE ENCOUNTER
PRIOR AUTHORIZATION     Medication Name:   Semaglutide, 1 MG/DOSE, 4 MG/3ML Subcutaneous Solution Pen-injector      Indication per provider:   Ddx : obesity   Didn't loose weight on ozempic   Cannot use phentermine due to anxiety       Received Request: 01/18/2022    Prior authorization Started: 03/07/2022    Outcome: PENDING REQUEST - INITIAL BENEFIT REVIEW     Was patient notified?  Yes

## 2022-03-10 ENCOUNTER — TELEPHONE (OUTPATIENT)
Dept: INTERNAL MEDICINE CLINIC | Facility: CLINIC | Age: 62
End: 2022-03-10

## 2022-03-10 NOTE — TELEPHONE ENCOUNTER
Patient has a weight loss appointment with Dr. Yunior Mendez on 3/15. She said she is supposed to complete blood work before her appointment. Can Dr. Yunior Mendez please put the order in, so patient can schedule nurse appointment.

## 2022-03-11 ENCOUNTER — NURSE ONLY (OUTPATIENT)
Dept: INTERNAL MEDICINE CLINIC | Facility: CLINIC | Age: 62
End: 2022-03-11
Payer: COMMERCIAL

## 2022-03-11 ENCOUNTER — TELEPHONE (OUTPATIENT)
Dept: INTERNAL MEDICINE CLINIC | Facility: CLINIC | Age: 62
End: 2022-03-11

## 2022-03-11 DIAGNOSIS — Z51.81 THERAPEUTIC DRUG MONITORING: ICD-10-CM

## 2022-03-11 DIAGNOSIS — Z00.00 ROUTINE GENERAL MEDICAL EXAMINATION AT A HEALTH CARE FACILITY: ICD-10-CM

## 2022-03-11 LAB
ALBUMIN SERPL-MCNC: 4 G/DL (ref 3.4–5)
ALBUMIN/GLOB SERPL: 1.1 {RATIO} (ref 1–2)
ALP LIVER SERPL-CCNC: 85 U/L
ALT SERPL-CCNC: 29 U/L
ANION GAP SERPL CALC-SCNC: 7 MMOL/L (ref 0–18)
AST SERPL-CCNC: 13 U/L (ref 15–37)
BASOPHILS # BLD AUTO: 0.04 X10(3) UL (ref 0–0.2)
BASOPHILS NFR BLD AUTO: 0.8 %
BILIRUB SERPL-MCNC: 0.6 MG/DL (ref 0.1–2)
BUN BLD-MCNC: 14 MG/DL (ref 7–18)
BUN/CREAT SERPL: 15.4 (ref 10–20)
CALCIUM BLD-MCNC: 9.4 MG/DL (ref 8.5–10.1)
CHLORIDE SERPL-SCNC: 108 MMOL/L (ref 98–112)
CHOLEST SERPL-MCNC: 210 MG/DL (ref ?–200)
CO2 SERPL-SCNC: 26 MMOL/L (ref 21–32)
CREAT BLD-MCNC: 0.91 MG/DL
DEPRECATED RDW RBC AUTO: 46.2 FL (ref 35.1–46.3)
EOSINOPHIL NFR BLD AUTO: 2 %
ERYTHROCYTE [DISTWIDTH] IN BLOOD BY AUTOMATED COUNT: 13.6 % (ref 11–15)
FASTING PATIENT LIPID ANSWER: YES
FASTING STATUS PATIENT QL REPORTED: YES
GLOBULIN PLAS-MCNC: 3.8 G/DL (ref 2.8–4.4)
GLUCOSE BLD-MCNC: 78 MG/DL (ref 70–99)
HCT VFR BLD AUTO: 42.7 %
HDLC SERPL-MCNC: 66 MG/DL (ref 40–59)
HGB BLD-MCNC: 13.9 G/DL
IMM GRANULOCYTES # BLD AUTO: 0.03 X10(3) UL (ref 0–1)
IMM GRANULOCYTES NFR BLD: 0.6 %
LDLC SERPL CALC-MCNC: 130 MG/DL (ref ?–100)
LYMPHOCYTES # BLD AUTO: 1.48 X10(3) UL (ref 1–4)
LYMPHOCYTES NFR BLD AUTO: 29 %
MCH RBC QN AUTO: 30.2 PG (ref 26–34)
MCHC RBC AUTO-ENTMCNC: 32.6 G/DL (ref 31–37)
MCV RBC AUTO: 92.8 FL
MONOCYTES # BLD AUTO: 0.46 X10(3) UL (ref 0.1–1)
MONOCYTES NFR BLD AUTO: 9 %
NEUTROPHILS # BLD AUTO: 3 X10 (3) UL (ref 1.5–7.7)
NEUTROPHILS # BLD AUTO: 3 X10(3) UL (ref 1.5–7.7)
NEUTROPHILS NFR BLD AUTO: 58.6 %
NONHDLC SERPL-MCNC: 144 MG/DL (ref ?–130)
OSMOLALITY SERPL CALC.SUM OF ELEC: 291 MOSM/KG (ref 275–295)
PLATELET # BLD AUTO: 212 10(3)UL (ref 150–450)
POTASSIUM SERPL-SCNC: 4 MMOL/L (ref 3.5–5.1)
PROT SERPL-MCNC: 7.8 G/DL (ref 6.4–8.2)
RBC # BLD AUTO: 4.6 X10(6)UL
SODIUM SERPL-SCNC: 141 MMOL/L (ref 136–145)
TRIGL SERPL-MCNC: 76 MG/DL (ref 30–149)
VLDLC SERPL CALC-MCNC: 14 MG/DL (ref 0–30)
WBC # BLD AUTO: 5.1 X10(3) UL (ref 4–11)

## 2022-03-11 PROCEDURE — 85025 COMPLETE CBC W/AUTO DIFF WBC: CPT | Performed by: INTERNAL MEDICINE

## 2022-03-11 PROCEDURE — 80061 LIPID PANEL: CPT | Performed by: INTERNAL MEDICINE

## 2022-03-11 PROCEDURE — 80053 COMPREHEN METABOLIC PANEL: CPT | Performed by: INTERNAL MEDICINE

## 2022-03-11 NOTE — TELEPHONE ENCOUNTER
Pt was in today for nurse visit. Edward CMP, lipid and CBC per orders in system. When pt was leaving she asked about VitD and iron testing that was discussed during visit. Informed I will send msg to Dr. [facility] but unfortunately VItD is another colored tube and will need another draw. Asked if iron could be added.    Informed I would send Beth Ruiz

## 2022-03-15 ENCOUNTER — OFFICE VISIT (OUTPATIENT)
Dept: INTERNAL MEDICINE CLINIC | Facility: CLINIC | Age: 62
End: 2022-03-15
Payer: COMMERCIAL

## 2022-03-15 VITALS
HEIGHT: 64 IN | OXYGEN SATURATION: 99 % | HEART RATE: 87 BPM | BODY MASS INDEX: 35.31 KG/M2 | DIASTOLIC BLOOD PRESSURE: 86 MMHG | SYSTOLIC BLOOD PRESSURE: 120 MMHG | WEIGHT: 206.81 LBS

## 2022-03-15 DIAGNOSIS — Z12.31 VISIT FOR SCREENING MAMMOGRAM: ICD-10-CM

## 2022-03-15 DIAGNOSIS — Z51.81 THERAPEUTIC DRUG MONITORING: Primary | ICD-10-CM

## 2022-03-15 DIAGNOSIS — F32.A ANXIETY AND DEPRESSION: ICD-10-CM

## 2022-03-15 DIAGNOSIS — L65.9 HAIR LOSS: ICD-10-CM

## 2022-03-15 DIAGNOSIS — E66.01 MORBID OBESITY (HCC): ICD-10-CM

## 2022-03-15 DIAGNOSIS — F41.9 ANXIETY AND DEPRESSION: ICD-10-CM

## 2022-03-15 PROCEDURE — 3008F BODY MASS INDEX DOCD: CPT | Performed by: INTERNAL MEDICINE

## 2022-03-15 PROCEDURE — 99214 OFFICE O/P EST MOD 30 MIN: CPT | Performed by: INTERNAL MEDICINE

## 2022-03-15 PROCEDURE — 3074F SYST BP LT 130 MM HG: CPT | Performed by: INTERNAL MEDICINE

## 2022-03-15 PROCEDURE — 3079F DIAST BP 80-89 MM HG: CPT | Performed by: INTERNAL MEDICINE

## 2022-03-15 RX ORDER — BUPROPION HYDROCHLORIDE 300 MG/1
300 TABLET ORAL DAILY
Qty: 90 TABLET | Refills: 1 | Status: SHIPPED | OUTPATIENT
Start: 2022-03-15 | End: 2022-06-13

## 2022-03-30 ENCOUNTER — PATIENT MESSAGE (OUTPATIENT)
Dept: INTERNAL MEDICINE CLINIC | Facility: CLINIC | Age: 62
End: 2022-03-30

## 2022-03-30 RX ORDER — VALACYCLOVIR HYDROCHLORIDE 1 G/1
2000 TABLET, FILM COATED ORAL EVERY 12 HOURS SCHEDULED
Qty: 30 TABLET | Refills: 0 | Status: SHIPPED | OUTPATIENT
Start: 2022-03-30 | End: 2022-04-01

## 2022-03-30 NOTE — TELEPHONE ENCOUNTER
Lacho Police, [facility] 3/30/2022 1:01 PM CDT      ----- Message -----  From: Khadijah Babb  Sent: 3/30/2022 12:40 PM CDT  To: Kanika Molina Clinical Staff  Subject: Booster     Hi   Was wondering on what your recommendation was on getting another booster and also if I could get a prescription for valtex for my cold sore   Thanks   Radha Galvan

## 2022-05-16 DIAGNOSIS — E66.1 CLASS 2 DRUG-INDUCED OBESITY IN ADULT, UNSPECIFIED BMI, UNSPECIFIED WHETHER SERIOUS COMORBIDITY PRESENT: ICD-10-CM

## 2022-05-16 DIAGNOSIS — F41.9 ANXIETY: ICD-10-CM

## 2022-05-16 DIAGNOSIS — Z51.81 THERAPEUTIC DRUG MONITORING: ICD-10-CM

## 2022-05-17 LAB
% SATURATION: 21 % (CALC) (ref 16–45)
FERRITIN: 173 NG/ML (ref 16–288)
IRON BINDING CAPACITY: 266 MCG/DL (CALC) (ref 250–450)
IRON, TOTAL: 55 MCG/DL (ref 45–160)
TSH W/REFLEX TO FT4: 0.85 MIU/L (ref 0.4–4.5)
VITAMIN D, 25-OH, TOTAL: 42 NG/ML (ref 30–100)

## 2022-05-18 ENCOUNTER — TELEPHONE (OUTPATIENT)
Dept: INTERNAL MEDICINE CLINIC | Facility: CLINIC | Age: 62
End: 2022-05-18

## 2022-05-18 RX ORDER — BUPROPION HYDROCHLORIDE 150 MG/1
150 TABLET ORAL DAILY
Qty: 30 TABLET | Refills: 2 | Status: SHIPPED | OUTPATIENT
Start: 2022-05-18

## 2022-07-14 ENCOUNTER — LAB ENCOUNTER (OUTPATIENT)
Dept: LAB | Age: 62
End: 2022-07-14
Attending: INTERNAL MEDICINE
Payer: COMMERCIAL

## 2022-07-14 DIAGNOSIS — F32.A ANXIETY AND DEPRESSION: ICD-10-CM

## 2022-07-14 DIAGNOSIS — F41.9 ANXIETY AND DEPRESSION: ICD-10-CM

## 2022-07-14 DIAGNOSIS — Z11.52 ENCOUNTER FOR SCREENING FOR COVID-19: ICD-10-CM

## 2022-07-14 DIAGNOSIS — E66.01 MORBID OBESITY (HCC): ICD-10-CM

## 2022-07-14 LAB — SARS-COV-2 RNA RESP QL NAA+PROBE: NOT DETECTED

## 2022-07-22 DIAGNOSIS — F41.9 ANXIETY AND DEPRESSION: ICD-10-CM

## 2022-07-22 DIAGNOSIS — L65.9 HAIR LOSS: ICD-10-CM

## 2022-07-22 DIAGNOSIS — E66.01 MORBID OBESITY (HCC): ICD-10-CM

## 2022-07-22 DIAGNOSIS — Z51.81 THERAPEUTIC DRUG MONITORING: ICD-10-CM

## 2022-07-22 DIAGNOSIS — F32.A ANXIETY AND DEPRESSION: ICD-10-CM

## 2022-07-25 RX ORDER — LORAZEPAM 1 MG/1
TABLET ORAL
Qty: 60 TABLET | Refills: 0 | Status: SHIPPED | OUTPATIENT
Start: 2022-07-25

## 2022-07-25 NOTE — TELEPHONE ENCOUNTER
Last OV:5-16-22  Last refill:5-16-22    Next Appt:    With [address] Leonard Cody MD)  08/08/2022 at 7:00 AM

## 2022-08-08 ENCOUNTER — OFFICE VISIT (OUTPATIENT)
Dept: INTERNAL MEDICINE CLINIC | Facility: CLINIC | Age: 62
End: 2022-08-08
Payer: COMMERCIAL

## 2022-08-08 VITALS
HEART RATE: 76 BPM | HEIGHT: 64 IN | WEIGHT: 188.63 LBS | OXYGEN SATURATION: 98 % | DIASTOLIC BLOOD PRESSURE: 70 MMHG | BODY MASS INDEX: 32.2 KG/M2 | SYSTOLIC BLOOD PRESSURE: 120 MMHG

## 2022-08-08 DIAGNOSIS — E66.01 MORBID OBESITY (HCC): ICD-10-CM

## 2022-08-08 DIAGNOSIS — Z90.710 S/P HYSTERECTOMY: ICD-10-CM

## 2022-08-08 DIAGNOSIS — Z51.81 THERAPEUTIC DRUG MONITORING: ICD-10-CM

## 2022-08-08 DIAGNOSIS — Z00.00 ANNUAL PHYSICAL EXAM: Primary | ICD-10-CM

## 2022-08-08 DIAGNOSIS — F32.A ANXIETY AND DEPRESSION: ICD-10-CM

## 2022-08-08 DIAGNOSIS — Z12.4 SCREENING FOR CERVICAL CANCER: ICD-10-CM

## 2022-08-08 DIAGNOSIS — F41.9 ANXIETY AND DEPRESSION: ICD-10-CM

## 2022-08-08 DIAGNOSIS — L98.9 SCALP LESION: ICD-10-CM

## 2022-08-08 PROCEDURE — 3074F SYST BP LT 130 MM HG: CPT | Performed by: INTERNAL MEDICINE

## 2022-08-08 PROCEDURE — 3008F BODY MASS INDEX DOCD: CPT | Performed by: INTERNAL MEDICINE

## 2022-08-08 PROCEDURE — 90750 HZV VACC RECOMBINANT IM: CPT | Performed by: INTERNAL MEDICINE

## 2022-08-08 PROCEDURE — 3078F DIAST BP <80 MM HG: CPT | Performed by: INTERNAL MEDICINE

## 2022-08-08 PROCEDURE — 90471 IMMUNIZATION ADMIN: CPT | Performed by: INTERNAL MEDICINE

## 2022-08-08 PROCEDURE — 99396 PREV VISIT EST AGE 40-64: CPT | Performed by: INTERNAL MEDICINE

## 2022-08-08 RX ORDER — KETOCONAZOLE 20 MG/ML
1 SHAMPOO TOPICAL
Qty: 100 ML | Refills: 0 | Status: SHIPPED | OUTPATIENT
Start: 2022-08-08 | End: 2022-09-07

## 2022-08-08 RX ORDER — BUPROPION HYDROCHLORIDE 150 MG/1
150 TABLET ORAL DAILY
Qty: 90 TABLET | Refills: 3 | Status: SHIPPED | OUTPATIENT
Start: 2022-08-08 | End: 2022-11-06

## 2022-08-08 RX ORDER — CLOBETASOL PROPIONATE 0.46 MG/ML
1 SOLUTION TOPICAL 2 TIMES DAILY
Qty: 25 ML | Refills: 1 | Status: SHIPPED | OUTPATIENT
Start: 2022-08-08

## 2022-08-08 RX ORDER — COVID-19 ANTIGEN TEST
KIT MISCELLANEOUS
COMMUNITY
Start: 2022-07-14

## 2022-08-11 RX ORDER — ERGOCALCIFEROL 1.25 MG/1
CAPSULE ORAL
Qty: 12 CAPSULE | Refills: 1 | Status: SHIPPED | OUTPATIENT
Start: 2022-08-11

## 2022-09-08 DIAGNOSIS — F32.A ANXIETY AND DEPRESSION: ICD-10-CM

## 2022-09-08 DIAGNOSIS — Z51.81 THERAPEUTIC DRUG MONITORING: ICD-10-CM

## 2022-09-08 DIAGNOSIS — E66.01 MORBID OBESITY (HCC): ICD-10-CM

## 2022-09-08 DIAGNOSIS — L65.9 HAIR LOSS: ICD-10-CM

## 2022-09-08 DIAGNOSIS — F41.9 ANXIETY AND DEPRESSION: ICD-10-CM

## 2022-09-09 RX ORDER — SEMAGLUTIDE 1.34 MG/ML
1 INJECTION, SOLUTION SUBCUTANEOUS WEEKLY
Qty: 9 ML | Refills: 0 | Status: SHIPPED | OUTPATIENT
Start: 2022-09-09

## 2022-09-09 RX ORDER — LORAZEPAM 1 MG/1
TABLET ORAL
Qty: 60 TABLET | Refills: 0 | Status: SHIPPED | OUTPATIENT
Start: 2022-09-09

## 2022-10-13 RX ORDER — SEMAGLUTIDE 2.68 MG/ML
2 INJECTION, SOLUTION SUBCUTANEOUS WEEKLY
Qty: 9 ML | Refills: 0 | Status: SHIPPED | OUTPATIENT
Start: 2022-10-13 | End: 2023-01-11

## 2022-11-07 ENCOUNTER — OFFICE VISIT (OUTPATIENT)
Dept: INTERNAL MEDICINE CLINIC | Facility: CLINIC | Age: 62
End: 2022-11-07
Payer: COMMERCIAL

## 2022-11-07 VITALS
HEIGHT: 64 IN | SYSTOLIC BLOOD PRESSURE: 120 MMHG | HEART RATE: 64 BPM | DIASTOLIC BLOOD PRESSURE: 86 MMHG | WEIGHT: 183.38 LBS | OXYGEN SATURATION: 96 % | BODY MASS INDEX: 31.31 KG/M2

## 2022-11-07 DIAGNOSIS — E66.9 OBESITY, CLASS I, BMI 30-34.9: ICD-10-CM

## 2022-11-07 DIAGNOSIS — F32.A ANXIETY AND DEPRESSION: ICD-10-CM

## 2022-11-07 DIAGNOSIS — Z51.81 THERAPEUTIC DRUG MONITORING: Primary | ICD-10-CM

## 2022-11-07 DIAGNOSIS — F41.9 ANXIETY AND DEPRESSION: ICD-10-CM

## 2022-11-07 PROCEDURE — 3074F SYST BP LT 130 MM HG: CPT | Performed by: INTERNAL MEDICINE

## 2022-11-07 PROCEDURE — 3079F DIAST BP 80-89 MM HG: CPT | Performed by: INTERNAL MEDICINE

## 2022-11-07 PROCEDURE — 99214 OFFICE O/P EST MOD 30 MIN: CPT | Performed by: INTERNAL MEDICINE

## 2022-11-07 PROCEDURE — 3008F BODY MASS INDEX DOCD: CPT | Performed by: INTERNAL MEDICINE

## 2022-11-07 RX ORDER — TIRZEPATIDE 5 MG/.5ML
5 INJECTION, SOLUTION SUBCUTANEOUS WEEKLY
Qty: 6 ML | Refills: 0 | Status: SHIPPED | OUTPATIENT
Start: 2022-11-07

## 2022-11-10 ENCOUNTER — TELEPHONE (OUTPATIENT)
Dept: INTERNAL MEDICINE CLINIC | Facility: CLINIC | Age: 62
End: 2022-11-10

## 2022-11-10 NOTE — TELEPHONE ENCOUNTER
Pt called and explained that her [employer] ins told her that Dr. Rosie Horan will not be in their network. Pt was wondering if our Dr's are notified when this change happens with an insurance?

## 2022-12-17 ENCOUNTER — TELEMEDICINE (OUTPATIENT)
Dept: INTERNAL MEDICINE CLINIC | Facility: CLINIC | Age: 62
End: 2022-12-17
Payer: COMMERCIAL

## 2022-12-17 DIAGNOSIS — F32.A ANXIETY AND DEPRESSION: ICD-10-CM

## 2022-12-17 DIAGNOSIS — F41.9 ANXIETY AND DEPRESSION: ICD-10-CM

## 2022-12-17 DIAGNOSIS — E66.01 MORBID OBESITY (HCC): ICD-10-CM

## 2022-12-17 DIAGNOSIS — Z51.81 THERAPEUTIC DRUG MONITORING: Primary | ICD-10-CM

## 2022-12-17 PROCEDURE — 99213 OFFICE O/P EST LOW 20 MIN: CPT | Performed by: INTERNAL MEDICINE

## 2022-12-17 RX ORDER — TIRZEPATIDE 5 MG/.5ML
5 INJECTION, SOLUTION SUBCUTANEOUS WEEKLY
Qty: 6 ML | Refills: 0 | Status: SHIPPED | OUTPATIENT
Start: 2022-12-17

## 2022-12-17 RX ORDER — LORAZEPAM 1 MG/1
1 TABLET ORAL 2 TIMES DAILY PRN
Qty: 60 TABLET | Refills: 0 | Status: SHIPPED | OUTPATIENT
Start: 2022-12-17

## 2022-12-17 RX ORDER — LORAZEPAM 1 MG/1
1 TABLET ORAL 2 TIMES DAILY PRN
Qty: 60 TABLET | Refills: 1 | Status: SHIPPED
Start: 2022-12-17 | End: 2022-12-17

## 2022-12-17 RX ORDER — LORAZEPAM 1 MG/1
1 TABLET ORAL 2 TIMES DAILY PRN
Qty: 60 TABLET | Refills: 1 | Status: SHIPPED | OUTPATIENT
Start: 2022-12-17 | End: 2022-12-17

## 2023-01-18 ENCOUNTER — PATIENT MESSAGE (OUTPATIENT)
Dept: INTERNAL MEDICINE CLINIC | Facility: CLINIC | Age: 63
End: 2023-01-18

## 2023-01-20 ENCOUNTER — PATIENT MESSAGE (OUTPATIENT)
Dept: INTERNAL MEDICINE CLINIC | Facility: CLINIC | Age: 63
End: 2023-01-20

## 2023-01-23 RX ORDER — AMOXICILLIN AND CLAVULANATE POTASSIUM 875; 125 MG/1; MG/1
1 TABLET, FILM COATED ORAL 2 TIMES DAILY
Qty: 20 TABLET | Refills: 0 | Status: SHIPPED | OUTPATIENT
Start: 2023-01-23 | End: 2023-02-02

## 2023-01-24 ENCOUNTER — TELEPHONE (OUTPATIENT)
Dept: INTERNAL MEDICINE CLINIC | Facility: CLINIC | Age: 63
End: 2023-01-24

## 2023-01-24 NOTE — TELEPHONE ENCOUNTER
Kael Hdz, [facility] 1/23/2023 3:59 PM CST      ----- Message -----  From: Lois Oliver  Sent: 1/23/2023 9:15 AM CST  To: Kanika Molina Clinical Staff  Subject: Ear     Just kind of bubbly if that makes sense   Also will look for the pre authorization from tra   Thanks   Nikko Nogueira

## 2023-01-24 NOTE — TELEPHONE ENCOUNTER
PRIOR AUTHORIZATION     Medication Name:   Tirzepatide [facility]) 5 MG/0.5ML Subcutaneous Solution Pen-injector 6 mL 0 12/17/2022     Indication per provider:  MORBID OBESITY       Received Request: 01/18/2023     Prior authorization Started: 01/24/2023     Outcome: PENDING     Was patient notified?  YES

## 2023-01-25 NOTE — TELEPHONE ENCOUNTER
Hilda Deshpande, [facility] 1/24/2023 11:25 AM CST    Can you send [id number]     ----- Message -----  From: Farhat Parikh  Sent: 1/24/2023 11:05 AM CST  To: Kanika Molina Clinical Staff  Subject: Henretta Lombard     Can we try lv  Me

## 2023-02-01 ENCOUNTER — PATIENT MESSAGE (OUTPATIENT)
Dept: INTERNAL MEDICINE CLINIC | Facility: CLINIC | Age: 63
End: 2023-02-01

## 2023-02-02 ENCOUNTER — TELEPHONE (OUTPATIENT)
Dept: INTERNAL MEDICINE CLINIC | Facility: CLINIC | Age: 63
End: 2023-02-02

## 2023-02-02 NOTE — TELEPHONE ENCOUNTER
Patient is calling in stating she would like to speak to Rj Espinoza in regard to her husbands appointment. Vianey Coffey scheduled a new patient appt in May, patient and spouse want him to be seen sooner. Please advise.

## 2023-02-02 NOTE — TELEPHONE ENCOUNTER
Patient informed unable to move up no opening offered waitlist , wife informed me needs to be a 7am appt informed her the waitlist will let her know if anything becomes available.  Patients wife asking if needs medication what should he do informed her he is not currently a patient of her he will not be able to get any medication until establishes care with her offered   , walk in or urgent declined appointment with Landon Peña and will keep may appt

## 2023-02-07 ENCOUNTER — TELEPHONE (OUTPATIENT)
Dept: INTERNAL MEDICINE CLINIC | Facility: CLINIC | Age: 63
End: 2023-02-07

## 2023-02-07 NOTE — TELEPHONE ENCOUNTER
Patient called checking on the status of the prior authorization for her [facility] medication. She said that her insurance company sent yesterday a fax to the office, and she was following up on the fax.

## 2023-02-08 NOTE — TELEPHONE ENCOUNTER
Me  to Gwen Guest    10:53 AM  Jaylen Guzman,      Just called your insurance for an update on [place] Jesse representative says its still \"under review\"      Not sure how long this will take      Shanna Manuel     This MyChart message has not been read.

## 2023-02-08 NOTE — TELEPHONE ENCOUNTER
Patient called again this morning, to check on the status of her [facility] medication. She said she has been out of her medication for 2 weeks.

## 2023-02-14 DIAGNOSIS — E66.01 MORBID OBESITY (HCC): ICD-10-CM

## 2023-02-14 DIAGNOSIS — Z51.81 THERAPEUTIC DRUG MONITORING: ICD-10-CM

## 2023-02-14 DIAGNOSIS — E66.1 CLASS 2 DRUG-INDUCED OBESITY IN ADULT, UNSPECIFIED BMI, UNSPECIFIED WHETHER SERIOUS COMORBIDITY PRESENT: ICD-10-CM

## 2023-02-14 DIAGNOSIS — L25.9 CONTACT DERMATITIS, UNSPECIFIED CONTACT DERMATITIS TYPE, UNSPECIFIED TRIGGER: ICD-10-CM

## 2023-02-14 DIAGNOSIS — F41.9 ANXIETY: ICD-10-CM

## 2023-02-14 DIAGNOSIS — Z00.00 ANNUAL PHYSICAL EXAM: ICD-10-CM

## 2023-02-14 DIAGNOSIS — Z12.31 SCREENING MAMMOGRAM, ENCOUNTER FOR: ICD-10-CM

## 2023-02-14 DIAGNOSIS — Z13.6 SCREENING FOR HEART DISEASE: ICD-10-CM

## 2023-02-14 RX ORDER — VALACYCLOVIR HYDROCHLORIDE 1 G/1
TABLET, FILM COATED ORAL
Qty: 4 TABLET | Refills: 2 | Status: SHIPPED | OUTPATIENT
Start: 2023-02-14

## 2023-02-14 RX ORDER — ONDANSETRON 4 MG/1
4 TABLET, ORALLY DISINTEGRATING ORAL EVERY 8 HOURS PRN
Qty: 30 TABLET | Refills: 1 | Status: SHIPPED | OUTPATIENT
Start: 2023-02-14

## 2023-02-14 RX ORDER — BUPROPION HYDROCHLORIDE 150 MG/1
150 TABLET ORAL DAILY
Qty: 30 TABLET | Refills: 2 | Status: SHIPPED | OUTPATIENT
Start: 2023-02-14

## 2023-02-14 RX ORDER — CETIRIZINE HYDROCHLORIDE 10 MG/1
10 TABLET ORAL EVERY MORNING
Qty: 90 TABLET | Refills: 0 | Status: SHIPPED | OUTPATIENT
Start: 2023-02-14

## 2023-02-14 RX ORDER — MELOXICAM 7.5 MG/1
7.5 TABLET ORAL 2 TIMES DAILY PRN
Qty: 60 TABLET | Refills: 1 | Status: SHIPPED | OUTPATIENT
Start: 2023-02-14

## 2023-02-15 DIAGNOSIS — F41.9 ANXIETY AND DEPRESSION: ICD-10-CM

## 2023-02-15 DIAGNOSIS — E66.01 MORBID OBESITY (HCC): ICD-10-CM

## 2023-02-15 DIAGNOSIS — F32.A ANXIETY AND DEPRESSION: ICD-10-CM

## 2023-02-15 DIAGNOSIS — Z51.81 THERAPEUTIC DRUG MONITORING: ICD-10-CM

## 2023-02-16 RX ORDER — LORAZEPAM 1 MG/1
1 TABLET ORAL 2 TIMES DAILY PRN
Qty: 60 TABLET | Refills: 0 | Status: SHIPPED | OUTPATIENT
Start: 2023-02-16

## 2023-02-19 RX ORDER — FAMOTIDINE 20 MG/1
20 TABLET, FILM COATED ORAL DAILY PRN
Qty: 90 TABLET | Refills: 3 | Status: SHIPPED | OUTPATIENT
Start: 2023-02-19

## 2023-03-02 NOTE — TELEPHONE ENCOUNTER
Raz Adamson, [facility] 3/1/2023 8:41 AM CST    Please advise?    ----- Message -----  From: Niko Dye \"Rashida\"  Sent: 3/1/2023 8:28 AM CST  To: Kanika Molina Clinical Staff  Subject: Masood Lu it's almost time for a renewal do I need a new script for . 50  If so please send to tra gómez   Thanks

## 2023-03-22 RX ORDER — BUPROPION HYDROCHLORIDE 300 MG/1
300 TABLET ORAL DAILY
Qty: 90 TABLET | Refills: 1 | Status: SHIPPED | OUTPATIENT
Start: 2023-03-22 | End: 2023-06-20

## 2023-04-05 ENCOUNTER — OFFICE VISIT (OUTPATIENT)
Dept: INTERNAL MEDICINE CLINIC | Facility: CLINIC | Age: 63
End: 2023-04-05
Payer: COMMERCIAL

## 2023-04-05 VITALS
WEIGHT: 187.63 LBS | HEIGHT: 64 IN | DIASTOLIC BLOOD PRESSURE: 86 MMHG | HEART RATE: 80 BPM | BODY MASS INDEX: 32.03 KG/M2 | SYSTOLIC BLOOD PRESSURE: 120 MMHG | OXYGEN SATURATION: 100 %

## 2023-04-05 DIAGNOSIS — F32.A ANXIETY AND DEPRESSION: ICD-10-CM

## 2023-04-05 DIAGNOSIS — Z12.31 ENCOUNTER FOR SCREENING MAMMOGRAM FOR MALIGNANT NEOPLASM OF BREAST: ICD-10-CM

## 2023-04-05 DIAGNOSIS — F41.9 ANXIETY AND DEPRESSION: ICD-10-CM

## 2023-04-05 DIAGNOSIS — Z51.81 THERAPEUTIC DRUG MONITORING: Primary | ICD-10-CM

## 2023-04-05 DIAGNOSIS — E66.9 OBESITY, CLASS I, BMI 30.0-34.9 (SEE ACTUAL BMI): ICD-10-CM

## 2023-04-05 PROCEDURE — 99214 OFFICE O/P EST MOD 30 MIN: CPT | Performed by: INTERNAL MEDICINE

## 2023-04-05 PROCEDURE — 3079F DIAST BP 80-89 MM HG: CPT | Performed by: INTERNAL MEDICINE

## 2023-04-05 PROCEDURE — 3074F SYST BP LT 130 MM HG: CPT | Performed by: INTERNAL MEDICINE

## 2023-04-05 PROCEDURE — 3008F BODY MASS INDEX DOCD: CPT | Performed by: INTERNAL MEDICINE

## 2023-06-06 RX ORDER — VALACYCLOVIR HYDROCHLORIDE 1 G/1
TABLET, FILM COATED ORAL
Qty: 4 TABLET | Refills: 2 | Status: SHIPPED | OUTPATIENT
Start: 2023-06-06

## 2023-07-01 ENCOUNTER — OFFICE VISIT (OUTPATIENT)
Dept: INTERNAL MEDICINE CLINIC | Facility: CLINIC | Age: 63
End: 2023-07-01
Payer: COMMERCIAL

## 2023-07-01 VITALS
HEART RATE: 80 BPM | OXYGEN SATURATION: 95 % | HEIGHT: 64 IN | WEIGHT: 188.38 LBS | SYSTOLIC BLOOD PRESSURE: 124 MMHG | BODY MASS INDEX: 32.16 KG/M2 | DIASTOLIC BLOOD PRESSURE: 82 MMHG

## 2023-07-01 DIAGNOSIS — Z51.81 THERAPEUTIC DRUG MONITORING: Primary | ICD-10-CM

## 2023-07-01 DIAGNOSIS — L98.7 EXCESS SKIN: ICD-10-CM

## 2023-07-01 DIAGNOSIS — F32.A ANXIETY AND DEPRESSION: ICD-10-CM

## 2023-07-01 DIAGNOSIS — E66.01 MORBID OBESITY (HCC): ICD-10-CM

## 2023-07-01 DIAGNOSIS — F41.9 ANXIETY AND DEPRESSION: ICD-10-CM

## 2023-07-01 PROCEDURE — 3079F DIAST BP 80-89 MM HG: CPT | Performed by: INTERNAL MEDICINE

## 2023-07-01 PROCEDURE — 3008F BODY MASS INDEX DOCD: CPT | Performed by: INTERNAL MEDICINE

## 2023-07-01 PROCEDURE — 3074F SYST BP LT 130 MM HG: CPT | Performed by: INTERNAL MEDICINE

## 2023-07-01 PROCEDURE — 99214 OFFICE O/P EST MOD 30 MIN: CPT | Performed by: INTERNAL MEDICINE

## 2023-07-01 RX ORDER — SEMAGLUTIDE 1.7 MG/.75ML
1.7 INJECTION, SOLUTION SUBCUTANEOUS WEEKLY
COMMUNITY
Start: 2023-04-24

## 2023-07-01 RX ORDER — NALTREXONE HYDROCHLORIDE 50 MG/1
50 TABLET, FILM COATED ORAL DAILY
Qty: 30 TABLET | Refills: 2 | Status: SHIPPED | OUTPATIENT
Start: 2023-07-01

## 2023-07-11 DIAGNOSIS — F32.A ANXIETY AND DEPRESSION: ICD-10-CM

## 2023-07-11 DIAGNOSIS — F41.9 ANXIETY AND DEPRESSION: ICD-10-CM

## 2023-07-11 DIAGNOSIS — Z51.81 THERAPEUTIC DRUG MONITORING: ICD-10-CM

## 2023-07-11 DIAGNOSIS — E66.01 MORBID OBESITY (HCC): ICD-10-CM

## 2023-07-12 NOTE — TELEPHONE ENCOUNTER
A refill request was received for:  Requested Prescriptions     Pending Prescriptions Disp Refills    LORAZEPAM 1 MG Oral Tab [Pharmacy Med Name: Lorazepam 1 Mg Tab Teva] 60 tablet 0     Sig: Take 1 tablet (1 mg total) by mouth 2 (two) times daily as needed for Anxiety. MELOXICAM 7.5 MG Oral Tab [Pharmacy Med Name: Meloxicam 7.5 Mg Tab Zydu] 30 tablet 0     Sig: Take 1 tablet (7.5 mg total) by mouth 2 (two) times daily as needed for Pain.      Last refill date:  6/6/23    Last office visit: 7/1/23      Future Appointments   Date Time Provider Corey Grady   8/15/2023  2:00 PM MD LISSY Farley [facility] [address]   10/31/2023 12:00 PM MD LISSY Farley [facility] [place] [address] Colleen Bustillo

## 2023-07-13 RX ORDER — MELOXICAM 7.5 MG/1
7.5 TABLET ORAL 2 TIMES DAILY PRN
Qty: 30 TABLET | Refills: 0 | Status: SHIPPED | OUTPATIENT
Start: 2023-07-13

## 2023-07-13 RX ORDER — LORAZEPAM 1 MG/1
1 TABLET ORAL 2 TIMES DAILY PRN
Qty: 60 TABLET | Refills: 0 | Status: SHIPPED | OUTPATIENT
Start: 2023-07-13

## 2023-07-28 ENCOUNTER — PATIENT MESSAGE (OUTPATIENT)
Dept: INTERNAL MEDICINE CLINIC | Facility: CLINIC | Age: 63
End: 2023-07-28

## 2023-07-28 DIAGNOSIS — E66.01 MORBID OBESITY (HCC): Primary | ICD-10-CM

## 2023-07-28 DIAGNOSIS — Z13.6 SCREENING FOR HEART DISEASE: ICD-10-CM

## 2023-07-28 NOTE — TELEPHONE ENCOUNTER
From: Leo Granados  To:  Anselmo Gonzalez MD  Sent: 7/28/2023 10:23 AM CDT  Subject: Calcium scoring     Hi can you write a order for the calcium scoring   My is expiring and rich and I are going together   Thanks   Nelly Navarrete

## 2023-08-04 DIAGNOSIS — E66.9 OBESITY, CLASS I, BMI 30.0-34.9 (SEE ACTUAL BMI): ICD-10-CM

## 2023-08-04 DIAGNOSIS — E66.9 OBESITY, CLASS I, BMI 30-34.9: ICD-10-CM

## 2023-08-04 DIAGNOSIS — Z12.31 ENCOUNTER FOR SCREENING MAMMOGRAM FOR MALIGNANT NEOPLASM OF BREAST: ICD-10-CM

## 2023-08-04 DIAGNOSIS — F41.9 ANXIETY AND DEPRESSION: ICD-10-CM

## 2023-08-04 DIAGNOSIS — Z51.81 THERAPEUTIC DRUG MONITORING: ICD-10-CM

## 2023-08-04 DIAGNOSIS — F32.A ANXIETY AND DEPRESSION: ICD-10-CM

## 2023-08-08 RX ORDER — SEMAGLUTIDE 2.4 MG/.75ML
2.4 INJECTION, SOLUTION SUBCUTANEOUS WEEKLY
Qty: 9 ML | Refills: 0 | Status: SHIPPED | OUTPATIENT
Start: 2023-08-08 | End: 2023-08-14

## 2023-08-08 NOTE — TELEPHONE ENCOUNTER
A refill request was received for:  Requested Prescriptions     Pending Prescriptions Disp Refills    SERTRALINE 50 MG Oral Tab [Pharmacy Med Name: Sertraline Hcl 50 Mg Tab Nort] 90 tablet 0     Sig: TAKE ONE TABLET BY MOUTH ONE TIME DAILY    WEGOVY 2.4 MG/0.75ML Subcutaneous Solution Auto-injector [Pharmacy Med Name: Wegovy 2.4 Mg/0.75ml Inj Byron] 9 mL 0     Sig: Inject 0.75 mL (2.4 mg total) into the skin once a week for 12 doses.      Last refill date:  2/1/23/ 4/25/23    Last office visit: 7/1/23      Future Appointments   Date Time Provider Corey Jamesisti   8/15/2023  2:00 PM MD LISSY Carvajal [facility] [address]   8/23/2023  6:30 PM [facility] RM1 [facility] SCAN EM [facility]   10/31/2023 12:00 PM MD LISSY Carvajal [address] [place] [address] Kimberlee Curiel

## 2023-08-09 ENCOUNTER — PATIENT MESSAGE (OUTPATIENT)
Dept: INTERNAL MEDICINE CLINIC | Facility: CLINIC | Age: 63
End: 2023-08-09

## 2023-08-09 DIAGNOSIS — E66.9 OBESITY, CLASS I, BMI 30.0-34.9 (SEE ACTUAL BMI): ICD-10-CM

## 2023-08-09 DIAGNOSIS — F32.A ANXIETY AND DEPRESSION: ICD-10-CM

## 2023-08-09 DIAGNOSIS — F41.9 ANXIETY AND DEPRESSION: ICD-10-CM

## 2023-08-09 DIAGNOSIS — Z51.81 THERAPEUTIC DRUG MONITORING: ICD-10-CM

## 2023-08-09 DIAGNOSIS — Z12.31 ENCOUNTER FOR SCREENING MAMMOGRAM FOR MALIGNANT NEOPLASM OF BREAST: ICD-10-CM

## 2023-08-14 RX ORDER — SEMAGLUTIDE 2.4 MG/.75ML
2.4 INJECTION, SOLUTION SUBCUTANEOUS WEEKLY
Qty: 9 ML | Refills: 0 | Status: SHIPPED | OUTPATIENT
Start: 2023-08-14

## 2023-08-15 ENCOUNTER — LAB ENCOUNTER (OUTPATIENT)
Dept: LAB | Age: 63
End: 2023-08-15
Attending: INTERNAL MEDICINE
Payer: COMMERCIAL

## 2023-08-15 ENCOUNTER — OFFICE VISIT (OUTPATIENT)
Dept: INTERNAL MEDICINE CLINIC | Facility: CLINIC | Age: 63
End: 2023-08-15
Payer: COMMERCIAL

## 2023-08-15 VITALS
DIASTOLIC BLOOD PRESSURE: 76 MMHG | WEIGHT: 192.81 LBS | HEIGHT: 64 IN | HEART RATE: 78 BPM | SYSTOLIC BLOOD PRESSURE: 120 MMHG | BODY MASS INDEX: 32.92 KG/M2 | OXYGEN SATURATION: 100 %

## 2023-08-15 DIAGNOSIS — Z51.81 THERAPEUTIC DRUG MONITORING: ICD-10-CM

## 2023-08-15 DIAGNOSIS — Z78.0 ASYMPTOMATIC MENOPAUSE: ICD-10-CM

## 2023-08-15 DIAGNOSIS — E66.01 MORBID OBESITY (HCC): ICD-10-CM

## 2023-08-15 DIAGNOSIS — F41.9 ANXIETY AND DEPRESSION: ICD-10-CM

## 2023-08-15 DIAGNOSIS — Z00.00 ANNUAL PHYSICAL EXAM: Primary | ICD-10-CM

## 2023-08-15 DIAGNOSIS — Z85.828 HISTORY OF SKIN CANCER: ICD-10-CM

## 2023-08-15 DIAGNOSIS — L98.7 EXCESS SKIN: ICD-10-CM

## 2023-08-15 DIAGNOSIS — F32.A ANXIETY AND DEPRESSION: ICD-10-CM

## 2023-08-15 DIAGNOSIS — E66.9 OBESITY, CLASS I, BMI 30-34.9: ICD-10-CM

## 2023-08-15 PROBLEM — C44.90 SKIN CANCER: Status: ACTIVE | Noted: 2023-08-15

## 2023-08-15 LAB
ALBUMIN SERPL-MCNC: 3.7 G/DL (ref 3.4–5)
ALBUMIN/GLOB SERPL: 1.1 {RATIO} (ref 1–2)
ALP LIVER SERPL-CCNC: 90 U/L
ALT SERPL-CCNC: 38 U/L
ANION GAP SERPL CALC-SCNC: 4 MMOL/L (ref 0–18)
AST SERPL-CCNC: 19 U/L (ref 15–37)
BASOPHILS # BLD AUTO: 0.05 X10(3) UL (ref 0–0.2)
BASOPHILS NFR BLD AUTO: 1.1 %
BILIRUB SERPL-MCNC: 0.4 MG/DL (ref 0.1–2)
BUN BLD-MCNC: 25 MG/DL (ref 7–18)
BUN/CREAT SERPL: 31.3 (ref 10–20)
CALCIUM BLD-MCNC: 9.1 MG/DL (ref 8.5–10.1)
CHLORIDE SERPL-SCNC: 111 MMOL/L (ref 98–112)
CHOLEST SERPL-MCNC: 213 MG/DL (ref ?–200)
CO2 SERPL-SCNC: 26 MMOL/L (ref 21–32)
CREAT BLD-MCNC: 0.8 MG/DL
DEPRECATED RDW RBC AUTO: 44.9 FL (ref 35.1–46.3)
EGFRCR SERPLBLD CKD-EPI 2021: 83 ML/MIN/1.73M2 (ref 60–?)
EOSINOPHIL # BLD AUTO: 0.17 X10(3) UL (ref 0–0.7)
EOSINOPHIL NFR BLD AUTO: 3.7 %
ERYTHROCYTE [DISTWIDTH] IN BLOOD BY AUTOMATED COUNT: 13.2 % (ref 11–15)
EST. AVERAGE GLUCOSE BLD GHB EST-MCNC: 103 MG/DL (ref 68–126)
FASTING PATIENT LIPID ANSWER: YES
FASTING STATUS PATIENT QL REPORTED: YES
GLOBULIN PLAS-MCNC: 3.3 G/DL (ref 2.8–4.4)
GLUCOSE BLD-MCNC: 80 MG/DL (ref 70–99)
HBA1C MFR BLD: 5.2 % (ref ?–5.7)
HCT VFR BLD AUTO: 39.8 %
HCV AB SERPL QL IA: NONREACTIVE
HDLC SERPL-MCNC: 67 MG/DL (ref 40–59)
HGB BLD-MCNC: 13.3 G/DL
IMM GRANULOCYTES # BLD AUTO: 0.01 X10(3) UL (ref 0–1)
IMM GRANULOCYTES NFR BLD: 0.2 %
LDLC SERPL CALC-MCNC: 135 MG/DL (ref ?–100)
LYMPHOCYTES # BLD AUTO: 1.42 X10(3) UL (ref 1–4)
LYMPHOCYTES NFR BLD AUTO: 30.9 %
MCH RBC QN AUTO: 30.5 PG (ref 26–34)
MCHC RBC AUTO-ENTMCNC: 33.4 G/DL (ref 31–37)
MCV RBC AUTO: 91.3 FL
MONOCYTES # BLD AUTO: 0.37 X10(3) UL (ref 0.1–1)
MONOCYTES NFR BLD AUTO: 8 %
NEUTROPHILS # BLD AUTO: 2.58 X10 (3) UL (ref 1.5–7.7)
NEUTROPHILS # BLD AUTO: 2.58 X10(3) UL (ref 1.5–7.7)
NEUTROPHILS NFR BLD AUTO: 56.1 %
NONHDLC SERPL-MCNC: 146 MG/DL (ref ?–130)
OSMOLALITY SERPL CALC.SUM OF ELEC: 295 MOSM/KG (ref 275–295)
PLATELET # BLD AUTO: 213 10(3)UL (ref 150–450)
POTASSIUM SERPL-SCNC: 4.4 MMOL/L (ref 3.5–5.1)
PROT SERPL-MCNC: 7 G/DL (ref 6.4–8.2)
RBC # BLD AUTO: 4.36 X10(6)UL
SODIUM SERPL-SCNC: 141 MMOL/L (ref 136–145)
TRIGL SERPL-MCNC: 63 MG/DL (ref 30–149)
TSI SER-ACNC: 0.9 MIU/ML (ref 0.36–3.74)
VIT D+METAB SERPL-MCNC: 30.4 NG/ML (ref 30–100)
VLDLC SERPL CALC-MCNC: 11 MG/DL (ref 0–30)
WBC # BLD AUTO: 4.6 X10(3) UL (ref 4–11)

## 2023-08-15 PROCEDURE — 99396 PREV VISIT EST AGE 40-64: CPT | Performed by: INTERNAL MEDICINE

## 2023-08-15 PROCEDURE — 86803 HEPATITIS C AB TEST: CPT

## 2023-08-15 PROCEDURE — 83036 HEMOGLOBIN GLYCOSYLATED A1C: CPT

## 2023-08-15 PROCEDURE — 80053 COMPREHEN METABOLIC PANEL: CPT

## 2023-08-15 PROCEDURE — 84443 ASSAY THYROID STIM HORMONE: CPT

## 2023-08-15 PROCEDURE — 80061 LIPID PANEL: CPT

## 2023-08-15 PROCEDURE — 85025 COMPLETE CBC W/AUTO DIFF WBC: CPT

## 2023-08-15 PROCEDURE — 36415 COLL VENOUS BLD VENIPUNCTURE: CPT

## 2023-08-15 PROCEDURE — 3008F BODY MASS INDEX DOCD: CPT | Performed by: INTERNAL MEDICINE

## 2023-08-15 PROCEDURE — 3078F DIAST BP <80 MM HG: CPT | Performed by: INTERNAL MEDICINE

## 2023-08-15 PROCEDURE — 3074F SYST BP LT 130 MM HG: CPT | Performed by: INTERNAL MEDICINE

## 2023-08-15 PROCEDURE — 82306 VITAMIN D 25 HYDROXY: CPT

## 2023-08-15 NOTE — TELEPHONE ENCOUNTER
Umair Brown [place] 8/10/2023 8:24 AM CDT      ----- Message -----  From: Aysha Peoples \"Rashida\"  Sent: 8/9/2023 8:29 PM CDT  To: Kanika Molina Clinical Staff  Subject: Refill     Hi can you send a script to [facility] I'm trying to get it filled it's back ordered everywhere   Saranya Uriostegui

## 2023-08-23 ENCOUNTER — HOSPITAL ENCOUNTER (OUTPATIENT)
Dept: CT IMAGING | Facility: HOSPITAL | Age: 63
Discharge: HOME | End: 2023-08-23
Attending: INTERNAL MEDICINE

## 2023-08-23 DIAGNOSIS — E66.01 MORBID OBESITY (HCC): ICD-10-CM

## 2023-08-23 DIAGNOSIS — Z13.6 SCREENING FOR HEART DISEASE: ICD-10-CM

## 2023-08-23 NOTE — PROGRESS NOTES
Date of Service 8/23/2023    Froylan Hall  Date of Birth 4/29/1960    Patient Age: 61year old    PCP: Shravan Johnson MD  [facility] [address]    Heart Scan Consult  Preliminary Heart Scan Score: 0    Previous Screening  Heart Scan Completed Previously: No                   Risk Factors  Personal Risk Factors  Alterable Risk Factors: Abnormal Cholesterol;Lack of exercise      Body Mass Index  There is no height or weight on file to calculate BMI. Blood Pressure  There were no vitals taken for this visit. 120/76  (Normal =< 120/80,  Elevated = 120-129/ >80,  High Stage1 130-139/80-89 , Stage2 >140/>90)    Lipid Profile  Cholesterol: 213, done on 8/15/2023. HDL Cholesterol: 67, done on 8/15/2023. LDL Cholesterol: 135, done on 8/15/2023. TriGlycerides 63, done on 8/15/2023. Cholesterol Goals  Value   Total  =< 200   HDL  = > 45 Men = > 55 Women   LDL   =< 100   Triglycerides  =< 150       Glucose and Hemoglobin A1C  Lab Results   Component Value Date    GLU 80 08/15/2023    A1C 5.2 08/15/2023     (Normal Fasting Glucose < 100mg/dl )    Nurse Review  Risk factor information and results reviewed with Nurse: Yes    Recommended Follow Up:  Consult your physician regarding[de-identified] Final Heart Scan Report; Discuss potential for Incidental Finding      Recommendations for Change:       Cholesterol Modification (goal of therapy depends upon your risk): Decrease LDL (Lousy/Bad) Ideal <100    Exercise: Initiate Program    Smoking Cessation: > 1 Year Ago    Weight Management: Decrease Current Weight    Stress Management: Adopt Stress Management Techniques    Repeat Heart Scan: 5 years if Calcium Score is 0. 0; Discuss with your Physician              [place] Recommended Resources:  Recommended Resources: PV Screening;Upcoming Classes, Medical Services and [employer]. [email or web address]. [email or web address]  Recommended PV Screening: Abdomen;Carotids (Discussed screening and gave brochure.)         Rebekah Arambula RN        Please Contact the Nurse Heart Line with any Questions or Concerns [phone]. [phone] Hour(s) 6 Minute(s)

## 2023-08-28 ENCOUNTER — PATIENT MESSAGE (OUTPATIENT)
Dept: INTERNAL MEDICINE CLINIC | Facility: CLINIC | Age: 63
End: 2023-08-28

## 2023-08-28 DIAGNOSIS — Z51.81 THERAPEUTIC DRUG MONITORING: Primary | ICD-10-CM

## 2023-08-28 NOTE — TELEPHONE ENCOUNTER
Mary Cooper 8/28/2023 12:42 PM CDT      ----- Message -----  From: Boubacar Sen \"Rashida\"  Sent: 8/28/2023 12:14 PM CDT  To: Kanika Molina Clinical Staff  Subject: Phentermine     Can we try a lose dose of  Phentermine   I don't want it to keep me up at night     Thanks   Carlos Maguire

## 2023-08-31 ENCOUNTER — EKG ENCOUNTER (OUTPATIENT)
Dept: LAB | Age: 63
End: 2023-08-31
Attending: INTERNAL MEDICINE
Payer: COMMERCIAL

## 2023-08-31 DIAGNOSIS — Z51.81 THERAPEUTIC DRUG MONITORING: ICD-10-CM

## 2023-08-31 LAB
ATRIAL RATE: 75 BPM
P AXIS: 70 DEGREES
P-R INTERVAL: 184 MS
Q-T INTERVAL: 396 MS
QRS DURATION: 108 MS
QTC CALCULATION (BEZET): 442 MS
R AXIS: 67 DEGREES
T AXIS: 74 DEGREES
VENTRICULAR RATE: 75 BPM

## 2023-08-31 PROCEDURE — 93005 ELECTROCARDIOGRAM TRACING: CPT

## 2023-08-31 PROCEDURE — 93010 ELECTROCARDIOGRAM REPORT: CPT | Performed by: INTERNAL MEDICINE

## 2023-09-01 RX ORDER — PHENTERMINE HYDROCHLORIDE 15 MG/1
15 CAPSULE ORAL EVERY MORNING
Qty: 60 CAPSULE | Refills: 0 | Status: SHIPPED | OUTPATIENT
Start: 2023-09-01 | End: 2023-10-31

## 2023-09-05 DIAGNOSIS — E66.01 MORBID OBESITY (HCC): ICD-10-CM

## 2023-09-05 DIAGNOSIS — F32.A ANXIETY AND DEPRESSION: ICD-10-CM

## 2023-09-05 DIAGNOSIS — F41.9 ANXIETY AND DEPRESSION: ICD-10-CM

## 2023-09-05 DIAGNOSIS — Z51.81 THERAPEUTIC DRUG MONITORING: ICD-10-CM

## 2023-09-05 NOTE — TELEPHONE ENCOUNTER
A refill request was received for:  Requested Prescriptions     Pending Prescriptions Disp Refills    MELOXICAM 7.5 MG Oral Tab [Pharmacy Med Name: Meloxicam 7.5 Mg Tab Zydu] 60 tablet 0     Sig: Take 1 tablet (7.5 mg total) by mouth 2 (two) times daily as needed for Pain. LORAZEPAM 1 MG Oral Tab [Pharmacy Med Name: Lorazepam 1 Mg Tab Teva] 60 tablet 0     Sig: Take 1 tablet (1 mg total) by mouth 2 (two) times daily as needed for Anxiety.      Last refill date:  7/13/23    Last office visit: 8/15/23      Future Appointments   Date Time Provider Corey Grady   10/24/2023  1:40 PM [facility]EXA RM1 [facility] Dexa EM [place]   10/31/2023 12:00 PM MD LISSY Gaffney [facility] [address]   12/19/2023  6:40 AM [facility] RM1 [facility]   1/15/2024  8:40 AM MD LISSY Gaffney [address] [place] [address] [address]

## 2023-09-06 RX ORDER — LORAZEPAM 1 MG/1
1 TABLET ORAL 2 TIMES DAILY PRN
Qty: 60 TABLET | Refills: 0 | Status: SHIPPED | OUTPATIENT
Start: 2023-09-06

## 2023-09-06 RX ORDER — MELOXICAM 7.5 MG/1
7.5 TABLET ORAL 2 TIMES DAILY PRN
Qty: 60 TABLET | Refills: 0 | Status: SHIPPED | OUTPATIENT
Start: 2023-09-06

## 2023-10-14 ENCOUNTER — PATIENT MESSAGE (OUTPATIENT)
Dept: INTERNAL MEDICINE CLINIC | Facility: CLINIC | Age: 63
End: 2023-10-14

## 2023-10-16 DIAGNOSIS — F41.9 ANXIETY AND DEPRESSION: ICD-10-CM

## 2023-10-16 DIAGNOSIS — F32.A ANXIETY AND DEPRESSION: ICD-10-CM

## 2023-10-16 DIAGNOSIS — Z12.31 ENCOUNTER FOR SCREENING MAMMOGRAM FOR MALIGNANT NEOPLASM OF BREAST: ICD-10-CM

## 2023-10-16 DIAGNOSIS — E66.9 OBESITY, CLASS I, BMI 30.0-34.9 (SEE ACTUAL BMI): ICD-10-CM

## 2023-10-16 DIAGNOSIS — Z51.81 THERAPEUTIC DRUG MONITORING: ICD-10-CM

## 2023-10-16 RX ORDER — PHENTERMINE HYDROCHLORIDE 30 MG/1
30 CAPSULE ORAL EVERY MORNING
Qty: 90 CAPSULE | Refills: 0 | Status: SHIPPED | OUTPATIENT
Start: 2023-10-16 | End: 2024-01-14

## 2023-10-16 RX ORDER — SEMAGLUTIDE 2.4 MG/.75ML
2.4 INJECTION, SOLUTION SUBCUTANEOUS WEEKLY
Qty: 9 ML | Refills: 1 | Status: SHIPPED | OUTPATIENT
Start: 2023-10-16 | End: 2024-01-14

## 2023-10-16 NOTE — TELEPHONE ENCOUNTER
Last OV:8-15-23  Last refill:8-15-23    Next Appt:  With Internal Medicine Levy Del Real MD)  10/31/2023 at 12:00 PM

## 2023-10-17 DIAGNOSIS — F41.9 ANXIETY AND DEPRESSION: ICD-10-CM

## 2023-10-17 DIAGNOSIS — Z51.81 THERAPEUTIC DRUG MONITORING: ICD-10-CM

## 2023-10-17 DIAGNOSIS — E66.01 MORBID OBESITY (HCC): ICD-10-CM

## 2023-10-17 DIAGNOSIS — F32.A ANXIETY AND DEPRESSION: ICD-10-CM

## 2023-10-17 NOTE — TELEPHONE ENCOUNTER
Last OV:8-15-23  Last refill:9-6-23      Next Appt:  With Internal Medicine Henry Méndez MD)  10/31/2023 at 12:00 PM

## 2023-10-18 RX ORDER — LORAZEPAM 1 MG/1
1 TABLET ORAL 2 TIMES DAILY PRN
Qty: 60 TABLET | Refills: 0 | Status: SHIPPED | OUTPATIENT
Start: 2023-10-18

## 2023-10-31 ENCOUNTER — OFFICE VISIT (OUTPATIENT)
Dept: INTERNAL MEDICINE CLINIC | Facility: CLINIC | Age: 63
End: 2023-10-31

## 2023-10-31 VITALS
BODY MASS INDEX: 32.41 KG/M2 | SYSTOLIC BLOOD PRESSURE: 128 MMHG | DIASTOLIC BLOOD PRESSURE: 76 MMHG | HEIGHT: 64 IN | OXYGEN SATURATION: 97 % | WEIGHT: 189.81 LBS | HEART RATE: 73 BPM | TEMPERATURE: 97 F

## 2023-10-31 DIAGNOSIS — Z51.81 THERAPEUTIC DRUG MONITORING: Primary | ICD-10-CM

## 2023-10-31 DIAGNOSIS — E66.01 MORBID OBESITY (HCC): ICD-10-CM

## 2023-10-31 DIAGNOSIS — Z23 NEED FOR VACCINATION: ICD-10-CM

## 2023-10-31 PROCEDURE — 3074F SYST BP LT 130 MM HG: CPT | Performed by: INTERNAL MEDICINE

## 2023-10-31 PROCEDURE — 3078F DIAST BP <80 MM HG: CPT | Performed by: INTERNAL MEDICINE

## 2023-10-31 PROCEDURE — 3008F BODY MASS INDEX DOCD: CPT | Performed by: INTERNAL MEDICINE

## 2023-10-31 PROCEDURE — 90686 IIV4 VACC NO PRSV 0.5 ML IM: CPT | Performed by: INTERNAL MEDICINE

## 2023-10-31 PROCEDURE — 99213 OFFICE O/P EST LOW 20 MIN: CPT | Performed by: INTERNAL MEDICINE

## 2023-10-31 PROCEDURE — 90471 IMMUNIZATION ADMIN: CPT | Performed by: INTERNAL MEDICINE

## 2023-11-20 ENCOUNTER — PATIENT MESSAGE (OUTPATIENT)
Dept: INTERNAL MEDICINE CLINIC | Facility: CLINIC | Age: 63
End: 2023-11-20

## 2023-11-20 ENCOUNTER — TELEPHONE (OUTPATIENT)
Dept: INTERNAL MEDICINE CLINIC | Facility: CLINIC | Age: 63
End: 2023-11-20

## 2023-11-20 RX ORDER — RNA INGREDIENT BNT-162B2 0.23 G/1.8ML
0.3 INJECTION, SUSPENSION INTRAMUSCULAR ONCE
Qty: 6.3 ML | Refills: 0 | Status: SHIPPED | OUTPATIENT
Start: 2023-11-20 | End: 2023-11-20

## 2023-11-20 NOTE — TELEPHONE ENCOUNTER
Patient called the office as per her insurance, since she has an HMO she needs a script from her doctor to get the current Covid booster. She will get the booster at the [address] [place] [address] in [place].     [id number] - [address] [facility] [phone], [address] [place] [address]   Phone: [phone] Fax: [phone]   Hours: Not open 24 hours

## 2023-11-21 RX ORDER — MODERNA COVID-19 VACCINE, BIVALENT 25; 25 UG/.5ML; UG/.5ML
0.5 INJECTION, SUSPENSION INTRAMUSCULAR
Qty: 0.5 ML | Refills: 0 | Status: SHIPPED | OUTPATIENT
Start: 2023-11-21

## 2023-11-22 NOTE — TELEPHONE ENCOUNTER
Rose Ruvalcaba, [facility] 11/21/2023 7:53 AM CST      ----- Message -----  From: Scott Bush \"Rashida\"  Sent: 11/20/2023 7:04 PM CST  To: Kanika Molina Clinical Staff  Subject: Luigi Woodson  We have always had Trinity Duque   Our booster is written for Markus Jimenez we are scared to switch what do you think   Please rewrite the script if you think we should stick with [place] and I   thanks   Happy Thanksgiving

## 2023-12-26 RX ORDER — MELOXICAM 7.5 MG/1
7.5 TABLET ORAL 2 TIMES DAILY PRN
Qty: 60 TABLET | Refills: 0 | Status: SHIPPED | OUTPATIENT
Start: 2023-12-26

## 2023-12-26 NOTE — TELEPHONE ENCOUNTER
A refill request was received for:  Requested Prescriptions     Pending Prescriptions Disp Refills    MELOXICAM 7.5 MG Oral Tab [Pharmacy Med Name: Meloxicam 7.5 Mg Tab Zydu] 60 tablet 0     Sig: Take 1 tablet (7.5 mg total) by mouth 2 (two) times daily as needed for Pain.      Last refill date:  9/6/23    Last office visit: 10/31/23      Future Appointments   Date Time Provider Corey Grady   1/4/2024 11:40 AM [address]   1/11/2024  7:40 AM [facility] DEXA [address] DEXA [facility]   1/15/2024  8:40 AM MD LISSY Carvajal [address]   4/15/2024  7:00 AM MD LISSY Carvajal [address] [facility]

## 2024-01-04 ENCOUNTER — HOSPITAL ENCOUNTER (OUTPATIENT)
Dept: MAMMOGRAPHY | Facility: HOSPITAL | Age: 64
Discharge: HOME | End: 2024-01-04
Attending: INTERNAL MEDICINE
Payer: COMMERCIAL

## 2024-01-04 DIAGNOSIS — Z12.31 ENCOUNTER FOR SCREENING MAMMOGRAM FOR MALIGNANT NEOPLASM OF BREAST: ICD-10-CM

## 2024-01-04 DIAGNOSIS — F41.9 ANXIETY AND DEPRESSION: ICD-10-CM

## 2024-01-04 DIAGNOSIS — E66.9 OBESITY, CLASS I, BMI 30.0-34.9 (SEE ACTUAL BMI): ICD-10-CM

## 2024-01-04 DIAGNOSIS — Z51.81 THERAPEUTIC DRUG MONITORING: ICD-10-CM

## 2024-01-04 DIAGNOSIS — F32.A ANXIETY AND DEPRESSION: ICD-10-CM

## 2024-01-04 PROCEDURE — 77067 SCR MAMMO BI INCL CAD: CPT | Performed by: INTERNAL MEDICINE

## 2024-01-04 PROCEDURE — 77063 BREAST TOMOSYNTHESIS BI: CPT | Performed by: INTERNAL MEDICINE

## 2024-01-15 ENCOUNTER — OFFICE VISIT (OUTPATIENT)
Dept: INTERNAL MEDICINE CLINIC | Facility: CLINIC | Age: 64
End: 2024-01-15
Payer: COMMERCIAL

## 2024-01-15 VITALS
HEART RATE: 66 BPM | SYSTOLIC BLOOD PRESSURE: 130 MMHG | WEIGHT: 179 LBS | HEIGHT: 64 IN | BODY MASS INDEX: 30.56 KG/M2 | DIASTOLIC BLOOD PRESSURE: 80 MMHG | OXYGEN SATURATION: 99 %

## 2024-01-15 DIAGNOSIS — E66.9 CLASS 1 OBESITY WITHOUT SERIOUS COMORBIDITY WITH BODY MASS INDEX (BMI) OF 30.0 TO 30.9 IN ADULT, UNSPECIFIED OBESITY TYPE: ICD-10-CM

## 2024-01-15 DIAGNOSIS — Z51.81 THERAPEUTIC DRUG MONITORING: Primary | ICD-10-CM

## 2024-01-15 DIAGNOSIS — K59.00 CONSTIPATION, UNSPECIFIED CONSTIPATION TYPE: ICD-10-CM

## 2024-01-15 PROCEDURE — 99213 OFFICE O/P EST LOW 20 MIN: CPT | Performed by: INTERNAL MEDICINE

## 2024-01-15 PROCEDURE — 3075F SYST BP GE 130 - 139MM HG: CPT | Performed by: INTERNAL MEDICINE

## 2024-01-15 PROCEDURE — 3008F BODY MASS INDEX DOCD: CPT | Performed by: INTERNAL MEDICINE

## 2024-01-15 PROCEDURE — 3079F DIAST BP 80-89 MM HG: CPT | Performed by: INTERNAL MEDICINE

## 2024-01-15 RX ORDER — DOCUSATE SODIUM 100 MG/1
100 CAPSULE, LIQUID FILLED ORAL 2 TIMES DAILY
Qty: 60 CAPSULE | Refills: 2 | Status: SHIPPED | OUTPATIENT
Start: 2024-01-15

## 2024-01-15 RX ORDER — PHENTERMINE HYDROCHLORIDE 30 MG/1
30 CAPSULE ORAL EVERY MORNING
Qty: 90 CAPSULE | Refills: 0 | Status: SHIPPED
Start: 2024-01-15 | End: 2024-01-15

## 2024-01-15 RX ORDER — PHENTERMINE HYDROCHLORIDE 30 MG/1
30 CAPSULE ORAL EVERY MORNING
Qty: 90 CAPSULE | Refills: 0 | Status: SHIPPED | OUTPATIENT
Start: 2024-01-15 | End: 2024-04-14

## 2024-01-15 RX ORDER — ONDANSETRON 4 MG/1
4 TABLET, ORALLY DISINTEGRATING ORAL EVERY 8 HOURS PRN
Qty: 30 TABLET | Refills: 0 | Status: SHIPPED | OUTPATIENT
Start: 2024-01-15

## 2024-01-15 NOTE — PROGRESS NOTES
Enedina Knapp is a 63 year old female.    Chief complaint:weight loss follow up , medication refill, therapeutic drug monitoring    HPI:   ENEDINA here for follow up on weight loss   Wt Readings from Last 12 Encounters:   01/15/24 179 lb (81.2 kg)   10/31/23 189 lb 12.8 oz (86.1 kg)   08/15/23 192 lb 12.8 oz (87.5 kg)   07/01/23 188 lb 6.4 oz (85.5 kg)   04/05/23 187 lb 9.6 oz (85.1 kg)   11/07/22 183 lb 6.4 oz (83.2 kg)   08/08/22 188 lb 9.6 oz (85.5 kg)   05/16/22 195 lb 3.2 oz (88.5 kg)   03/15/22 206 lb 12.8 oz (93.8 kg)   12/16/21 216 lb (98 kg)   11/15/21 225 lb 9.6 oz (102.3 kg)   09/15/21 236 lb 9.6 oz (107.3 kg)     Starting weight: 251 lbs   Total weight loss: 72 lbs   Medication: wellbutrin   And phentermine 30 mg   Wegovy 2.4 mg         Typical diet   Breakfast: Lunch: Dinner: Snacks:   Skips breakfast   Fasting until noon Chicken wrap   Turkey wrap   Olives    Same   Low carb bread wrap   Chicken salad  Doesn't snack          Soda/ juice/ alcohol: Yes alcohol drink every couple of weekends   No soda or juices     Water intake: adequate  Exercise: Yes: walking  and exercising 3-4 times per week     Challenges: hasnot been feeling good   Only eating half   [place] when to stop     Side effect of medication: no   Score to self ( how well she is doing ):7/10       Constipation   Not using anything for constipation       Current Outpatient Medications   Medication Sig Dispense Refill    Meloxicam 7.5 MG Oral Tab Take 1 tablet (7.5 mg total) by mouth 2 (two) times daily as needed for Pain. 60 tablet 0    COVID-19 mRNA Bival Vac Booster (MODERNA COVID-19 BIVALENT) 50 MCG/0.5ML Intramuscular Suspension Inject 0.5 mL (50 mcg total) into the muscle Prior to discharge. 0.5 mL 0    LORazepam 1 MG Oral Tab Take 1 tablet (1 mg total) by mouth 2 (two) times daily as needed for Anxiety. 60 tablet 0    LORazepam 1 MG Oral Tab Take 1 tablet (1 mg total) by mouth 2 (two) times daily as needed for Anxiety. 60 tablet  0    sertraline 50 MG Oral Tab Take 1 tablet (50 mg total) by mouth daily. 90 tablet 3    LORazepam 1 MG Oral Tab Take 1 tablet (1 mg total) by mouth 2 (two) times daily as needed for Anxiety. 60 tablet 0    WEGOVY 1.7 MG/0.75ML Subcutaneous Solution Auto-injector Inject 0.75 mL (1.7 mg total) into the skin once a week.      naltrexone 50 MG Oral Tab Take 1 tablet (50 mg total) by mouth daily. 30 tablet 2    valACYclovir 1 G Oral Tab 2 tabs bid x 1 day 4 tablet 2    famotidine 20 MG Oral Tab Take 1 tablet (20 mg total) by mouth daily as needed. 90 tablet 3    cetirizine 10 MG Oral Tab Take 1 tablet (10 mg total) by mouth every morning. 90 tablet 0    ondansetron 4 MG Oral Tablet Dispersible Take 1 tablet (4 mg total) by mouth every 8 (eight) hours as needed for Nausea. 30 tablet 1    ERGOCALCIFEROL 1.25 MG (87843 UT) Oral Cap TAKE 1 CAPSULE (50,000 UNITS TOTAL) BY MOUTH ONCE A WEEK FOR 12 DOSES 12 capsule 1    FLOWFLEX COVID-19 AG HOME TEST In Vitro Kit FOR PERSONAL USE ONLY. NOT FOR EMPLOYMENT PURPOSES OR FOR RESALE.      clobetasol 0.05 % External Solution Apply 1 mL topically 2 (two) times daily. 25 mL 1    CLOTRIMAZOLE-BETAMETHASONE 1-0.05 % External Cream APPLY 1 APPLICATION TOPICALLY 2 (TWO) TIMES DAILY AS NEEDED. 15 g 1    LORAZEPAM 1 MG Oral Tab TAKE 1 TABLET BY MOUTH TWICE A DAY 60 tablet 0    Pantoprazole Sodium 40 MG Oral Tab EC Take 1 tablet (40 mg total) by mouth every morning before breakfast. 90 tablet 1    Fluticasone Furoate (FLONASE SENSIMIST) 27.5 MCG/SPRAY Nasal Suspension 2 sprays, one at a time to each nostril x 15 days. 1 each 0    clotrimazole 1 % External Cream APPLY 1 APPLICATION TOPICALLY 2 (TWO) TIMES DAILY FOR 14 DAYS.  1      Past Medical History:   Diagnosis Date    Anxiety     Anxiety and depression 11/10/2017    Arthritis     Class 2 obesity with serious comorbidity and body mass index (BMI) of 38.0 to 38.9 in adult 11/10/2017    Fibromyalgia     Skin cancer 08/15/2023     Past  Surgical History:   Procedure Laterality Date          x2    CHOLECYSTECTOMY  2005    Open elidia.     ELECTROCARDIOGRAM, COMPLETE  2013    SCANNED TO MEDIA TAB - 2013    HYSTERECTOMY  2008     partial, No BSO, no abNL paps.     REPAIR ROTATOR CUFF,ACUTE  2004        Social History:  Social History     Socioeconomic History    Marital status:    Tobacco Use    Smoking status: Former     Years: 22     Types: Cigarettes     Quit date: 1992     Years since quittin.8    Smokeless tobacco: Never   Substance and Sexual Activity    Alcohol use: No     Alcohol/week: 0.0 standard drinks of alcohol     Comment: Rarely    Drug use: No   Other Topics Concern    Caffeine Concern No        Family History   Problem Relation Age of Onset    Hypertension Mother     Other (Other) Mother         thyroid    Other (Other) Other         hashimoto    Cancer Father     Heart Disorder Maternal Grandfather 52        mi    Heart Disease Maternal Grandfather 52        CAD    Diabetes Maternal Grandfather     Diabetes Maternal Grandmother     Heart Disease Maternal Grandmother         CAD     Patient Active Problem List   Diagnosis    Abnormal results of thyroid function studies    Routine general medical examination at a health care facility    Elevated blood pressure reading    Class 2 obesity with serious comorbidity and body mass index (BMI) of 38.0 to 38.9 in adult    Anxiety and depression    S/P partial hysterectomy    Morbid obesity (HCC)    Strain of lumbar region    Therapeutic drug monitoring    S/P hysterectomy    Skin cancer    History of skin cancer               REVIEW OF SYSTEMS:   A comprehensive 10 point review of systems was completed.  Pertinent positives and negatives noted in the the HPI          PHYSICAL EXAM:   /80   Pulse 66   Ht 5' 4\" (1.626 m)   Wt 179 lb (81.2 kg)   SpO2 99%   BMI 30.73 kg/m²   GENERAL: well developed, well nourished,in no apparent distress  LUNGS: clear  to auscultation  CARDIO: RRR without murmur  NEURO: no gross deficits              No orders of the defined types were placed in this encounter.        ASSESSMENT/PLAN:       ICD-10-CM    1. Therapeutic drug monitoring  Z51.81 docusate sodium 100 MG Oral Cap     ondansetron 4 MG Oral Tablet Dispersible     Phentermine HCl 30 MG Oral Cap      2. Constipation, unspecified constipation type  K59.00 docusate sodium 100 MG Oral Cap     ondansetron 4 MG Oral Tablet Dispersible     Phentermine HCl 30 MG Oral Cap      3. Class 1 obesity without serious comorbidity with body mass index (BMI) of 30.0 to 30.9 in adult, unspecified obesity type  E66.9 docusate sodium 100 MG Oral Cap    Z68.30 ondansetron 4 MG Oral Tablet Dispersible     Phentermine HCl 30 MG Oral Cap         Plan:  Patient has lost 10 lbs # since LOV  Patient has lost a total weight loss of 72 lbs # since first weight loss consult.   Advised to continue with low carb diet   Goal is less than 50 g per day   Advised to read nutrition labels  Log in carbs if possible   Increase protein intake   exercise goal is 1 hour 3 times per week cardio and strength training   discussed challenges with weight loss   Weigh once a week at least   Avoid sugary drinks or artificially sweetened drinks  Water intake goal is 64 oz per day   Goal weight loss is 9 lbs in 3 months   Continue phentermine and wegovy   Advised to take 2 kiwis per day   Miralax daily as needed   Colace         Plan:  Nutrition: low carb diet   Referral RD/nutritionist :no   Behavior:  Motivational interviewing performed      Discussed strategies to overcome habits/challenges       Reviewed:  Nutrition and the importance of regular protein intake  Labs ordered:    no   Alcohol as possible source of hidden/amnesia calories and its effects  Importance of physical activity and reducing sedentary time  Treatment plan     Please return to the clinic if you are having persistent or worsening symptoms   Vimal  MD Dwight,   Diplomate of the American Board of Internal Medicine  Diplomate of the American Board of Obesity Medicine

## 2024-01-24 ENCOUNTER — NURSE TRIAGE (OUTPATIENT)
Dept: INTERNAL MEDICINE CLINIC | Facility: CLINIC | Age: 64
End: 2024-01-24

## 2024-01-24 ENCOUNTER — PATIENT MESSAGE (OUTPATIENT)
Dept: INTERNAL MEDICINE CLINIC | Facility: CLINIC | Age: 64
End: 2024-01-24

## 2024-01-24 NOTE — TELEPHONE ENCOUNTER
Future Appointments   Date Time Provider Department Center   1/24/2024  3:30 PM [facility] [address]   4/15/2024  7:00 AM Vimal Quintanilla MD [facility] [address]   7/15/2024  7:00 AM Vimal Quintanilla MD [facility] [address]   8/19/2024  7:00 AM Vimal Quintanilla MD [facility] [address]      Reason for Disposition   All other earaches  (Exceptions: Earache lasting < 1 hour, and earache from air travel.)    Answer Assessment - Initial Assessment Questions  1. LOCATION: \"Which ear is involved?\"      Left ear   2. ONSET: \"When did the ear start hurting\"       1/23/25  3. SEVERITY: \"How bad is the pain?\"  (Scale 1-10; mild, moderate or severe)    - MILD (1-3): doesn't interfere with normal activities     - MODERATE (4-7): interferes with normal activities or awakens from sleep     - SEVERE (8-10): excruciating pain, unable to do any normal activities       7/10  4. URI SYMPTOMS: \"Do you have a runny nose or cough?\"      No   5. FEVER: \"Do you have a fever?\" If Yes, ask: \"What is your temperature, how was it measured, and when did it start?\"      No   6. CAUSE: \"Have you been swimming recently?\", \"How often do you use Q-TIPS?\", \"Have you had any recent air travel or scuba diving?\"      Never  7. OTHER SYMPTOMS: \"Do you have any other symptoms?\" (e.g., headache, stiff neck, dizziness, vomiting, runny nose, decreased hearing)      No   8. PREGNANCY: \"Is there any chance you are pregnant?\" \"When was your last menstrual period?\"      No    Protocols used: Earache-A-[place]

## 2024-01-24 NOTE — TELEPHONE ENCOUNTER
Future Appointments   Date Time Provider Department Center   1/24/2024  3:30 PM Stephanie Kern APRN [facility] [address]   4/15/2024  7:00 AM Vimal Quintanilla MD [facility] [address]   7/15/2024  7:00 AM Vimal Quintanilla MD [facility] [address]   8/19/2024  7:00 AM Vimal Quintanilla MD [facility] [address]

## 2024-01-24 NOTE — TELEPHONE ENCOUNTER
From: Candice Knapp  To: Vimal Quintanilla  Sent: 1/24/2024 9:59 AM CST  Subject: Ear    Hi I have pain in my ear not constant but enough to make me contact you   If there is a way that I don’t have to come in   I would appreciate it even a video visit   I have a new grandson as of Sunday so I’m trying not to be exposed   Thanks   Rashida

## 2024-02-09 ENCOUNTER — PATIENT MESSAGE (OUTPATIENT)
Dept: INTERNAL MEDICINE CLINIC | Facility: CLINIC | Age: 64
End: 2024-02-09

## 2024-02-13 ENCOUNTER — TELEPHONE (OUTPATIENT)
Dept: INTERNAL MEDICINE CLINIC | Facility: CLINIC | Age: 64
End: 2024-02-13

## 2024-02-13 NOTE — TELEPHONE ENCOUNTER
PRIOR AUTHORIZATION     Medication Name:     semaglutide-weight management 1.7 MG/0.75ML Subcutaneous Solution Auto-injector           Indication per provider:    Class 1 obesity without serious comorbidity with body mass index (BMI) of 30.0 to 30.9 in adult, unspecified obesity type E66.9, Z68.30      ENEDINA SHAH (Lion: [id number]) -     Wegovy 1.7MG/0.75ML auto-injectors  Status: PA RequestCreated: February 13th, 2024 [phone]: February 13th, 2024

## 2024-02-13 NOTE — TELEPHONE ENCOUNTER
Eleonora Madden, [facility] 2/12/2024 3:13 PM CST    Process PA?    ----- Message -----  From: Candice Knapp \"Rashida\"  Sent: 2/11/2024 7:53 AM CST  To: Kanika Molina Clinical Staff  Subject: nausous     Will you send new script to [facility] ?   And it will probably get denied   They are requiring a new PA  Thanks   Rashida

## 2024-02-21 NOTE — TELEPHONE ENCOUNTER
If feeling better then can get refill with PCP and follow with them  Otherwise if needs refill need follow up

## 2024-02-21 NOTE — TELEPHONE ENCOUNTER
Requested Prescriptions     Pending Prescriptions Disp Refills    FAMOTIDINE 20 MG Oral Tab [Pharmacy Med Name: Famotidine 20 Mg Tab Teva] 90 tablet 0     Sig: Take 1 tablet (20 mg total) by mouth daily as needed.        LOV    1/14/2020 - Telemedicine 7/15/2020    LR   2/19/2023         Spoke with the patient to set up an office visit since it has been since 2020 explained that the providers prefer the patient to be seen yearly but the patient stated that she gets the refill from her PCP and Dr Thomas -and was asking if she needed a follow up appointment and if I could ask the provider I said that I would ask the provider and call the patient back         sb

## 2024-02-29 ENCOUNTER — LAB ENCOUNTER (OUTPATIENT)
Dept: LAB | Age: 64
End: 2024-02-29
Attending: INTERNAL MEDICINE
Payer: COMMERCIAL

## 2024-02-29 DIAGNOSIS — Z51.81 THERAPEUTIC DRUG MONITORING: ICD-10-CM

## 2024-02-29 LAB
ALBUMIN SERPL-MCNC: 4.6 G/DL (ref 3.2–4.8)
ALBUMIN/GLOB SERPL: 1.6 {RATIO} (ref 1–2)
ALP LIVER SERPL-CCNC: 73 U/L
ALT SERPL-CCNC: 11 U/L
ANION GAP SERPL CALC-SCNC: 7 MMOL/L (ref 0–18)
AST SERPL-CCNC: 16 U/L (ref ?–34)
BASOPHILS # BLD AUTO: 0.03 X10(3) UL (ref 0–0.2)
BASOPHILS NFR BLD AUTO: 0.5 %
BILIRUB SERPL-MCNC: 0.8 MG/DL (ref 0.2–1.1)
BUN BLD-MCNC: 14 MG/DL (ref 9–23)
BUN/CREAT SERPL: 18.4 (ref 10–20)
CALCIUM BLD-MCNC: 9.9 MG/DL (ref 8.7–10.4)
CHLORIDE SERPL-SCNC: 106 MMOL/L (ref 98–112)
CO2 SERPL-SCNC: 28 MMOL/L (ref 21–32)
CREAT BLD-MCNC: 0.76 MG/DL
DEPRECATED RDW RBC AUTO: 43 FL (ref 35.1–46.3)
EGFRCR SERPLBLD CKD-EPI 2021: 88 ML/MIN/1.73M2 (ref 60–?)
EOSINOPHIL # BLD AUTO: 0.09 X10(3) UL (ref 0–0.7)
EOSINOPHIL NFR BLD AUTO: 1.6 %
ERYTHROCYTE [DISTWIDTH] IN BLOOD BY AUTOMATED COUNT: 13.4 % (ref 11–15)
FASTING STATUS PATIENT QL REPORTED: YES
GLOBULIN PLAS-MCNC: 2.9 G/DL (ref 2.8–4.4)
GLUCOSE BLD-MCNC: 78 MG/DL (ref 70–99)
HCT VFR BLD AUTO: 42.7 %
HGB BLD-MCNC: 13.7 G/DL
IMM GRANULOCYTES # BLD AUTO: 0 X10(3) UL (ref 0–1)
IMM GRANULOCYTES NFR BLD: 0 %
LIPASE SERPL-CCNC: 31 U/L (ref 12–53)
LYMPHOCYTES # BLD AUTO: 1.43 X10(3) UL (ref 1–4)
LYMPHOCYTES NFR BLD AUTO: 26 %
MCH RBC QN AUTO: 28.1 PG (ref 26–34)
MCHC RBC AUTO-ENTMCNC: 32.1 G/DL (ref 31–37)
MCV RBC AUTO: 87.5 FL
MONOCYTES # BLD AUTO: 0.45 X10(3) UL (ref 0.1–1)
MONOCYTES NFR BLD AUTO: 8.2 %
NEUTROPHILS # BLD AUTO: 3.49 X10 (3) UL (ref 1.5–7.7)
NEUTROPHILS # BLD AUTO: 3.49 X10(3) UL (ref 1.5–7.7)
NEUTROPHILS NFR BLD AUTO: 63.7 %
OSMOLALITY SERPL CALC.SUM OF ELEC: 291 MOSM/KG (ref 275–295)
PLATELET # BLD AUTO: 235 10(3)UL (ref 150–450)
POTASSIUM SERPL-SCNC: 3.4 MMOL/L (ref 3.5–5.1)
PROT SERPL-MCNC: 7.5 G/DL (ref 5.7–8.2)
RBC # BLD AUTO: 4.88 X10(6)UL
SODIUM SERPL-SCNC: 141 MMOL/L (ref 136–145)
WBC # BLD AUTO: 5.5 X10(3) UL (ref 4–11)

## 2024-02-29 PROCEDURE — 80053 COMPREHEN METABOLIC PANEL: CPT

## 2024-02-29 PROCEDURE — 36415 COLL VENOUS BLD VENIPUNCTURE: CPT

## 2024-02-29 PROCEDURE — 85025 COMPLETE CBC W/AUTO DIFF WBC: CPT

## 2024-02-29 PROCEDURE — 83690 ASSAY OF LIPASE: CPT

## 2024-03-05 DIAGNOSIS — K59.00 CONSTIPATION, UNSPECIFIED CONSTIPATION TYPE: ICD-10-CM

## 2024-03-05 DIAGNOSIS — F41.9 ANXIETY AND DEPRESSION: ICD-10-CM

## 2024-03-05 DIAGNOSIS — E66.9 OBESITY, CLASS I, BMI 30-34.9: ICD-10-CM

## 2024-03-05 DIAGNOSIS — F32.A ANXIETY AND DEPRESSION: ICD-10-CM

## 2024-03-05 DIAGNOSIS — E66.9 CLASS 1 OBESITY WITHOUT SERIOUS COMORBIDITY WITH BODY MASS INDEX (BMI) OF 30.0 TO 30.9 IN ADULT, UNSPECIFIED OBESITY TYPE: ICD-10-CM

## 2024-03-05 DIAGNOSIS — Z51.81 THERAPEUTIC DRUG MONITORING: ICD-10-CM

## 2024-03-05 DIAGNOSIS — E66.01 MORBID OBESITY (HCC): ICD-10-CM

## 2024-03-05 RX ORDER — FAMOTIDINE 20 MG/1
20 TABLET, FILM COATED ORAL DAILY PRN
Qty: 90 TABLET | Refills: 0 | OUTPATIENT
Start: 2024-03-05

## 2024-03-06 RX ORDER — PHENTERMINE HYDROCHLORIDE 30 MG/1
30 CAPSULE ORAL EVERY MORNING
Qty: 90 CAPSULE | Refills: 0 | Status: SHIPPED | OUTPATIENT
Start: 2024-03-06 | End: 2024-06-04

## 2024-03-06 RX ORDER — ONDANSETRON 4 MG/1
4 TABLET, ORALLY DISINTEGRATING ORAL EVERY 8 HOURS PRN
Qty: 30 TABLET | Refills: 0 | Status: SHIPPED | OUTPATIENT
Start: 2024-03-06

## 2024-03-06 RX ORDER — LORAZEPAM 1 MG/1
1 TABLET ORAL 2 TIMES DAILY PRN
Qty: 60 TABLET | Refills: 0 | Status: SHIPPED | OUTPATIENT
Start: 2024-03-06

## 2024-03-06 RX ORDER — DOCUSATE SODIUM 100 MG/1
100 CAPSULE, LIQUID FILLED ORAL 2 TIMES DAILY
Qty: 60 CAPSULE | Refills: 2 | Status: SHIPPED | OUTPATIENT
Start: 2024-03-06

## 2024-03-06 NOTE — TELEPHONE ENCOUNTER
A refill request was received for:  Requested Prescriptions     Pending Prescriptions Disp Refills    sertraline 50 MG Oral Tab 90 tablet 3     Sig: Take 1 tablet (50 mg total) by mouth daily.    LORazepam 1 MG Oral Tab 60 tablet 0     Sig: Take 1 tablet (1 mg total) by mouth 2 (two) times daily as needed for Anxiety.    docusate sodium 100 MG Oral Cap 60 capsule 2     Sig: Take 1 capsule (100 mg total) by mouth 2 (two) times daily.    ondansetron 4 MG Oral Tablet Dispersible 30 tablet 0     Sig: Take 1 tablet (4 mg total) by mouth every 8 (eight) hours as needed.    Phentermine HCl 30 MG Oral Cap 90 capsule 0     Sig: Take 1 capsule (30 mg total) by mouth every morning.     Last refill date:  8/8/23    Last office visit: 1/15/24      Future Appointments   Date Time Provider Department Center   4/15/2024  7:00 AM Vimal Quintanilla MD [facility] [address]   7/15/2024  7:00 AM Vimal Quintanilla MD [facility] [address]   8/19/2024  7:00 AM Vimal Quintanilla MD [facility] [address]

## 2024-04-05 RX ORDER — SEMAGLUTIDE 1.7 MG/.75ML
1.7 INJECTION, SOLUTION SUBCUTANEOUS WEEKLY
Qty: 6 ML | Refills: 0 | Status: SHIPPED | OUTPATIENT
Start: 2024-04-05

## 2024-04-05 NOTE — TELEPHONE ENCOUNTER
A refill request was received for:  Requested Prescriptions     Pending Prescriptions Disp Refills    WEGOVY 1.7 MG/0.75ML Subcutaneous Solution Auto-injector [Pharmacy Med Name: Wegovy 1.7mg/dose Pre-Filled Pen Solution for Injection] 6 mL 0     Sig: Inject 1.7 mg under the skin once weekly.     Last refill date:  2/13/24    Last office visit: 1/15/24      Future Appointments   Date Time Provider Department Center   4/15/2024  7:00 AM Vimal Quintanilla MD [facility] [address]   7/15/2024  7:00 AM Vimal Quintanilla MD [facility] [address]   8/19/2024  7:00 AM Vimal Quintanilla MD [facility] [address]

## 2024-04-15 ENCOUNTER — OFFICE VISIT (OUTPATIENT)
Dept: INTERNAL MEDICINE CLINIC | Facility: CLINIC | Age: 64
End: 2024-04-15
Payer: COMMERCIAL

## 2024-04-15 VITALS
WEIGHT: 162.63 LBS | DIASTOLIC BLOOD PRESSURE: 64 MMHG | BODY MASS INDEX: 27.77 KG/M2 | HEART RATE: 80 BPM | HEIGHT: 64 IN | SYSTOLIC BLOOD PRESSURE: 102 MMHG | OXYGEN SATURATION: 98 %

## 2024-04-15 DIAGNOSIS — F41.9 ANXIETY AND DEPRESSION: ICD-10-CM

## 2024-04-15 DIAGNOSIS — Z51.81 THERAPEUTIC DRUG MONITORING: ICD-10-CM

## 2024-04-15 DIAGNOSIS — K21.9 GASTROESOPHAGEAL REFLUX DISEASE, UNSPECIFIED WHETHER ESOPHAGITIS PRESENT: Primary | ICD-10-CM

## 2024-04-15 DIAGNOSIS — R11.2 NAUSEA AND VOMITING, UNSPECIFIED VOMITING TYPE: ICD-10-CM

## 2024-04-15 DIAGNOSIS — E66.01 MORBID OBESITY (HCC): ICD-10-CM

## 2024-04-15 DIAGNOSIS — E65 PANNICULUS: ICD-10-CM

## 2024-04-15 DIAGNOSIS — F32.A ANXIETY AND DEPRESSION: ICD-10-CM

## 2024-04-15 PROCEDURE — 3074F SYST BP LT 130 MM HG: CPT | Performed by: INTERNAL MEDICINE

## 2024-04-15 PROCEDURE — 3078F DIAST BP <80 MM HG: CPT | Performed by: INTERNAL MEDICINE

## 2024-04-15 PROCEDURE — 99214 OFFICE O/P EST MOD 30 MIN: CPT | Performed by: INTERNAL MEDICINE

## 2024-04-15 PROCEDURE — 3008F BODY MASS INDEX DOCD: CPT | Performed by: INTERNAL MEDICINE

## 2024-04-15 RX ORDER — NYSTATIN 100000 [USP'U]/G
1 POWDER TOPICAL 4 TIMES DAILY
Qty: 30 G | Refills: 0 | Status: SHIPPED | OUTPATIENT
Start: 2024-04-15

## 2024-04-15 RX ORDER — NICOTINE POLACRILEX 4 MG/1
20 GUM, CHEWING ORAL EVERY MORNING
Qty: 30 TABLET | Refills: 1 | Status: SHIPPED | OUTPATIENT
Start: 2024-04-15 | End: 2024-05-15

## 2024-04-15 NOTE — PROGRESS NOTES
Enedina Knapp is a 63 year old female.    Chief complaint:weight loss follow up , medication refill, therapeutic drug monitoring    HPI:   ENEDINA here for follow up on weight loss   Wt Readings from Last 12 Encounters:   04/15/24 162 lb 9.6 oz (73.8 kg)   01/15/24 179 lb (81.2 kg)   10/31/23 189 lb 12.8 oz (86.1 kg)   08/15/23 192 lb 12.8 oz (87.5 kg)   07/01/23 188 lb 6.4 oz (85.5 kg)   04/05/23 187 lb 9.6 oz (85.1 kg)   11/07/22 183 lb 6.4 oz (83.2 kg)   08/08/22 188 lb 9.6 oz (85.5 kg)   05/16/22 195 lb 3.2 oz (88.5 kg)   03/15/22 206 lb 12.8 oz (93.8 kg)   12/16/21 216 lb (98 kg)   11/15/21 225 lb 9.6 oz (102.3 kg)     Starting weight: 251 lbs     Total weight loss: 89 lbs   Medication: Yes: wegovy    Typical diet   Breakfast: Lunch: Dinner: Snacks:   Hard boiled eggs   Fruits   Kiwi    0 carb peanut butter wrap Sometimes she is not hungry or portillos beef sandwich      no     Soda/ juice/ alcohol: No  Water intake: adequate  Exercise: Yes: 2-3 times per week   Challenges: no challenges   Side effect of medication: nausea   Score to self ( how well she is doing ):7/10         Excess skin   Causing irritation under the breast and lower abdomen           GERD   Nausea and vomiting in am   Bile   Feels it more on empty stomach   Sometimes 1ce per week and other times can be 3 times per week   + acid reflux feeling   Felt better when she cut down wegovy from 2.4 mg to 1.7       Current Outpatient Medications   Medication Sig Dispense Refill    sertraline 50 MG Oral Tab Take 1 tablet (50 mg total) by mouth daily. 90 tablet 3    ondansetron 4 MG Oral Tablet Dispersible Take 1 tablet (4 mg total) by mouth every 8 (eight) hours as needed. 30 tablet 0    Phentermine HCl 30 MG Oral Cap Take 1 capsule (30 mg total) by mouth every morning. 90 capsule 0    LORazepam 1 MG Oral Tab Take 1 tablet (1 mg total) by mouth 2 (two) times daily as needed for Anxiety. 60 tablet 0    WEGOVY 1.7 MG/0.75ML Subcutaneous Solution  Auto-injector Inject 0.75 mL (1.7 mg total) into the skin once a week.      valACYclovir 1 G Oral Tab 2 tabs bid x 1 day 4 tablet 2    semaglutide-weight management (WEGOVY) 1.7 MG/0.75ML Subcutaneous Solution Auto-injector Inject 0.75 mL (1.7 mg total) into the skin once a week. 6 mL 0    LORazepam 1 MG Oral Tab Take 1 tablet (1 mg total) by mouth 2 (two) times daily as needed for Anxiety. 60 tablet 0    docusate sodium 100 MG Oral Cap Take 1 capsule (100 mg total) by mouth 2 (two) times daily. 60 capsule 2    Meloxicam 7.5 MG Oral Tab Take 1 tablet (7.5 mg total) by mouth 2 (two) times daily as needed for Pain. 60 tablet 0    COVID-19 mRNA Bival Vac Booster (MODERNA COVID-19 BIVALENT) 50 MCG/0.5ML Intramuscular Suspension Inject 0.5 mL (50 mcg total) into the muscle Prior to discharge. 0.5 mL 0    LORazepam 1 MG Oral Tab Take 1 tablet (1 mg total) by mouth 2 (two) times daily as needed for Anxiety. 60 tablet 0    naltrexone 50 MG Oral Tab Take 1 tablet (50 mg total) by mouth daily. 30 tablet 2    famotidine 20 MG Oral Tab Take 1 tablet (20 mg total) by mouth daily as needed. 90 tablet 3    cetirizine 10 MG Oral Tab Take 1 tablet (10 mg total) by mouth every morning. 90 tablet 0    ondansetron 4 MG Oral Tablet Dispersible Take 1 tablet (4 mg total) by mouth every 8 (eight) hours as needed for Nausea. 30 tablet 1    ERGOCALCIFEROL 1.25 MG (67283 UT) Oral Cap TAKE 1 CAPSULE (50,000 UNITS TOTAL) BY MOUTH ONCE A WEEK FOR 12 DOSES 12 capsule 1    FLOWFLEX COVID-19 AG HOME TEST In Vitro Kit FOR PERSONAL USE ONLY. NOT FOR EMPLOYMENT PURPOSES OR FOR RESALE.      clobetasol 0.05 % External Solution Apply 1 mL topically 2 (two) times daily. 25 mL 1    CLOTRIMAZOLE-BETAMETHASONE 1-0.05 % External Cream APPLY 1 APPLICATION TOPICALLY 2 (TWO) TIMES DAILY AS NEEDED. 15 g 1    LORAZEPAM 1 MG Oral Tab TAKE 1 TABLET BY MOUTH TWICE A DAY 60 tablet 0    Pantoprazole Sodium 40 MG Oral Tab EC Take 1 tablet (40 mg total) by mouth every  morning before breakfast. 90 tablet 1    Fluticasone Furoate (FLONASE SENSIMIST) 27.5 MCG/SPRAY Nasal Suspension 2 sprays, one at a time to each nostril x 15 days. 1 each 0    clotrimazole 1 % External Cream APPLY 1 APPLICATION TOPICALLY 2 (TWO) TIMES DAILY FOR 14 DAYS.  1      Past Medical History:    Anxiety    Anxiety and depression    Arthritis    Class 2 obesity with serious comorbidity and body mass index (BMI) of 38.0 to 38.9 in adult    Fibromyalgia    Skin cancer     Past Surgical History:   Procedure Laterality Date          x2    Cholecystectomy  2005    Open elidia.     Electrocardiogram, complete  2013    SCANNED TO MEDIA TAB - 2013    Hysterectomy  2008     partial, No BSO, no abNL paps.     Repair rotator cuff,acute          Social History:  Social History     Socioeconomic History    Marital status:    Tobacco Use    Smoking status: Former     Current packs/day: 0.00     Types: Cigarettes     Start date: 1970     Quit date: 1992     Years since quittin.0    Smokeless tobacco: Never   Substance and Sexual Activity    Alcohol use: No     Alcohol/week: 0.0 standard drinks of alcohol     Comment: Rarely    Drug use: No   Other Topics Concern    Caffeine Concern No        Family History   Problem Relation Age of Onset    Hypertension Mother     Other (Other) Mother         thyroid    Other (Other) Other         hashimoto    Cancer Father     Heart Disorder Maternal Grandfather 52        mi    Heart Disease Maternal Grandfather 52        CAD    Diabetes Maternal Grandfather     Diabetes Maternal Grandmother     Heart Disease Maternal Grandmother         CAD     Patient Active Problem List   Diagnosis    Abnormal results of thyroid function studies    Routine general medical examination at a health care facility    Elevated blood pressure reading    Class 2 obesity with serious comorbidity and body mass index (BMI) of 38.0 to 38.9 in adult    Anxiety and  depression    S/P partial hysterectomy    Morbid obesity (HCC)    Strain of lumbar region    Therapeutic drug monitoring    S/P hysterectomy    Skin cancer    History of skin cancer               REVIEW OF SYSTEMS:   A comprehensive 10 point review of systems was completed.  Pertinent positives and negatives noted in the the HPI          PHYSICAL EXAM:   /64   Pulse 80   Ht 5' 4\" (1.626 m)   Wt 162 lb 9.6 oz (73.8 kg)   SpO2 98%   BMI 27.91 kg/m²   GENERAL: well developed, well nourished,in no apparent distress  LUNGS: clear to auscultation  CARDIO: RRR without murmur  NEURO: no gross deficits              No orders of the defined types were placed in this encounter.        ASSESSMENT/PLAN:       ICD-10-CM    1. Gastroesophageal reflux disease, unspecified whether esophagitis present  K21.9       2. Panniculus  E65 Plastic Surgery Referral - In Network     CANCELED: Plastic Surgery Referral - In Network      3. Morbid obesity (HCC)  E66.01       4. Therapeutic drug monitoring  Z51.81       5. Anxiety and depression  F41.9     F32.A       6. Nausea and vomiting, unspecified vomiting type  R11.2          Plan:  Patient has lost 17 lbs # since LOV  Patient has lost a total weight loss of 89 lbs # since first weight loss consult.  Labs reviewed  Advised to continue with low carb diet   exercise goal is 1 hour 3 times per week cardio and strength training   discussed challenges with weight loss   Weigh once a week at least   Water intake goal is 64 oz per day   Goal weight loss is 8 lbs in 3 months   Cut down the dose of wegovy to 1 mg if available if not to 1.7 mg every 10 days   Starting ppi for 6-8 weeks   If she continues to have symptoms to STop wegovy and refer to GI  Nystatin powder   Referral to plastic surgeon   Continue sertraline   Printed wegovy 1 mg and 1.7 so she can look for which pharmacy is available       Patient feels sick certain day she would like to have FMLA form filled out so she can  leave work or take a day off if nausea or vomiting   She will need it around 3 times per week   Discussed we can for 3 months and will re-evaluate after treatment with PPI      Plan:  Nutrition: low carb diet   Referral RD/nutritionist : no  Behavior:  Motivational interviewing performed      Discussed strategies to overcome habits/challenges       Reviewed:  Nutrition and the importance of regular protein intake  Labs ordered:    no   Importance of physical activity and reducing sedentary time  Treatment plan     Please return to the clinic if you are having persistent or worsening symptoms   Vimal Quintanilla MD,   Diplomate of the American Board of Internal Medicine  Diplomate of the American Board of Obesity Medicine

## 2024-05-16 DIAGNOSIS — K59.00 CONSTIPATION, UNSPECIFIED CONSTIPATION TYPE: ICD-10-CM

## 2024-05-16 DIAGNOSIS — E66.9 OBESITY, CLASS I, BMI 30-34.9: ICD-10-CM

## 2024-05-16 DIAGNOSIS — Z51.81 THERAPEUTIC DRUG MONITORING: ICD-10-CM

## 2024-05-16 DIAGNOSIS — F41.9 ANXIETY AND DEPRESSION: ICD-10-CM

## 2024-05-16 DIAGNOSIS — F32.A ANXIETY AND DEPRESSION: ICD-10-CM

## 2024-05-16 DIAGNOSIS — E66.9 CLASS 1 OBESITY WITHOUT SERIOUS COMORBIDITY WITH BODY MASS INDEX (BMI) OF 30.0 TO 30.9 IN ADULT, UNSPECIFIED OBESITY TYPE: ICD-10-CM

## 2024-05-16 DIAGNOSIS — E66.01 MORBID OBESITY (HCC): ICD-10-CM

## 2024-05-16 RX ORDER — LORAZEPAM 1 MG/1
1 TABLET ORAL 2 TIMES DAILY PRN
Qty: 60 TABLET | Refills: 0 | Status: SHIPPED | OUTPATIENT
Start: 2024-05-16

## 2024-05-16 RX ORDER — ONDANSETRON 4 MG/1
4 TABLET, ORALLY DISINTEGRATING ORAL EVERY 8 HOURS PRN
Qty: 30 TABLET | Refills: 0 | Status: SHIPPED | OUTPATIENT
Start: 2024-05-16

## 2024-05-16 RX ORDER — PHENTERMINE HYDROCHLORIDE 30 MG/1
30 CAPSULE ORAL EVERY MORNING
Qty: 90 CAPSULE | Refills: 0 | Status: SHIPPED | OUTPATIENT
Start: 2024-05-16 | End: 2024-08-14

## 2024-05-16 NOTE — TELEPHONE ENCOUNTER
A refill request was received for:  Requested Prescriptions     Pending Prescriptions Disp Refills    sertraline 50 MG Oral Tab 90 tablet 3     Sig: Take 1 tablet (50 mg total) by mouth daily.    LORazepam 1 MG Oral Tab 60 tablet 0     Sig: Take 1 tablet (1 mg total) by mouth 2 (two) times daily as needed for Anxiety.    ondansetron 4 MG Oral Tablet Dispersible 30 tablet 0     Sig: Take 1 tablet (4 mg total) by mouth every 8 (eight) hours as needed.    Phentermine HCl 30 MG Oral Cap 90 capsule 0     Sig: Take 1 capsule (30 mg total) by mouth every morning.     Last refill date:  3/6/24    Last office visit: 4/15/24      Future Appointments   Date Time Provider Department Center   8/19/2024  7:00 AM Vimal Quintanilla MD [facility] [address]

## 2024-06-03 DIAGNOSIS — E66.1 CLASS 2 DRUG-INDUCED OBESITY IN ADULT, UNSPECIFIED BMI, UNSPECIFIED WHETHER SERIOUS COMORBIDITY PRESENT: ICD-10-CM

## 2024-06-03 DIAGNOSIS — F41.9 ANXIETY: ICD-10-CM

## 2024-06-03 DIAGNOSIS — Z51.81 THERAPEUTIC DRUG MONITORING: ICD-10-CM

## 2024-06-03 NOTE — TELEPHONE ENCOUNTER
A refill request was received for:  Requested Prescriptions     Pending Prescriptions Disp Refills    WEGOVY 1.7 MG/0.75ML Subcutaneous Solution Auto-injector [Pharmacy Med Name: Wegovy 1.7mg/dose Pre-Filled Pen Solution for Injection] 6 mL 0     Sig: Inject 0.75 mL (1.7 mg total) under the skin once a week.     Last refill date:  4/15/24    Last office visit: 4/5/24      Future Appointments   Date Time Provider Department Center   8/19/2024  7:00 AM Vimal Quintanilla MD [facility] [address]

## 2024-06-04 RX ORDER — SEMAGLUTIDE 1.7 MG/.75ML
1.7 INJECTION, SOLUTION SUBCUTANEOUS WEEKLY
Qty: 6 ML | Refills: 0 | Status: SHIPPED | OUTPATIENT
Start: 2024-06-04

## 2024-06-04 NOTE — TELEPHONE ENCOUNTER
MEDICATION REFILL REQUEST:    Future Appointment: 08/19/2024    Last appointment: 04/15/2024    Medication requested:   Requested Prescriptions     Pending Prescriptions Disp Refills    BUPROPION  MG Oral Tablet 24 Hr [Pharmacy Med Name: Bupropion Hydrochloride Er (Xl) 24hr 150 Mg Tab Epic] 90 tablet 0     Sig: TAKE ONE TABLET BY MOUTH ONE TIME DAILY         Last refill date:    Disp Refills Start End    buPROPion  MG Oral Tablet 24 Hr 90 tablet 1 5/18/2023 8/16/2023    Sig - Route: Take 1 tablet (150 mg total) by mouth daily. - Oral    Sent to pharmacy as: buPROPion HCl ER (XL) 150 MG Oral Tablet Extended Release 24 Hour (Wellbutrin XL)    E-Prescribing Status: Receipt confirmed by pharmacy (5/18/2023  9:43 AM CDT)        Protocol Status:     Psychiatric Non-Scheduled (Anti-Anxiety) [id number]/03/2024 01:51 PM   Protocol Details Depression Screening completed within the past 12 months    In person appointment or virtual visit in the past 6 mos or appointment in next 3 mos

## 2024-06-05 RX ORDER — BUPROPION HYDROCHLORIDE 150 MG/1
150 TABLET ORAL DAILY
Qty: 90 TABLET | Refills: 0 | Status: SHIPPED | OUTPATIENT
Start: 2024-06-05

## 2024-06-24 DIAGNOSIS — F32.A ANXIETY AND DEPRESSION: ICD-10-CM

## 2024-06-24 DIAGNOSIS — F41.9 ANXIETY AND DEPRESSION: ICD-10-CM

## 2024-06-24 DIAGNOSIS — Z51.81 THERAPEUTIC DRUG MONITORING: ICD-10-CM

## 2024-06-24 DIAGNOSIS — E66.9 CLASS 1 OBESITY WITHOUT SERIOUS COMORBIDITY WITH BODY MASS INDEX (BMI) OF 30.0 TO 30.9 IN ADULT, UNSPECIFIED OBESITY TYPE: ICD-10-CM

## 2024-06-24 DIAGNOSIS — K59.00 CONSTIPATION, UNSPECIFIED CONSTIPATION TYPE: ICD-10-CM

## 2024-06-24 DIAGNOSIS — E66.01 MORBID OBESITY (HCC): ICD-10-CM

## 2024-06-25 NOTE — TELEPHONE ENCOUNTER
MEDICATION REFILL REQUEST:    Future Appointment: 08/19/2024    Last appointment: 04/15/2024    Medication requested:   Requested Prescriptions     Pending Prescriptions Disp Refills    LORazepam 1 MG Oral Tab 60 tablet 0     Sig: Take 1 tablet (1 mg total) by mouth 2 (two) times daily as needed for Anxiety.    ondansetron 4 MG Oral Tablet Dispersible 30 tablet 0     Sig: Take 1 tablet (4 mg total) by mouth every 8 (eight) hours as needed.    Phentermine HCl 30 MG Oral Cap 90 capsule 0     Sig: Take 1 capsule (30 mg total) by mouth every morning.         Last refill date:    Disp Refills Start End    LORazepam 1 MG Oral Tab 60 tablet 0 5/16/2024 --    Sig - Route: Take 1 tablet (1 mg total) by mouth 2 (two) times daily as needed for Anxiety. - Oral    Sent to pharmacy as: LORazepam 1 MG Oral Tablet (Ativan)    E-Prescribing Status: Receipt confirmed by pharmacy (5/16/2024  7:11 PM CDT)       Disp Refills Start End    ondansetron 4 MG Oral Tablet Dispersible 30 tablet 0 5/16/2024 --    Sig - Route: Take 1 tablet (4 mg total) by mouth every 8 (eight) hours as needed. - Oral    Sent to pharmacy as: Ondansetron 4 MG Oral Tablet Disintegrating (Zofran-ODT)    E-Prescribing Status: Receipt confirmed by pharmacy (5/16/2024  7:11 PM CDT)      Phentermine HCl 30 MG Oral Cap 90 capsule 0 5/16/2024 8/14/2024    Sig - Route: Take 1 capsule (30 mg total) by mouth every morning. - Oral    Sent to pharmacy as: Phentermine HCl 30 MG Oral Capsule    E-Prescribing Status: Receipt confirmed by pharmacy (5/16/2024  7:11 PM CDT)

## 2024-06-26 RX ORDER — ONDANSETRON 4 MG/1
4 TABLET, ORALLY DISINTEGRATING ORAL EVERY 8 HOURS PRN
Qty: 30 TABLET | Refills: 0 | Status: SHIPPED | OUTPATIENT
Start: 2024-06-26

## 2024-06-26 RX ORDER — PHENTERMINE HYDROCHLORIDE 30 MG/1
30 CAPSULE ORAL EVERY MORNING
Qty: 90 CAPSULE | Refills: 0 | Status: SHIPPED | OUTPATIENT
Start: 2024-06-26 | End: 2024-09-24

## 2024-06-26 RX ORDER — LORAZEPAM 1 MG/1
1 TABLET ORAL 2 TIMES DAILY PRN
Qty: 60 TABLET | Refills: 0 | Status: SHIPPED | OUTPATIENT
Start: 2024-06-26

## 2024-06-27 ENCOUNTER — TELEPHONE (OUTPATIENT)
Dept: INTERNAL MEDICINE CLINIC | Facility: CLINIC | Age: 64
End: 2024-06-27

## 2024-06-27 NOTE — TELEPHONE ENCOUNTER
Candice called on behalf of her daughter, Wendy, (age 39).    Daughter Wendy needs a Bariatric specialist ASAP;  Wendy has a new patient appointment scheduled for tone., 11/19/2024 @ 8;40am.    Can Dr. Quintanilla recommend a Bariatric physician who may be able to see Wendy earlier than November?  Wendy will have [employer] HMO as of August 1st.    Please call Mom, Candice, with advice for her daughter:    [phone]     KG

## 2024-06-27 NOTE — TELEPHONE ENCOUNTER
Spoke with patient. She would like Dr. Quintanilla to recommend a doctor that can seen her daughter, Wendy sooner that November. She would like the doctor to be both PCP and Bariatrics.

## 2024-07-22 NOTE — TELEPHONE ENCOUNTER
A refill request was received for:  Requested Prescriptions     Pending Prescriptions Disp Refills    WEGOVY 1.7 MG/0.75ML Subcutaneous Solution Auto-injector [Pharmacy Med Name: Wegovy 1.7mg/dose Pre-Filled Pen Solution for Injection] 6 mL 0     Sig: Inject 0.75 mL (1.7 mg total) under the skin once a week.     Last refill date:  6/4/24    Last office visit: 4/15/24      Future Appointments   Date Time Provider Department Center   8/19/2024  7:00 AM Vimal Quintanilla MD [facility] [address]

## 2024-07-23 RX ORDER — SEMAGLUTIDE 1.7 MG/.75ML
1.7 INJECTION, SOLUTION SUBCUTANEOUS WEEKLY
Qty: 6 ML | Refills: 0 | Status: SHIPPED | OUTPATIENT
Start: 2024-07-23

## 2024-08-19 ENCOUNTER — OFFICE VISIT (OUTPATIENT)
Dept: INTERNAL MEDICINE CLINIC | Facility: CLINIC | Age: 64
End: 2024-08-19
Payer: COMMERCIAL

## 2024-08-19 VITALS
HEART RATE: 77 BPM | WEIGHT: 161 LBS | BODY MASS INDEX: 27.49 KG/M2 | DIASTOLIC BLOOD PRESSURE: 60 MMHG | SYSTOLIC BLOOD PRESSURE: 120 MMHG | OXYGEN SATURATION: 98 % | HEIGHT: 64 IN

## 2024-08-19 DIAGNOSIS — Z78.0 ASYMPTOMATIC MENOPAUSE: ICD-10-CM

## 2024-08-19 DIAGNOSIS — L98.7 EXCESS SKIN: ICD-10-CM

## 2024-08-19 DIAGNOSIS — E66.01 MORBID OBESITY (HCC): ICD-10-CM

## 2024-08-19 DIAGNOSIS — Z12.31 ENCOUNTER FOR SCREENING MAMMOGRAM FOR MALIGNANT NEOPLASM OF BREAST: ICD-10-CM

## 2024-08-19 DIAGNOSIS — Z00.00 ANNUAL PHYSICAL EXAM: Primary | ICD-10-CM

## 2024-08-19 LAB
ALBUMIN SERPL-MCNC: 4.4 G/DL (ref 3.2–4.8)
ALBUMIN/GLOB SERPL: 1.6 {RATIO} (ref 1–2)
ALP LIVER SERPL-CCNC: 76 U/L
ALT SERPL-CCNC: 15 U/L
ANION GAP SERPL CALC-SCNC: 6 MMOL/L (ref 0–18)
AST SERPL-CCNC: 18 U/L (ref ?–34)
BASOPHILS # BLD AUTO: 0.04 X10(3) UL (ref 0–0.2)
BASOPHILS NFR BLD AUTO: 0.8 %
BILIRUB SERPL-MCNC: 0.6 MG/DL (ref 0.2–1.1)
BILIRUB UR QL: NEGATIVE
BUN BLD-MCNC: 15 MG/DL (ref 9–23)
BUN/CREAT SERPL: 18.5 (ref 10–20)
CALCIUM BLD-MCNC: 9.5 MG/DL (ref 8.7–10.4)
CHLORIDE SERPL-SCNC: 109 MMOL/L (ref 98–112)
CHOLEST SERPL-MCNC: 234 MG/DL (ref ?–200)
CO2 SERPL-SCNC: 27 MMOL/L (ref 21–32)
COLOR UR: YELLOW
CREAT BLD-MCNC: 0.81 MG/DL
DEPRECATED RDW RBC AUTO: 44.1 FL (ref 35.1–46.3)
EGFRCR SERPLBLD CKD-EPI 2021: 81 ML/MIN/1.73M2 (ref 60–?)
EOSINOPHIL # BLD AUTO: 0.14 X10(3) UL (ref 0–0.7)
EOSINOPHIL NFR BLD AUTO: 2.9 %
ERYTHROCYTE [DISTWIDTH] IN BLOOD BY AUTOMATED COUNT: 13.4 % (ref 11–15)
EST. AVERAGE GLUCOSE BLD GHB EST-MCNC: 100 MG/DL (ref 68–126)
FASTING PATIENT LIPID ANSWER: YES
FASTING STATUS PATIENT QL REPORTED: YES
GLOBULIN PLAS-MCNC: 2.7 G/DL (ref 2–3.5)
GLUCOSE BLD-MCNC: 73 MG/DL (ref 70–99)
GLUCOSE UR-MCNC: NORMAL MG/DL
HBA1C MFR BLD: 5.1 % (ref ?–5.7)
HCT VFR BLD AUTO: 39.1 %
HDLC SERPL-MCNC: 68 MG/DL (ref 40–59)
HGB BLD-MCNC: 13 G/DL
HGB UR QL STRIP.AUTO: NEGATIVE
IMM GRANULOCYTES # BLD AUTO: 0 X10(3) UL (ref 0–1)
IMM GRANULOCYTES NFR BLD: 0 %
KETONES UR-MCNC: NEGATIVE MG/DL
LDLC SERPL CALC-MCNC: 147 MG/DL (ref ?–100)
LEUKOCYTE ESTERASE UR QL STRIP.AUTO: NEGATIVE
LYMPHOCYTES # BLD AUTO: 1.38 X10(3) UL (ref 1–4)
LYMPHOCYTES NFR BLD AUTO: 28.8 %
MCH RBC QN AUTO: 29.7 PG (ref 26–34)
MCHC RBC AUTO-ENTMCNC: 33.2 G/DL (ref 31–37)
MCV RBC AUTO: 89.3 FL
MONOCYTES # BLD AUTO: 0.36 X10(3) UL (ref 0.1–1)
MONOCYTES NFR BLD AUTO: 7.5 %
NEUTROPHILS # BLD AUTO: 2.87 X10 (3) UL (ref 1.5–7.7)
NEUTROPHILS # BLD AUTO: 2.87 X10(3) UL (ref 1.5–7.7)
NEUTROPHILS NFR BLD AUTO: 60 %
NONHDLC SERPL-MCNC: 166 MG/DL (ref ?–130)
OSMOLALITY SERPL CALC.SUM OF ELEC: 293 MOSM/KG (ref 275–295)
PH UR: 6 [PH] (ref 5–8)
PLATELET # BLD AUTO: 222 10(3)UL (ref 150–450)
POTASSIUM SERPL-SCNC: 4.2 MMOL/L (ref 3.5–5.1)
PROT SERPL-MCNC: 7.1 G/DL (ref 5.7–8.2)
PROT UR-MCNC: NEGATIVE MG/DL
RBC # BLD AUTO: 4.38 X10(6)UL
SODIUM SERPL-SCNC: 142 MMOL/L (ref 136–145)
SP GR UR STRIP: 1.02 (ref 1–1.03)
TRIGL SERPL-MCNC: 106 MG/DL (ref 30–149)
TSI SER-ACNC: 1.04 MIU/ML (ref 0.55–4.78)
UROBILINOGEN UR STRIP-ACNC: NORMAL
VIT D+METAB SERPL-MCNC: 36.6 NG/ML (ref 30–100)
VLDLC SERPL CALC-MCNC: 20 MG/DL (ref 0–30)
WBC # BLD AUTO: 4.8 X10(3) UL (ref 4–11)

## 2024-08-19 PROCEDURE — 87086 URINE CULTURE/COLONY COUNT: CPT | Performed by: INTERNAL MEDICINE

## 2024-08-19 PROCEDURE — 82306 VITAMIN D 25 HYDROXY: CPT | Performed by: INTERNAL MEDICINE

## 2024-08-19 PROCEDURE — 80061 LIPID PANEL: CPT | Performed by: INTERNAL MEDICINE

## 2024-08-19 PROCEDURE — 85025 COMPLETE CBC W/AUTO DIFF WBC: CPT | Performed by: INTERNAL MEDICINE

## 2024-08-19 PROCEDURE — 87088 URINE BACTERIA CULTURE: CPT | Performed by: INTERNAL MEDICINE

## 2024-08-19 PROCEDURE — 87186 SC STD MICRODIL/AGAR DIL: CPT | Performed by: INTERNAL MEDICINE

## 2024-08-19 PROCEDURE — 84443 ASSAY THYROID STIM HORMONE: CPT | Performed by: INTERNAL MEDICINE

## 2024-08-19 PROCEDURE — 80053 COMPREHEN METABOLIC PANEL: CPT | Performed by: INTERNAL MEDICINE

## 2024-08-19 PROCEDURE — 83036 HEMOGLOBIN GLYCOSYLATED A1C: CPT | Performed by: INTERNAL MEDICINE

## 2024-08-19 PROCEDURE — 81001 URINALYSIS AUTO W/SCOPE: CPT | Performed by: INTERNAL MEDICINE

## 2024-08-19 RX ORDER — TIRZEPATIDE 5 MG/.5ML
5 INJECTION, SOLUTION SUBCUTANEOUS WEEKLY
Qty: 2 ML | Refills: 0 | Status: SHIPPED | OUTPATIENT
Start: 2024-08-19 | End: 2024-09-10

## 2024-08-19 RX ORDER — TIRZEPATIDE 2.5 MG/.5ML
2.5 INJECTION, SOLUTION SUBCUTANEOUS WEEKLY
Qty: 2 ML | Refills: 0 | Status: SHIPPED | OUTPATIENT
Start: 2024-08-19 | End: 2024-09-10

## 2024-08-19 NOTE — PROGRESS NOTES
Candice Knapp is a 64 year old female.    Chief complaint: annual physical exam       HPI:     Candice Knapp is a 64 year old pleasant female who presents for annual physical exam       No chest pain no sob no abdominal pain  No diarrhea or constipation   No fever or chills   No urinary complaints        No smoking   Smoked for 23 plus years around a pack per day  Stopped 25 years ago   Alcohol sometimes   Exercise : walking and exercise   Family history of cancer : father lung cancer     Current Outpatient Medications   Medication Sig Dispense Refill    Tirzepatide-Weight Management (ZEPBOUND) 2.5 MG/0.5ML Subcutaneous Solution Auto-injector Inject 2.5 mg into the skin once a week for 4 doses. 2 mL 0    Tirzepatide-Weight Management (ZEPBOUND) 5 MG/0.5ML Subcutaneous Solution Auto-injector Inject 5 mg into the skin once a week for 4 doses. 2 mL 0    WEGOVY 1.7 MG/0.75ML Subcutaneous Solution Auto-injector Inject 0.75 mL (1.7 mg total) under the skin once a week. 6 mL 0    LORazepam 1 MG Oral Tab Take 1 tablet (1 mg total) by mouth 2 (two) times daily as needed for Anxiety. 60 tablet 0    ondansetron 4 MG Oral Tablet Dispersible Take 1 tablet (4 mg total) by mouth every 8 (eight) hours as needed. 30 tablet 0    Phentermine HCl 30 MG Oral Cap Take 1 capsule (30 mg total) by mouth every morning. 90 capsule 0    BUPROPION  MG Oral Tablet 24 Hr TAKE ONE TABLET BY MOUTH ONE TIME DAILY 90 tablet 0    sertraline 50 MG Oral Tab Take 1 tablet (50 mg total) by mouth daily. 90 tablet 3    Nystatin 321692 UNIT/GM External Powder Apply 1 Application topically 4 (four) times daily. (Patient not taking: Reported on 4/15/2024) 30 g 0    docusate sodium 100 MG Oral Cap Take 1 capsule (100 mg total) by mouth 2 (two) times daily. (Patient not taking: Reported on 4/15/2024) 60 capsule 2    Meloxicam 7.5 MG Oral Tab Take 1 tablet (7.5 mg total) by mouth 2 (two) times daily as needed for Pain. (Patient not taking: Reported  on 4/15/2024) 60 tablet 0    COVID-19 mRNA Bival Vac Booster (MODERNA COVID-19 BIVALENT) 50 MCG/0.5ML Intramuscular Suspension Inject 0.5 mL (50 mcg total) into the muscle Prior to discharge. (Patient not taking: Reported on 4/15/2024) 0.5 mL 0    LORazepam 1 MG Oral Tab Take 1 tablet (1 mg total) by mouth 2 (two) times daily as needed for Anxiety. 60 tablet 0    LORazepam 1 MG Oral Tab Take 1 tablet (1 mg total) by mouth 2 (two) times daily as needed for Anxiety. (Patient not taking: Reported on 4/15/2024) 60 tablet 0    WEGOVY 1.7 MG/0.75ML Subcutaneous Solution Auto-injector Inject 0.75 mL (1.7 mg total) into the skin once a week.      naltrexone 50 MG Oral Tab Take 1 tablet (50 mg total) by mouth daily. (Patient not taking: Reported on 4/15/2024) 30 tablet 2    valACYclovir 1 G Oral Tab 2 tabs bid x 1 day 4 tablet 2    famotidine 20 MG Oral Tab Take 1 tablet (20 mg total) by mouth daily as needed. (Patient not taking: Reported on 4/15/2024) 90 tablet 3    cetirizine 10 MG Oral Tab Take 1 tablet (10 mg total) by mouth every morning. (Patient not taking: Reported on 4/15/2024) 90 tablet 0    ondansetron 4 MG Oral Tablet Dispersible Take 1 tablet (4 mg total) by mouth every 8 (eight) hours as needed for Nausea. (Patient not taking: Reported on 4/15/2024) 30 tablet 1    ERGOCALCIFEROL 1.25 MG (89056 UT) Oral Cap TAKE 1 CAPSULE (50,000 UNITS TOTAL) BY MOUTH ONCE A WEEK FOR 12 DOSES (Patient not taking: Reported on 4/15/2024) 12 capsule 1    FLOWFLEX COVID-19 AG HOME TEST In Vitro Kit FOR PERSONAL USE ONLY. NOT FOR EMPLOYMENT PURPOSES OR FOR RESALE. (Patient not taking: Reported on 4/15/2024)      clobetasol 0.05 % External Solution Apply 1 mL topically 2 (two) times daily. (Patient not taking: Reported on 4/15/2024) 25 mL 1    CLOTRIMAZOLE-BETAMETHASONE 1-0.05 % External Cream APPLY 1 APPLICATION TOPICALLY 2 (TWO) TIMES DAILY AS NEEDED. (Patient not taking: Reported on 4/15/2024) 15 g 1    LORAZEPAM 1 MG Oral Tab  TAKE 1 TABLET BY MOUTH TWICE A DAY (Patient not taking: Reported on 4/15/2024) 60 tablet 0    Pantoprazole Sodium 40 MG Oral Tab EC Take 1 tablet (40 mg total) by mouth every morning before breakfast. (Patient not taking: Reported on 4/15/2024) 90 tablet 1    Fluticasone Furoate (FLONASE SENSIMIST) 27.5 MCG/SPRAY Nasal Suspension 2 sprays, one at a time to each nostril x 15 days. (Patient not taking: Reported on 4/15/2024) 1 each 0    clotrimazole 1 % External Cream APPLY 1 APPLICATION TOPICALLY 2 (TWO) TIMES DAILY FOR 14 DAYS. (Patient not taking: Reported on 4/15/2024)  1      Past Medical History:    Anxiety    Anxiety and depression    Arthritis    Class 2 obesity with serious comorbidity and body mass index (BMI) of 38.0 to 38.9 in adult    Fibromyalgia    Gastroesophageal reflux disease    Skin cancer     Past Surgical History:   Procedure Laterality Date          x2    Cholecystectomy  2005    Open elidia.     Electrocardiogram, complete  2013    SCANNED TO MEDIA TAB - 2013    Hysterectomy  2008     partial, No BSO, no abNL paps.     Repair rotator cuff,acute               Family History   Problem Relation Age of Onset    Hypertension Mother     Other (Other) Mother         thyroid    Other (Other) Other         hashimoto    Cancer Father     Heart Disorder Maternal Grandfather 52        mi    Heart Disease Maternal Grandfather 52        CAD    Diabetes Maternal Grandfather     Diabetes Maternal Grandmother     Heart Disease Maternal Grandmother         CAD     Patient Active Problem List   Diagnosis    Abnormal results of thyroid function studies    Routine general medical examination at a health care facility    Elevated blood pressure reading    Class 2 obesity with serious comorbidity and body mass index (BMI) of 38.0 to 38.9 in adult    Anxiety and depression    S/P partial hysterectomy    Morbid obesity (HCC)    Strain of lumbar region    Therapeutic drug monitoring    S/P  hysterectomy    Skin cancer    History of skin cancer    Gastroesophageal reflux disease    Nausea and vomiting       REVIEW OF SYSTEMS:   A comprehensive 10 point review of systems was completed.  Pertinent positives and negatives noted in the the HPI            EXAM:   /60   Pulse 77   Ht 5' 4\" (1.626 m)   Wt 161 lb (73 kg)   SpO2 98%   BMI 27.64 kg/m²   GENERAL: well developed, well nourished,in no apparent distress  SKIN: multiple pigmented nevi   HEENT: atraumatic, normocephalic,ears and throat are clear  NECK: supple,no adenopathy  Breast : normal no lumps  LUNGS: clear to auscultation  CARDIO: RRR without murmur  GI: no masses, HSM or tenderness  EXTREMITIES: no cyanosis, clubbing or edema  NEURO: no gross deficits              Orders Placed This Encounter    CBC With Differential With Platelet     Standing Status:   Future     Standing Expiration Date:   8/19/2025    Comp Metabolic Panel (14)     Standing Status:   Future     Standing Expiration Date:   8/19/2025    Lipid Panel     Standing Status:   Future     Standing Expiration Date:   8/19/2025    Hemoglobin A1C     Standing Status:   Future     Standing Expiration Date:   8/19/2025    TSH W Reflex To Free T4     Standing Status:   Future     Standing Expiration Date:   8/19/2025    Vitamin D     Standing Status:   Future     Standing Expiration Date:   8/19/2025     Order Specific Question:   Please pick the scenario that best fits the purpose for ordering this test     Answer:   General Screening/Vit D deficiency (25-Hydroxy)     Order Specific Question:   Release to patient     Answer:   Immediate    Urinalysis with Culture Reflex [E]     Standing Status:   Future     Standing Expiration Date:   8/19/2025     Order Specific Question:   Release to patient     Answer:   Immediate    Tirzepatide-Weight Management (ZEPBOUND) 2.5 MG/0.5ML Subcutaneous Solution Auto-injector     Sig: Inject 2.5 mg into the skin once a week for 4 doses.      Dispense:  2 mL     Refill:  0    Tirzepatide-Weight Management (ZEPBOUND) 5 MG/0.5ML Subcutaneous Solution Auto-injector     Sig: Inject 5 mg into the skin once a week for 4 doses.     Dispense:  2 mL     Refill:  0     No results found.         ASSESSMENT AND PLAN:       ICD-10-CM    1. Annual physical exam  Z00.00 CBC With Differential With Platelet     Comp Metabolic Panel (14)     Lipid Panel     Hemoglobin A1C     TSH W Reflex To Free T4     Vitamin D     Urinalysis with Culture Reflex [E]     XR DEXA BONE DENSITOMETRY (CPT=77080)     Plastic Surgery Referral - In Network      2. Encounter for screening mammogram for malignant neoplasm of breast  Z12.31 [facility] FELECIA 2D+3D SCREENING BILAT (CPT=77067/01107)     CBC With Differential With Platelet     Comp Metabolic Panel (14)     Lipid Panel     Hemoglobin A1C     TSH W Reflex To Free T4     Vitamin D     Urinalysis with Culture Reflex [E]     XR DEXA BONE DENSITOMETRY (CPT=77080)     Plastic Surgery Referral - In Network      3. Asymptomatic menopause  Z78.0       4. Morbid obesity (HCC)  E66.01       5. Excess skin  L98.7          Diet and exercise   Self breast exam   Sun screen recommended   Fasting blood work   Pap up to date   Up to date with mammogram   Up to date with colonoscopy  Switching wegovy to zepbound since not tolerating well   Referral to plastic for excess skin  Diesnt qualify for r CT lung screen since stopped 25 years ago        Please return to the clinic if you are having persistent symptoms. If worsening symptoms should go to the ER    Vimal Quintanilla MD,   Diplomate of the American Board of Internal Medicine  Diplomate of the American Board of Obesity Medicine

## 2024-08-20 DIAGNOSIS — E66.1 CLASS 2 DRUG-INDUCED OBESITY IN ADULT, UNSPECIFIED BMI, UNSPECIFIED WHETHER SERIOUS COMORBIDITY PRESENT: ICD-10-CM

## 2024-08-20 DIAGNOSIS — F41.9 ANXIETY AND DEPRESSION: ICD-10-CM

## 2024-08-20 DIAGNOSIS — K59.00 CONSTIPATION, UNSPECIFIED CONSTIPATION TYPE: ICD-10-CM

## 2024-08-20 DIAGNOSIS — E66.9 CLASS 1 OBESITY WITHOUT SERIOUS COMORBIDITY WITH BODY MASS INDEX (BMI) OF 30.0 TO 30.9 IN ADULT, UNSPECIFIED OBESITY TYPE: ICD-10-CM

## 2024-08-20 DIAGNOSIS — Z51.81 THERAPEUTIC DRUG MONITORING: ICD-10-CM

## 2024-08-20 DIAGNOSIS — F32.A ANXIETY AND DEPRESSION: ICD-10-CM

## 2024-08-20 DIAGNOSIS — F41.9 ANXIETY: ICD-10-CM

## 2024-08-20 DIAGNOSIS — E66.01 MORBID OBESITY (HCC): ICD-10-CM

## 2024-08-20 DIAGNOSIS — E66.9 OBESITY, CLASS I, BMI 30-34.9: ICD-10-CM

## 2024-08-20 RX ORDER — SULFAMETHOXAZOLE/TRIMETHOPRIM 800-160 MG
1 TABLET ORAL 2 TIMES DAILY
Qty: 6 TABLET | Refills: 0 | Status: SHIPPED | OUTPATIENT
Start: 2024-08-20 | End: 2024-08-23

## 2024-08-20 RX ORDER — SULFAMETHOXAZOLE/TRIMETHOPRIM 800-160 MG
1 TABLET ORAL 2 TIMES DAILY
Qty: 14 TABLET | Refills: 0 | Status: SHIPPED | OUTPATIENT
Start: 2024-08-20 | End: 2024-08-27

## 2024-08-20 NOTE — TELEPHONE ENCOUNTER
A refill request was received for:  Requested Prescriptions     Pending Prescriptions Disp Refills    Nystatin 033886 UNIT/GM External Powder 30 g 0     Sig: Apply 1 Application topically 4 (four) times daily.    sertraline 50 MG Oral Tab 90 tablet 3     Sig: Take 1 tablet (50 mg total) by mouth daily.    buPROPion  MG Oral Tablet 24 Hr 90 tablet 0     Sig: Take 1 tablet (150 mg total) by mouth daily.    LORazepam 1 MG Oral Tab 60 tablet 0     Sig: Take 1 tablet (1 mg total) by mouth 2 (two) times daily as needed for Anxiety.    ondansetron 4 MG Oral Tablet Dispersible 30 tablet 0     Sig: Take 1 tablet (4 mg total) by mouth every 8 (eight) hours as needed.    Phentermine HCl 30 MG Oral Cap 90 capsule 0     Sig: Take 1 capsule (30 mg total) by mouth every morning.     Last refill date:  6/26/24    Last office visit: 8/19/24      Future Appointments   Date Time Provider Department Center   1/20/2025  7:40 AM Vimal Quintanilla MD [facility] [address]

## 2024-08-21 RX ORDER — BUPROPION HYDROCHLORIDE 150 MG/1
150 TABLET ORAL DAILY
Qty: 90 TABLET | Refills: 0 | Status: SHIPPED | OUTPATIENT
Start: 2024-08-21

## 2024-08-21 RX ORDER — LORAZEPAM 1 MG/1
1 TABLET ORAL 2 TIMES DAILY PRN
Qty: 60 TABLET | Refills: 0 | Status: SHIPPED | OUTPATIENT
Start: 2024-08-21

## 2024-08-21 RX ORDER — PHENTERMINE HYDROCHLORIDE 30 MG/1
30 CAPSULE ORAL EVERY MORNING
Qty: 90 CAPSULE | Refills: 0 | Status: SHIPPED | OUTPATIENT
Start: 2024-08-21 | End: 2024-11-19

## 2024-08-21 RX ORDER — NYSTATIN 100000 [USP'U]/G
1 POWDER TOPICAL 4 TIMES DAILY
Qty: 30 G | Refills: 0 | Status: SHIPPED | OUTPATIENT
Start: 2024-08-21

## 2024-08-21 RX ORDER — ONDANSETRON 4 MG/1
4 TABLET, ORALLY DISINTEGRATING ORAL EVERY 8 HOURS PRN
Qty: 30 TABLET | Refills: 0 | Status: SHIPPED | OUTPATIENT
Start: 2024-08-21

## 2024-09-24 ENCOUNTER — PATIENT MESSAGE (OUTPATIENT)
Dept: INTERNAL MEDICINE CLINIC | Facility: CLINIC | Age: 64
End: 2024-09-24

## 2024-09-24 DIAGNOSIS — E66.01 MORBID OBESITY (HCC): Primary | ICD-10-CM

## 2024-09-26 RX ORDER — PHENTERMINE HYDROCHLORIDE 37.5 MG/1
37.5 TABLET ORAL
Qty: 60 TABLET | Refills: 0 | Status: SHIPPED | OUTPATIENT
Start: 2024-09-26 | End: 2024-11-25

## 2024-09-26 NOTE — TELEPHONE ENCOUNTER
Eleonora Madden, [facility] 9/24/2024 10:45 AM CDT    Please advise?  ----- Message -----  From: aCndice Knapp \"Rashida\"  Sent: 9/24/2024 10:08 AM CDT  To: Kanika Molina Clinical Staff  Subject: Phentermine     Was wondering if could increase the dosage on my phentermine   Feeling stressed lately ( work and taking care of my mom ) and find myself anxiety eating then that causes more stress   Rashida

## 2024-10-04 ENCOUNTER — PATIENT MESSAGE (OUTPATIENT)
Dept: INTERNAL MEDICINE CLINIC | Facility: CLINIC | Age: 64
End: 2024-10-04

## 2024-10-07 RX ORDER — TIRZEPATIDE 7.5 MG/.5ML
7.5 INJECTION, SOLUTION SUBCUTANEOUS WEEKLY
Qty: 6 ML | Refills: 0 | Status: SHIPPED | OUTPATIENT
Start: 2024-10-07

## 2024-10-07 NOTE — TELEPHONE ENCOUNTER
Verenice Simental MA 10/5/2024 9:28 AM CDT      ----- Message -----  From: Candice Knapp \"Rashida\"  Sent: 10/4/2024 2:59 PM CDT  To: Kanika Molina Clinical Staff  Subject: Next step     Hi I need the next zepbound script   I just finished 5  Thanks

## 2024-10-30 ENCOUNTER — PATIENT MESSAGE (OUTPATIENT)
Dept: INTERNAL MEDICINE CLINIC | Facility: CLINIC | Age: 64
End: 2024-10-30

## 2024-10-30 RX ORDER — TIRZEPATIDE 10 MG/.5ML
10 INJECTION, SOLUTION SUBCUTANEOUS WEEKLY
Qty: 2 ML | Refills: 0 | Status: SHIPPED | OUTPATIENT
Start: 2024-10-30 | End: 2024-11-21

## 2024-12-02 ENCOUNTER — PATIENT MESSAGE (OUTPATIENT)
Dept: INTERNAL MEDICINE CLINIC | Facility: CLINIC | Age: 64
End: 2024-12-02

## 2024-12-02 DIAGNOSIS — Z51.81 THERAPEUTIC DRUG MONITORING: ICD-10-CM

## 2024-12-02 DIAGNOSIS — E66.811 CLASS 1 OBESITY WITHOUT SERIOUS COMORBIDITY WITH BODY MASS INDEX (BMI) OF 30.0 TO 30.9 IN ADULT, UNSPECIFIED OBESITY TYPE: ICD-10-CM

## 2024-12-02 DIAGNOSIS — K59.00 CONSTIPATION, UNSPECIFIED CONSTIPATION TYPE: ICD-10-CM

## 2024-12-02 DIAGNOSIS — E66.811 OBESITY, CLASS I, BMI 30-34.9: ICD-10-CM

## 2024-12-02 DIAGNOSIS — L25.9 CONTACT DERMATITIS, UNSPECIFIED CONTACT DERMATITIS TYPE, UNSPECIFIED TRIGGER: ICD-10-CM

## 2024-12-02 DIAGNOSIS — E66.812 CLASS 2 DRUG-INDUCED OBESITY IN ADULT, UNSPECIFIED BMI, UNSPECIFIED WHETHER SERIOUS COMORBIDITY PRESENT: ICD-10-CM

## 2024-12-02 DIAGNOSIS — Z13.6 SCREENING FOR HEART DISEASE: ICD-10-CM

## 2024-12-02 DIAGNOSIS — Z12.31 SCREENING MAMMOGRAM, ENCOUNTER FOR: ICD-10-CM

## 2024-12-02 DIAGNOSIS — F41.9 ANXIETY AND DEPRESSION: ICD-10-CM

## 2024-12-02 DIAGNOSIS — E66.01 MORBID OBESITY (HCC): ICD-10-CM

## 2024-12-02 DIAGNOSIS — F32.A ANXIETY AND DEPRESSION: ICD-10-CM

## 2024-12-02 DIAGNOSIS — Z00.00 ANNUAL PHYSICAL EXAM: ICD-10-CM

## 2024-12-02 DIAGNOSIS — F41.9 ANXIETY: ICD-10-CM

## 2024-12-02 DIAGNOSIS — E66.1 CLASS 2 DRUG-INDUCED OBESITY IN ADULT, UNSPECIFIED BMI, UNSPECIFIED WHETHER SERIOUS COMORBIDITY PRESENT: ICD-10-CM

## 2024-12-03 ENCOUNTER — IMMUNIZATION (OUTPATIENT)
Dept: FAMILY MEDICINE CLINIC | Facility: CLINIC | Age: 64
End: 2024-12-03
Payer: COMMERCIAL

## 2024-12-03 DIAGNOSIS — Z23 NEED FOR INFLUENZA VACCINATION: Primary | ICD-10-CM

## 2024-12-03 PROCEDURE — 90471 IMMUNIZATION ADMIN: CPT | Performed by: NURSE PRACTITIONER

## 2024-12-03 PROCEDURE — 90656 IIV3 VACC NO PRSV 0.5 ML IM: CPT | Performed by: NURSE PRACTITIONER

## 2024-12-03 RX ORDER — VALACYCLOVIR HYDROCHLORIDE 1 G/1
TABLET, FILM COATED ORAL
Qty: 4 TABLET | Refills: 2 | Status: SHIPPED | OUTPATIENT
Start: 2024-12-03

## 2024-12-03 RX ORDER — CETIRIZINE HYDROCHLORIDE 10 MG/1
10 TABLET ORAL EVERY MORNING
Qty: 90 TABLET | Refills: 0 | Status: SHIPPED | OUTPATIENT
Start: 2024-12-03

## 2024-12-04 RX ORDER — ONDANSETRON 4 MG/1
4 TABLET, ORALLY DISINTEGRATING ORAL EVERY 8 HOURS PRN
Qty: 30 TABLET | Refills: 0 | Status: SHIPPED | OUTPATIENT
Start: 2024-12-04

## 2024-12-05 RX ORDER — TIRZEPATIDE 10 MG/.5ML
10 INJECTION, SOLUTION SUBCUTANEOUS WEEKLY
Qty: 2 ML | Refills: 0 | Status: SHIPPED | OUTPATIENT
Start: 2024-12-05 | End: 2024-12-27

## 2024-12-05 RX ORDER — LORAZEPAM 1 MG/1
1 TABLET ORAL 2 TIMES DAILY PRN
Qty: 60 TABLET | Refills: 0 | Status: SHIPPED | OUTPATIENT
Start: 2024-12-05

## 2024-12-05 RX ORDER — ONDANSETRON 4 MG/1
4 TABLET, ORALLY DISINTEGRATING ORAL EVERY 8 HOURS PRN
Qty: 30 TABLET | Refills: 0 | Status: SHIPPED | OUTPATIENT
Start: 2024-12-05

## 2024-12-05 RX ORDER — BUPROPION HYDROCHLORIDE 150 MG/1
150 TABLET ORAL DAILY
Qty: 90 TABLET | Refills: 0 | Status: SHIPPED | OUTPATIENT
Start: 2024-12-05

## 2024-12-06 RX ORDER — TIRZEPATIDE 12.5 MG/.5ML
12.5 INJECTION, SOLUTION SUBCUTANEOUS WEEKLY
Qty: 6 ML | Refills: 0 | Status: SHIPPED | OUTPATIENT
Start: 2024-12-06

## 2024-12-11 NOTE — TELEPHONE ENCOUNTER
A refill request was received for:  Requested Prescriptions     Pending Prescriptions Disp Refills    ZEPBOUND 10 MG/0.5ML Subcutaneous Solution Auto-injector [Pharmacy Med Name: Zepbound 10 Mg/0.5ml Inj Lill] 2 mL 0     Sig: Inject 10 mg into the skin once a week for 4 doses.     Last refill date:  12/5/24    Last office visit: 8/19/24      Future Appointments   Date Time Provider Department Center   12/23/2024  4:00 PM [facility] D[facility]A [facility] [place]   1/16/2025  8:00 AM [facility] T[facility]ANCE [facility] [place]   1/20/2025  7:40 AM Vimal Quintanilla MD [facility] [address]

## 2024-12-12 RX ORDER — TIRZEPATIDE 10 MG/.5ML
10 INJECTION, SOLUTION SUBCUTANEOUS WEEKLY
Qty: 2 ML | Refills: 0 | Status: SHIPPED | OUTPATIENT
Start: 2024-12-12 | End: 2025-01-03

## 2025-01-05 NOTE — TELEPHONE ENCOUNTER
Routed to the Surgery Schedulers Take the flomax to help pass the stone, the keflex is for possible infection, the norco is for pain (don't take other sedatives or drive/operate heavy machinery after taking it) and the zofran is for nausea.  Follow-up with urology for the stone and with Soren Fischer given your elevated heart rate.  Make sure you resume taking your medications at home and drink plenty of fluids. Return for fever, severe uncontrolled pain, inability to keep anything down or any other new concerning symptoms.  It has been a pleasure serving you today!  Dr. OSBORN

## 2025-01-20 ENCOUNTER — OFFICE VISIT (OUTPATIENT)
Dept: INTERNAL MEDICINE CLINIC | Facility: CLINIC | Age: 65
End: 2025-01-20
Payer: COMMERCIAL

## 2025-01-20 VITALS
HEART RATE: 74 BPM | SYSTOLIC BLOOD PRESSURE: 138 MMHG | WEIGHT: 149.38 LBS | DIASTOLIC BLOOD PRESSURE: 78 MMHG | HEIGHT: 64 IN | BODY MASS INDEX: 25.5 KG/M2 | OXYGEN SATURATION: 95 %

## 2025-01-20 DIAGNOSIS — E66.9 OBESITY (BMI 30-39.9): ICD-10-CM

## 2025-01-20 DIAGNOSIS — M21.619 BUNION: ICD-10-CM

## 2025-01-20 DIAGNOSIS — Z13.6 SCREENING FOR HEART DISEASE: ICD-10-CM

## 2025-01-20 DIAGNOSIS — F41.9 ANXIETY AND DEPRESSION: ICD-10-CM

## 2025-01-20 DIAGNOSIS — E66.01 MORBID OBESITY (HCC): ICD-10-CM

## 2025-01-20 DIAGNOSIS — E66.811 CLASS 1 OBESITY WITHOUT SERIOUS COMORBIDITY WITH BODY MASS INDEX (BMI) OF 30.0 TO 30.9 IN ADULT, UNSPECIFIED OBESITY TYPE: ICD-10-CM

## 2025-01-20 DIAGNOSIS — F32.A ANXIETY AND DEPRESSION: ICD-10-CM

## 2025-01-20 DIAGNOSIS — Z12.31 SCREENING MAMMOGRAM, ENCOUNTER FOR: ICD-10-CM

## 2025-01-20 DIAGNOSIS — K21.9 GASTROESOPHAGEAL REFLUX DISEASE, UNSPECIFIED WHETHER ESOPHAGITIS PRESENT: ICD-10-CM

## 2025-01-20 DIAGNOSIS — E66.1 CLASS 2 DRUG-INDUCED OBESITY IN ADULT, UNSPECIFIED BMI, UNSPECIFIED WHETHER SERIOUS COMORBIDITY PRESENT: ICD-10-CM

## 2025-01-20 DIAGNOSIS — Z51.81 THERAPEUTIC DRUG MONITORING: Primary | ICD-10-CM

## 2025-01-20 DIAGNOSIS — E66.812 CLASS 2 DRUG-INDUCED OBESITY IN ADULT, UNSPECIFIED BMI, UNSPECIFIED WHETHER SERIOUS COMORBIDITY PRESENT: ICD-10-CM

## 2025-01-20 DIAGNOSIS — K59.00 CONSTIPATION, UNSPECIFIED CONSTIPATION TYPE: ICD-10-CM

## 2025-01-20 DIAGNOSIS — Z00.00 ANNUAL PHYSICAL EXAM: ICD-10-CM

## 2025-01-20 DIAGNOSIS — L25.9 CONTACT DERMATITIS, UNSPECIFIED CONTACT DERMATITIS TYPE, UNSPECIFIED TRIGGER: ICD-10-CM

## 2025-01-20 DIAGNOSIS — Z51.81 THERAPEUTIC DRUG MONITORING: ICD-10-CM

## 2025-01-20 DIAGNOSIS — F41.9 ANXIETY: ICD-10-CM

## 2025-01-20 PROCEDURE — 3075F SYST BP GE 130 - 139MM HG: CPT | Performed by: INTERNAL MEDICINE

## 2025-01-20 PROCEDURE — 3078F DIAST BP <80 MM HG: CPT | Performed by: INTERNAL MEDICINE

## 2025-01-20 PROCEDURE — 3008F BODY MASS INDEX DOCD: CPT | Performed by: INTERNAL MEDICINE

## 2025-01-20 PROCEDURE — 99214 OFFICE O/P EST MOD 30 MIN: CPT | Performed by: INTERNAL MEDICINE

## 2025-01-20 RX ORDER — TIRZEPATIDE 10 MG/.5ML
10 INJECTION, SOLUTION SUBCUTANEOUS WEEKLY
Qty: 6 ML | Refills: 0 | Status: SHIPPED | OUTPATIENT
Start: 2025-01-20

## 2025-01-20 RX ORDER — PANTOPRAZOLE SODIUM 20 MG/1
20 TABLET, DELAYED RELEASE ORAL
Qty: 90 TABLET | Refills: 0 | Status: SHIPPED | OUTPATIENT
Start: 2025-01-20 | End: 2025-04-20

## 2025-01-20 RX ORDER — PHENTERMINE HYDROCHLORIDE 37.5 MG/1
37.5 TABLET ORAL
Qty: 60 TABLET | Refills: 0 | Status: SHIPPED | OUTPATIENT
Start: 2025-01-20 | End: 2025-03-21

## 2025-01-20 NOTE — PROGRESS NOTES
Enedina Knapp is a 64 year old female.    Chief complaint:weight loss follow up , medication refill, therapeutic drug monitoring    HPI:   ENEDINA here for follow up on weight loss   Wt Readings from Last 12 Encounters:   01/20/25 149 lb 6.4 oz (67.8 kg)   08/19/24 161 lb (73 kg)   04/15/24 162 lb 9.6 oz (73.8 kg)   01/15/24 179 lb (81.2 kg)   10/31/23 189 lb 12.8 oz (86.1 kg)   08/15/23 192 lb 12.8 oz (87.5 kg)   07/01/23 188 lb 6.4 oz (85.5 kg)   04/05/23 187 lb 9.6 oz (85.1 kg)   11/07/22 183 lb 6.4 oz (83.2 kg)   08/08/22 188 lb 9.6 oz (85.5 kg)   05/16/22 195 lb 3.2 oz (88.5 kg)   03/15/22 206 lb 12.8 oz (93.8 kg)     Starting weight: 236 lbs   Total weight loss: 87 lbs   Medication: wellbutrin   Phentermine   Zepbound 12.5 mg once week         Typical diet   Breakfast: Lunch: Dinner: Snacks:   Eggs    Hamburgers tries to get the sliders     She will have a protein shake    Has been having more crackers   Than before    Doesn't count carbs or protein   She is mindful   She is eating 20-30 g per day   Carbs : 60 g of carbs per day     Soda/ juice/ alcohol:alcohol this week because she was in [place]   Water intake: inadequate sometimes doesn't drink anything during the day     Exercise: walking     Challenges: food choices   She is not hungry   Side effect of medication: nausea   Vomiting 3 times per week       Score to self ( how well she is doing ):8/10      She recently stopped using sertraline   She feels a little better   She is taking wellutrin only           GERD on pepcid   Doesn't feel like it helps       Bunions   Would like to see a podiatrist         Current Outpatient Medications   Medication Sig Dispense Refill    Tirzepatide-Weight Management (ZEPBOUND) 12.5 MG/0.5ML Subcutaneous Solution Auto-injector Inject 12.5 mg into the skin once a week. 6 mL 0    sertraline 50 MG Oral Tab Take 1 tablet (50 mg total) by mouth daily. 90 tablet 3    buPROPion  MG Oral Tablet 24 Hr Take 1 tablet  Pt A/Ox4. VSS. Denies pain. Pt unlabored, respirations even & easy. No distress noted. Shift assessment complete. See flowsheet. PM meds given except one-time dose Klor-con d/t patient unable to swallow pill. PRN effer-K given as alternative. See MAR. Assisted pt to bedside commode. Denies further needs at this time. Bed alarm on. Bed in low position. Call light within reach.         06/02/21 2026   Vital Signs   Temp 98.1 °F (36.7 °C)   Temp Source Oral   Pulse 77   Heart Rate Source Monitor   Resp 14   /71   BP Location Left upper arm   Patient Position Supine   Level of Consciousness Alert (0)   MEWS Score 0   Oxygen Therapy   SpO2 95 %   O2 Device None (Room air) (150 mg total) by mouth daily. 90 tablet 0    LORazepam 1 MG Oral Tab Take 1 tablet (1 mg total) by mouth 2 (two) times daily as needed for Anxiety. 60 tablet 0    ondansetron 4 MG Oral Tablet Dispersible Take 1 tablet (4 mg total) by mouth every 8 (eight) hours as needed. 30 tablet 0    ondansetron 4 MG Oral Tablet Dispersible Take 1 tablet (4 mg total) by mouth every 8 (eight) hours as needed. 30 tablet 0    cetirizine 10 MG Oral Tab Take 1 tablet (10 mg total) by mouth every morning. 90 tablet 0    valACYclovir 1 G Oral Tab 2 tabs bid x 1 day 4 tablet 2    Nystatin 419139 UNIT/GM External Powder Apply 1 Application topically 4 (four) times daily. 30 g 0    LORazepam 1 MG Oral Tab Take 1 tablet (1 mg total) by mouth 2 (two) times daily as needed for Anxiety. 60 tablet 0    Tirzepatide-Weight Management (ZEPBOUND) 7.5 MG/0.5ML Subcutaneous Solution Auto-injector Inject 7.5 mg into the skin once a week. (Patient not taking: Reported on 1/20/2025) 6 mL 0    WEGOVY 1.7 MG/0.75ML Subcutaneous Solution Auto-injector Inject 0.75 mL (1.7 mg total) under the skin once a week. (Patient not taking: Reported on 1/20/2025) 6 mL 0    docusate sodium 100 MG Oral Cap Take 1 capsule (100 mg total) by mouth 2 (two) times daily. (Patient not taking: Reported on 1/20/2025) 60 capsule 2    Meloxicam 7.5 MG Oral Tab Take 1 tablet (7.5 mg total) by mouth 2 (two) times daily as needed for Pain. (Patient not taking: Reported on 1/20/2025) 60 tablet 0    COVID-19 mRNA Bival Vac Booster (MODERNA COVID-19 BIVALENT) 50 MCG/0.5ML Intramuscular Suspension Inject 0.5 mL (50 mcg total) into the muscle Prior to discharge. (Patient not taking: Reported on 1/20/2025) 0.5 mL 0    LORazepam 1 MG Oral Tab Take 1 tablet (1 mg total) by mouth 2 (two) times daily as needed for Anxiety. (Patient not taking: Reported on 1/20/2025) 60 tablet 0    WEGOVY 1.7 MG/0.75ML Subcutaneous Solution Auto-injector Inject 0.75 mL (1.7 mg total) into the skin once a week.  (Patient not taking: Reported on 1/20/2025)      naltrexone 50 MG Oral Tab Take 1 tablet (50 mg total) by mouth daily. (Patient not taking: Reported on 1/20/2025) 30 tablet 2    famotidine 20 MG Oral Tab Take 1 tablet (20 mg total) by mouth daily as needed. (Patient not taking: Reported on 1/20/2025) 90 tablet 3    ondansetron 4 MG Oral Tablet Dispersible Take 1 tablet (4 mg total) by mouth every 8 (eight) hours as needed for Nausea. (Patient not taking: Reported on 1/20/2025) 30 tablet 1    ERGOCALCIFEROL 1.25 MG (79710 UT) Oral Cap TAKE 1 CAPSULE (50,000 UNITS TOTAL) BY MOUTH ONCE A WEEK FOR 12 DOSES (Patient not taking: Reported on 1/20/2025) 12 capsule 1    FLOWFLEX COVID-19 AG HOME TEST In Vitro Kit FOR PERSONAL USE ONLY. NOT FOR EMPLOYMENT PURPOSES OR FOR RESALE. (Patient not taking: Reported on 1/20/2025)      clobetasol 0.05 % External Solution Apply 1 mL topically 2 (two) times daily. (Patient not taking: Reported on 1/20/2025) 25 mL 1    CLOTRIMAZOLE-BETAMETHASONE 1-0.05 % External Cream APPLY 1 APPLICATION TOPICALLY 2 (TWO) TIMES DAILY AS NEEDED. (Patient not taking: Reported on 1/20/2025) 15 g 1    LORAZEPAM 1 MG Oral Tab TAKE 1 TABLET BY MOUTH TWICE A DAY (Patient not taking: Reported on 1/20/2025) 60 tablet 0    Pantoprazole Sodium 40 MG Oral Tab EC Take 1 tablet (40 mg total) by mouth every morning before breakfast. (Patient not taking: Reported on 1/20/2025) 90 tablet 1    Fluticasone Furoate (FLONASE SENSIMIST) 27.5 MCG/SPRAY Nasal Suspension 2 sprays, one at a time to each nostril x 15 days. (Patient not taking: Reported on 1/20/2025) 1 each 0    clotrimazole 1 % External Cream APPLY 1 APPLICATION TOPICALLY 2 (TWO) TIMES DAILY FOR 14 DAYS. (Patient not taking: Reported on 1/20/2025)  1      Past Medical History:    Anxiety    Anxiety and depression    Arthritis    Class 2 obesity with serious comorbidity and body mass index (BMI) of 38.0 to 38.9 in adult    Fibromyalgia    Gastroesophageal reflux  disease    Skin cancer     Past Surgical History:   Procedure Laterality Date          x2    Cholecystectomy  2005    Open elidia.     Electrocardiogram, complete  2013    SCANNED TO MEDIA TAB - 2013    Hysterectomy  2008     partial, No BSO, no abNL paps.     Repair rotator cuff,acute          Social History:  Social History     Socioeconomic History    Marital status:    Tobacco Use    Smoking status: Former     Current packs/day: 0.00     Types: Cigarettes     Start date: 1970     Quit date: 1992     Years since quittin.8    Smokeless tobacco: Never   Substance and Sexual Activity    Alcohol use: No     Alcohol/week: 0.0 standard drinks of alcohol     Comment: Rarely    Drug use: No   Other Topics Concern    Caffeine Concern No        Family History   Problem Relation Age of Onset    Hypertension Mother     Other (Other) Mother         thyroid    Other (Other) Other         hashimoto    Cancer Father     Heart Disorder Maternal Grandfather 52        mi    Heart Disease Maternal Grandfather 52        CAD    Diabetes Maternal Grandfather     Diabetes Maternal Grandmother     Heart Disease Maternal Grandmother         CAD     Patient Active Problem List   Diagnosis    Abnormal results of thyroid function studies    Routine general medical examination at a health care facility    Elevated blood pressure reading    Class 2 obesity with serious comorbidity and body mass index (BMI) of 38.0 to 38.9 in adult    Anxiety and depression    S/P partial hysterectomy    Morbid obesity (HCC)    Strain of lumbar region    Therapeutic drug monitoring    S/P hysterectomy    Skin cancer    History of skin cancer    Gastroesophageal reflux disease    Nausea and vomiting               REVIEW OF SYSTEMS:   A comprehensive 10 point review of systems was completed.  Pertinent positives and negatives noted in the the HPI          PHYSICAL EXAM:   /78   Pulse 74   Ht 5' 4\" (1.626 m)    Wt 149 lb 6.4 oz (67.8 kg)   SpO2 95%   BMI 25.64 kg/m²   GENERAL: well developed, well nourished,in no apparent distress  LUNGS: clear to auscultation  CARDIO: RRR without murmur  NEURO: no gross deficits              No orders of the defined types were placed in this encounter.        ASSESSMENT/PLAN:       ICD-10-CM    1. Therapeutic drug monitoring  Z51.81 Tirzepatide-Weight Management (ZEPBOUND) 10 MG/0.5ML Subcutaneous Solution Auto-injector     pantoprazole 20 MG Oral Tab EC     Phentermine HCl 37.5 MG Oral Tab     Podiatry Referral - In Network      2. Obesity (BMI 30-39.9)  E66.9 Tirzepatide-Weight Management (ZEPBOUND) 10 MG/0.5ML Subcutaneous Solution Auto-injector     pantoprazole 20 MG Oral Tab EC     Phentermine HCl 37.5 MG Oral Tab     Podiatry Referral - In Network      3. Bunion  M21.619 Tirzepatide-Weight Management (ZEPBOUND) 10 MG/0.5ML Subcutaneous Solution Auto-injector     pantoprazole 20 MG Oral Tab EC     Phentermine HCl 37.5 MG Oral Tab     Podiatry Referral - In Network      4. Morbid obesity (HCC)  E66.01 Tirzepatide-Weight Management (ZEPBOUND) 10 MG/0.5ML Subcutaneous Solution Auto-injector     pantoprazole 20 MG Oral Tab EC     Phentermine HCl 37.5 MG Oral Tab     Podiatry Referral - In Network      5. Gastroesophageal reflux disease, unspecified whether esophagitis present  K21.9 Tirzepatide-Weight Management (ZEPBOUND) 10 MG/0.5ML Subcutaneous Solution Auto-injector     pantoprazole 20 MG Oral Tab EC     Phentermine HCl 37.5 MG Oral Tab     Podiatry Referral - In Network         Plan:  Patient has lost weight # since LOV. Patient has lost a total weight loss of 87 lbs # since first weight loss consult.  Labs reviewed  Advised to continue with low carb diet   Goal is less than 50 g per day   Advised to read nutrition labels  Log in carbs if possible   Increase protein intake   exercise goal is 1 hour 3 times per week cardio and strength training   discussed challenges with weight  loss   Weigh once a week at least   Avoid sugary drinks or artificially sweetened drinks  Water intake goal is 64 oz per day   Goal is to maintain her weight   Cut down zepbound to 10 mg weekly given the vomiting   Advised to start pantoprazole   If she continues to have GI side effects to stop zepbound and see GI   Continue phentermine and wellbutrin   Advised to see plastic surgeon so she can discuss excess skin removal options   Referral to podiatry since with bunion         Plan:  Nutrition: low carb diet   FITTE:  ACSM recommendations (150 -200 minutes/week in active weight loss)  Behavior:  Motivational interviewing performed      Discussed strategies to overcome habits/challenges       Reviewed:  Nutrition and the importance of regular protein intake  Labs ordered:    no   Treatment plan     I spent 30 minutes at the day of the service seeing the patient, examination, reviewing labs, independently interpreting results, completing charting and counseling the patient and/or on coordination of care.  The diagnosis, prognosis, and general treatment was explained to the patient.     Please return to the clinic if you are having persistent or worsening symptoms   Vimal Quintanilla MD,   Diplomate of the American Board of Internal Medicine  Diplomate of the American Board of Obesity Medicine

## 2025-01-21 RX ORDER — ONDANSETRON 4 MG/1
4 TABLET, ORALLY DISINTEGRATING ORAL EVERY 8 HOURS PRN
Qty: 30 TABLET | Refills: 0 | Status: SHIPPED | OUTPATIENT
Start: 2025-01-21

## 2025-01-21 RX ORDER — CETIRIZINE HYDROCHLORIDE 10 MG/1
10 TABLET ORAL EVERY MORNING
Qty: 90 TABLET | Refills: 0 | Status: SHIPPED | OUTPATIENT
Start: 2025-01-21

## 2025-01-21 RX ORDER — LORAZEPAM 1 MG/1
1 TABLET ORAL 2 TIMES DAILY PRN
Qty: 60 TABLET | Refills: 0 | Status: SHIPPED | OUTPATIENT
Start: 2025-01-21

## 2025-01-21 RX ORDER — BUPROPION HYDROCHLORIDE 150 MG/1
150 TABLET ORAL DAILY
Qty: 90 TABLET | Refills: 0 | Status: SHIPPED | OUTPATIENT
Start: 2025-01-21

## 2025-02-28 ENCOUNTER — HOSPITAL ENCOUNTER (OUTPATIENT)
Dept: MAMMOGRAPHY | Age: 65
Discharge: HOME | End: 2025-02-28
Attending: INTERNAL MEDICINE
Payer: COMMERCIAL

## 2025-02-28 ENCOUNTER — HOSPITAL ENCOUNTER (OUTPATIENT)
Dept: GENERAL RADIOLOGY | Age: 65
Discharge: HOME | End: 2025-02-28
Attending: STUDENT IN AN ORGANIZED HEALTH CARE EDUCATION/TRAINING PROGRAM
Payer: COMMERCIAL

## 2025-02-28 ENCOUNTER — OFFICE VISIT (OUTPATIENT)
Dept: PODIATRY CLINIC | Facility: CLINIC | Age: 65
End: 2025-02-28

## 2025-02-28 DIAGNOSIS — Z13.6 SCREENING FOR HEART DISEASE: ICD-10-CM

## 2025-02-28 DIAGNOSIS — Z78.0 ASYMPTOMATIC MENOPAUSE: ICD-10-CM

## 2025-02-28 DIAGNOSIS — E66.01 MORBID OBESITY (HCC): ICD-10-CM

## 2025-02-28 DIAGNOSIS — T84.84XA PAINFUL ORTHOPAEDIC HARDWARE: ICD-10-CM

## 2025-02-28 DIAGNOSIS — R25.2 FOOT CRAMPS: ICD-10-CM

## 2025-02-28 DIAGNOSIS — M20.12 VALGUS DEFORMITY OF BOTH GREAT TOES: ICD-10-CM

## 2025-02-28 DIAGNOSIS — Z12.31 ENCOUNTER FOR SCREENING MAMMOGRAM FOR MALIGNANT NEOPLASM OF BREAST: ICD-10-CM

## 2025-02-28 DIAGNOSIS — Z00.00 ANNUAL PHYSICAL EXAM: ICD-10-CM

## 2025-02-28 DIAGNOSIS — M20.11 VALGUS DEFORMITY OF BOTH GREAT TOES: ICD-10-CM

## 2025-02-28 DIAGNOSIS — F41.9 ANXIETY AND DEPRESSION: ICD-10-CM

## 2025-02-28 DIAGNOSIS — L25.9 CONTACT DERMATITIS, UNSPECIFIED CONTACT DERMATITIS TYPE, UNSPECIFIED TRIGGER: ICD-10-CM

## 2025-02-28 DIAGNOSIS — L98.7 EXCESS SKIN: ICD-10-CM

## 2025-02-28 DIAGNOSIS — Z12.31 SCREENING MAMMOGRAM, ENCOUNTER FOR: ICD-10-CM

## 2025-02-28 DIAGNOSIS — M20.11 VALGUS DEFORMITY OF BOTH GREAT TOES: Primary | ICD-10-CM

## 2025-02-28 DIAGNOSIS — Z51.81 THERAPEUTIC DRUG MONITORING: ICD-10-CM

## 2025-02-28 DIAGNOSIS — M20.12 VALGUS DEFORMITY OF BOTH GREAT TOES: Primary | ICD-10-CM

## 2025-02-28 DIAGNOSIS — F32.A ANXIETY AND DEPRESSION: ICD-10-CM

## 2025-02-28 PROCEDURE — 73630 X-RAY EXAM OF FOOT: CPT | Performed by: STUDENT IN AN ORGANIZED HEALTH CARE EDUCATION/TRAINING PROGRAM

## 2025-02-28 PROCEDURE — 99203 OFFICE O/P NEW LOW 30 MIN: CPT | Performed by: STUDENT IN AN ORGANIZED HEALTH CARE EDUCATION/TRAINING PROGRAM

## 2025-02-28 PROCEDURE — 77067 SCR MAMMO BI INCL CAD: CPT | Performed by: INTERNAL MEDICINE

## 2025-02-28 PROCEDURE — 77063 BREAST TOMOSYNTHESIS BI: CPT | Performed by: INTERNAL MEDICINE

## 2025-02-28 RX ORDER — MAGNESIUM OXIDE 400 MG/1
400 TABLET ORAL DAILY
Qty: 90 TABLET | Refills: 0 | Status: SHIPPED | OUTPATIENT
Start: 2025-02-28 | End: 2025-05-29

## 2025-02-28 NOTE — PROGRESS NOTES
[facility] Podiatry  Progress Note      Candice Knapp is a 64 year old female.   Chief Complaint   Patient presents with    Foot Pain     New pt- bilateral foot- toes deformity- had bilateral foot bunion sx w/ diff Podiatrist- also here for bump in the L hallux check       HPI:     Patient is a pleasant 64-year-old female presents to clinic for evaluation of bilateral bunions.  She admits to having bilateral bunions corrected years ago.  Relates that she had the right bunion corrected twice.  She admits to a small bump on the medial aspect of the left bunion site.  Admits to pain with walking on and off.    Allergies: Patient has no known allergies.    Current Outpatient Medications   Medication Sig Dispense Refill    magnesium oxide 400 MG Oral Tab Take 1 tablet (400 mg total) by mouth daily. 90 tablet 0    cetirizine 10 MG Oral Tab Take 1 tablet (10 mg total) by mouth every morning. 90 tablet 0    buPROPion  MG Oral Tablet 24 Hr Take 1 tablet (150 mg total) by mouth daily. 90 tablet 0    LORazepam 1 MG Oral Tab Take 1 tablet (1 mg total) by mouth 2 (two) times daily as needed for Anxiety. 60 tablet 0    ondansetron 4 MG Oral Tablet Dispersible Take 1 tablet (4 mg total) by mouth every 8 (eight) hours as needed. 30 tablet 0    Tirzepatide-Weight Management (ZEPBOUND) 10 MG/0.5ML Subcutaneous Solution Auto-injector Inject 10 mg into the skin once a week. 6 mL 0    pantoprazole 20 MG Oral Tab EC Take 1 tablet (20 mg total) by mouth every morning before breakfast. 90 tablet 0    Phentermine HCl 37.5 MG Oral Tab Take 1 tablet (37.5 mg total) by mouth every morning before breakfast. 60 tablet 0    Tirzepatide-Weight Management (ZEPBOUND) 12.5 MG/0.5ML Subcutaneous Solution Auto-injector Inject 12.5 mg into the skin once a week. 6 mL 0    sertraline 50 MG Oral Tab Take 1 tablet (50 mg total) by mouth daily. 90 tablet 3    ondansetron 4 MG Oral Tablet Dispersible Take 1 tablet (4 mg total) by mouth every  8 (eight) hours as needed. 30 tablet 0    valACYclovir 1 G Oral Tab 2 tabs bid x 1 day 4 tablet 2    Tirzepatide-Weight Management (ZEPBOUND) 7.5 MG/0.5ML Subcutaneous Solution Auto-injector Inject 7.5 mg into the skin once a week. (Patient not taking: Reported on 1/20/2025) 6 mL 0    Nystatin [id number] UNIT/GM External Powder Apply 1 Application topically 4 (four) times daily. 30 g 0    WEGOVY 1.7 MG/0.75ML Subcutaneous Solution Auto-injector Inject 0.75 mL (1.7 mg total) under the skin once a week. (Patient not taking: Reported on 1/20/2025) 6 mL 0    docusate sodium 100 MG Oral Cap Take 1 capsule (100 mg total) by mouth 2 (two) times daily. (Patient not taking: Reported on 1/20/2025) 60 capsule 2    Meloxicam 7.5 MG Oral Tab Take 1 tablet (7.5 mg total) by mouth 2 (two) times daily as needed for Pain. (Patient not taking: Reported on 1/20/2025) 60 tablet 0    COVID-19 mRNA Bival Vac Booster (MODERNA COVID-19 BIVALENT) 50 MCG/0.5ML Intramuscular Suspension Inject 0.5 mL (50 mcg total) into the muscle Prior to discharge. (Patient not taking: Reported on 1/20/2025) 0.5 mL 0    LORazepam 1 MG Oral Tab Take 1 tablet (1 mg total) by mouth 2 (two) times daily as needed for Anxiety. 60 tablet 0    LORazepam 1 MG Oral Tab Take 1 tablet (1 mg total) by mouth 2 (two) times daily as needed for Anxiety. (Patient not taking: Reported on 1/20/2025) 60 tablet 0    WEGOVY 1.7 MG/0.75ML Subcutaneous Solution Auto-injector Inject 0.75 mL (1.7 mg total) into the skin once a week. (Patient not taking: Reported on 1/20/2025)      naltrexone 50 MG Oral Tab Take 1 tablet (50 mg total) by mouth daily. (Patient not taking: Reported on 1/20/2025) 30 tablet 2    famotidine 20 MG Oral Tab Take 1 tablet (20 mg total) by mouth daily as needed. (Patient not taking: Reported on 1/20/2025) 90 tablet 3    ondansetron 4 MG Oral Tablet Dispersible Take 1 tablet (4 mg total) by mouth every 8 (eight) hours as needed for Nausea. (Patient not taking:  Reported on 2025) 30 tablet 1    ERGOCALCIFEROL 1.25 MG (80883 UT) Oral Cap TAKE 1 CAPSULE (50,000 UNITS TOTAL) BY MOUTH ONCE A WEEK FOR 12 DOSES (Patient not taking: Reported on 2025) 12 capsule 1    FLOWFLEX COVID-19 AG HOME TEST In Vitro Kit FOR PERSONAL USE ONLY. NOT FOR EMPLOYMENT PURPOSES OR FOR RESALE. (Patient not taking: Reported on 2025)      clobetasol 0.05 % External Solution Apply 1 mL topically 2 (two) times daily. (Patient not taking: Reported on 2025) 25 mL 1    CLOTRIMAZOLE-BETAMETHASONE 1-0.05 % External Cream APPLY 1 APPLICATION TOPICALLY 2 (TWO) TIMES DAILY AS NEEDED. (Patient not taking: Reported on 2025) 15 g 1    LORAZEPAM 1 MG Oral Tab TAKE 1 TABLET BY MOUTH TWICE A DAY (Patient not taking: Reported on 2025) 60 tablet 0    Pantoprazole Sodium 40 MG Oral Tab EC Take 1 tablet (40 mg total) by mouth every morning before breakfast. (Patient not taking: Reported on 2025) 90 tablet 1    Fluticasone Furoate (FLONASE SENSIMIST) 27.5 MCG/SPRAY Nasal Suspension 2 sprays, one at a time to each nostril x 15 days. (Patient not taking: Reported on 2025) 1 each 0    clotrimazole 1 % External Cream APPLY 1 APPLICATION TOPICALLY 2 (TWO) TIMES DAILY FOR 14 DAYS. (Patient not taking: Reported on 2025)  1      Past Medical History:    Anxiety    Anxiety and depression    Arthritis    Class 2 obesity with serious comorbidity and body mass index (BMI) of 38.0 to 38.9 in adult    Fibromyalgia    Gastroesophageal reflux disease    Skin cancer      Past Surgical History:   Procedure Laterality Date          x2    Cholecystectomy  2005    Open elidia.     Electrocardiogram, complete  2013    SCANNED TO MEDIA TAB - 2013    Hysterectomy  2008     partial, No BSO, no abNL paps.     Repair rotator cuff,acute        Family History   Problem Relation Age of Onset    Hypertension Mother     Other (Other) Mother         thyroid    Cancer Father     Diabetes  Maternal Grandmother     Heart Disease Maternal Grandmother         CAD    Heart Disorder Maternal Grandfather 52        mi    Heart Disease Maternal Grandfather 52        CAD    Diabetes Maternal Grandfather     Other (Other) Other         hashimoto    Breast Cancer Neg     Ovarian Cancer Neg     Pancreatic Cancer Neg       Social History     Socioeconomic History    Marital status:    Tobacco Use    Smoking status: Former     Current packs/day: 0.00     Types: Cigarettes     Start date: 1970     Quit date: 1992     Years since quittin.9    Smokeless tobacco: Never   Substance and Sexual Activity    Alcohol use: No     Alcohol/week: 0.0 standard drinks of alcohol     Comment: Rarely    Drug use: No   Other Topics Concern    Caffeine Concern No           REVIEW OF SYSTEMS:     Denies nause, fever, chills  No calf pain  Denies chest pain or SOB      EXAM:   There were no vitals taken for this visit.  GENERAL: well developed, well nourished, in no apparent distress  EXTREMITIES:   1. Integument: Normal skin temperature and turgor  2. Vascular: Dorsalis pedis two out of four bilateral and posterior tibial pulses two out of   four bilateral, capillary refill normal.   3. Musculoskeletal: All muscle groups are graded 5 out of 5 in the foot and ankle.  Lateral deviation of bilateral hallux with prominent first metatarsal medial eminence with left worse than right.  Prominent hardware noted on the medial aspect of the left first metatarsal head.   4. Neurological: Normal sharp dull sensation; reflexes normal.             ASSESSMENT AND PLAN:   Diagnoses and all orders for this visit:    Valgus deformity of both great toes  -     XR FOOT, COMPLETE (MIN 3 VIEWS), RIGHT (CPT=73630); Future  -     XR FOOT, COMPLETE (MIN 3 VIEWS), LEFT (CPT=73630); Future    Painful orthopaedic hardware  -     XR FOOT, COMPLETE (MIN 3 VIEWS), LEFT (CPT=73630); Future    Foot cramps    Other orders  -     magnesium oxide 400  MG Oral Tab; Take 1 tablet (400 mg total) by mouth daily.        Plan:     Patient seen and examined and findings discussed with patient in detail.  Discussed etiology of condition along treatment options.  Discussed with patient that I would like to have bilateral foot x-rays for further evaluation.  We discussed possibility of hardware removal on the left first metatarsal.  We also discussed possibility of bunion correction of left foot.  Patient to follow-up with me in clinic in 1 week for x-ray reviews.    The patient indicates understanding of these issues and agrees to the plan.        Ashlie Bustos DPM

## 2025-03-03 RX ORDER — CETIRIZINE HYDROCHLORIDE 10 MG/1
10 TABLET ORAL EVERY MORNING
Qty: 90 TABLET | Refills: 0 | Status: SHIPPED | OUTPATIENT
Start: 2025-03-03

## 2025-03-03 RX ORDER — LORAZEPAM 1 MG/1
1 TABLET ORAL 2 TIMES DAILY PRN
Qty: 60 TABLET | Refills: 0 | Status: SHIPPED | OUTPATIENT
Start: 2025-03-03

## 2025-03-04 DIAGNOSIS — M21.619 BUNION: ICD-10-CM

## 2025-03-04 DIAGNOSIS — Z51.81 THERAPEUTIC DRUG MONITORING: ICD-10-CM

## 2025-03-04 DIAGNOSIS — K21.9 GASTROESOPHAGEAL REFLUX DISEASE, UNSPECIFIED WHETHER ESOPHAGITIS PRESENT: ICD-10-CM

## 2025-03-04 DIAGNOSIS — E66.9 OBESITY (BMI 30-39.9): ICD-10-CM

## 2025-03-04 DIAGNOSIS — E66.01 MORBID OBESITY (HCC): ICD-10-CM

## 2025-03-04 RX ORDER — PANTOPRAZOLE SODIUM 20 MG/1
20 TABLET, DELAYED RELEASE ORAL
Qty: 90 TABLET | Refills: 0 | Status: SHIPPED | OUTPATIENT
Start: 2025-03-04 | End: 2025-06-02

## 2025-03-05 RX ORDER — TIRZEPATIDE 10 MG/.5ML
10 INJECTION, SOLUTION SUBCUTANEOUS WEEKLY
Qty: 6 ML | Refills: 0 | Status: SHIPPED | OUTPATIENT
Start: 2025-03-05

## 2025-03-07 ENCOUNTER — TELEPHONE (OUTPATIENT)
Dept: PODIATRY CLINIC | Facility: CLINIC | Age: 65
End: 2025-03-07

## 2025-03-07 ENCOUNTER — OFFICE VISIT (OUTPATIENT)
Dept: PODIATRY CLINIC | Facility: CLINIC | Age: 65
End: 2025-03-07
Payer: COMMERCIAL

## 2025-03-07 ENCOUNTER — TELEPHONE (OUTPATIENT)
Dept: INTERNAL MEDICINE CLINIC | Facility: CLINIC | Age: 65
End: 2025-03-07

## 2025-03-07 ENCOUNTER — PATIENT MESSAGE (OUTPATIENT)
Dept: INTERNAL MEDICINE CLINIC | Facility: CLINIC | Age: 65
End: 2025-03-07

## 2025-03-07 DIAGNOSIS — M20.12 VALGUS DEFORMITY OF BOTH GREAT TOES: Primary | ICD-10-CM

## 2025-03-07 DIAGNOSIS — M19.072 ARTHRITIS OF FIRST METATARSOPHALANGEAL (MTP) JOINT OF LEFT FOOT: ICD-10-CM

## 2025-03-07 DIAGNOSIS — M20.11 VALGUS DEFORMITY OF BOTH GREAT TOES: Primary | ICD-10-CM

## 2025-03-07 PROCEDURE — 99214 OFFICE O/P EST MOD 30 MIN: CPT | Performed by: STUDENT IN AN ORGANIZED HEALTH CARE EDUCATION/TRAINING PROGRAM

## 2025-03-07 NOTE — PROGRESS NOTES
[facility] Podiatry  Progress Note      Candice Knapp is a 64 year old female.   Chief Complaint   Patient presents with    Bunions     F/u bilateral bunion, has intermittent pain, here for Xr results.             HPI:     Patient is a pleasant 64-year-old female who presents to clinic for follow-up of bilateral bunions.  Patient had a osteotomy to the right first metatarsal bunion with 2 screw fixation and had a bumpectomy of the left bunion.  Patient admits to pain mostly on the left foot.  Her bunion has reoccurred and has difficulty wearing shoes and intermittent pain.  Patient obtain bilateral foot x-rays and is here to discuss the results.    Allergies: Patient has no known allergies.    Current Outpatient Medications   Medication Sig Dispense Refill    Tirzepatide-Weight Management (ZEPBOUND) 10 MG/0.5ML Subcutaneous Solution Auto-injector Inject 10 mg into the skin once a week. 6 mL 0    pantoprazole 20 MG Oral Tab EC Take 1 tablet (20 mg total) by mouth every morning before breakfast. 90 tablet 0    cetirizine 10 MG Oral Tab Take 1 tablet (10 mg total) by mouth every morning. 90 tablet 0    LORazepam 1 MG Oral Tab Take 1 tablet (1 mg total) by mouth 2 (two) times daily as needed for Anxiety. 60 tablet 0    magnesium oxide 400 MG Oral Tab Take 1 tablet (400 mg total) by mouth daily. 90 tablet 0    buPROPion  MG Oral Tablet 24 Hr Take 1 tablet (150 mg total) by mouth daily. 90 tablet 0    ondansetron 4 MG Oral Tablet Dispersible Take 1 tablet (4 mg total) by mouth every 8 (eight) hours as needed. 30 tablet 0    Phentermine HCl 37.5 MG Oral Tab Take 1 tablet (37.5 mg total) by mouth every morning before breakfast. 60 tablet 0    Tirzepatide-Weight Management (ZEPBOUND) 12.5 MG/0.5ML Subcutaneous Solution Auto-injector Inject 12.5 mg into the skin once a week. 6 mL 0    sertraline 50 MG Oral Tab Take 1 tablet (50 mg total) by mouth daily. 90 tablet 3    ondansetron 4 MG Oral Tablet Dispersible  Take 1 tablet (4 mg total) by mouth every 8 (eight) hours as needed. 30 tablet 0    valACYclovir 1 G Oral Tab 2 tabs bid x 1 day 4 tablet 2    Tirzepatide-Weight Management (ZEPBOUND) 7.5 MG/0.5ML Subcutaneous Solution Auto-injector Inject 7.5 mg into the skin once a week. 6 mL 0    Nystatin 308477 UNIT/GM External Powder Apply 1 Application topically 4 (four) times daily. 30 g 0    WEGOVY 1.7 MG/0.75ML Subcutaneous Solution Auto-injector Inject 0.75 mL (1.7 mg total) under the skin once a week. 6 mL 0    docusate sodium 100 MG Oral Cap Take 1 capsule (100 mg total) by mouth 2 (two) times daily. 60 capsule 2    Meloxicam 7.5 MG Oral Tab Take 1 tablet (7.5 mg total) by mouth 2 (two) times daily as needed for Pain. 60 tablet 0    COVID-19 mRNA Bival Vac Booster (MODERNA COVID-19 BIVALENT) 50 MCG/0.5ML Intramuscular Suspension Inject 0.5 mL (50 mcg total) into the muscle Prior to discharge. 0.5 mL 0    LORazepam 1 MG Oral Tab Take 1 tablet (1 mg total) by mouth 2 (two) times daily as needed for Anxiety. 60 tablet 0    LORazepam 1 MG Oral Tab Take 1 tablet (1 mg total) by mouth 2 (two) times daily as needed for Anxiety. 60 tablet 0    WEGOVY 1.7 MG/0.75ML Subcutaneous Solution Auto-injector Inject 0.75 mL (1.7 mg total) into the skin once a week.      naltrexone 50 MG Oral Tab Take 1 tablet (50 mg total) by mouth daily. 30 tablet 2    famotidine 20 MG Oral Tab Take 1 tablet (20 mg total) by mouth daily as needed. 90 tablet 3    ondansetron 4 MG Oral Tablet Dispersible Take 1 tablet (4 mg total) by mouth every 8 (eight) hours as needed for Nausea. 30 tablet 1    ERGOCALCIFEROL 1.25 MG (76274 UT) Oral Cap TAKE 1 CAPSULE (50,000 UNITS TOTAL) BY MOUTH ONCE A WEEK FOR 12 DOSES 12 capsule 1    FLOWFLEX COVID-19 AG HOME TEST In Vitro Kit       clobetasol 0.05 % External Solution Apply 1 mL topically 2 (two) times daily. 25 mL 1    CLOTRIMAZOLE-BETAMETHASONE 1-0.05 % External Cream APPLY 1 APPLICATION TOPICALLY 2 (TWO) TIMES  DAILY AS NEEDED. 15 g 1    LORAZEPAM 1 MG Oral Tab TAKE 1 TABLET BY MOUTH TWICE A DAY 60 tablet 0    Pantoprazole Sodium 40 MG Oral Tab EC Take 1 tablet (40 mg total) by mouth every morning before breakfast. 90 tablet 1    Fluticasone Furoate (FLONASE SENSIMIST) 27.5 MCG/SPRAY Nasal Suspension 2 sprays, one at a time to each nostril x 15 days. 1 each 0    clotrimazole 1 % External Cream   1      Past Medical History:    Anxiety    Anxiety and depression    Arthritis    Class 2 obesity with serious comorbidity and body mass index (BMI) of 38.0 to 38.9 in adult    Fibromyalgia    Gastroesophageal reflux disease    Skin cancer      Past Surgical History:   Procedure Laterality Date          x2    Cholecystectomy  2005    Open elidia.     Electrocardiogram, complete  2013    SCANNED TO MEDIA TAB - 2013    Hysterectomy       partial, No BSO, no abNL paps.     Repair rotator cuff,acute        Family History   Problem Relation Age of Onset    Hypertension Mother     Other (Other) Mother         thyroid    Cancer Father     Diabetes Maternal Grandmother     Heart Disease Maternal Grandmother         CAD    Heart Disorder Maternal Grandfather 52        mi    Heart Disease Maternal Grandfather 52        CAD    Diabetes Maternal Grandfather     Other (Other) Other         hashimoto    Breast Cancer Neg     Ovarian Cancer Neg     Pancreatic Cancer Neg       Social History     Socioeconomic History    Marital status:    Tobacco Use    Smoking status: Former     Current packs/day: 0.00     Types: Cigarettes     Start date: 1970     Quit date: 1992     Years since quittin.9    Smokeless tobacco: Never   Substance and Sexual Activity    Alcohol use: No     Alcohol/week: 0.0 standard drinks of alcohol     Comment: Rarely    Drug use: No   Other Topics Concern    Caffeine Concern No           REVIEW OF SYSTEMS:     Denies nause, fever, chills  No calf pain  Denies chest pain or  SOB      EXAM:   There were no vitals taken for this visit.  GENERAL: well developed, well nourished, in no apparent distress  EXTREMITIES:   1. Integument: Normal skin temperature and turgor  2. Vascular: Dorsalis pedis two out of four bilateral and posterior tibial pulses two out of   four bilateral, capillary refill normal.   3. Musculoskeletal: All muscle groups are graded 5 out of 5 in the foot and ankle.  Lateral deviation of bilateral hallux with prominent first metatarsal medial eminence with left worse than right.  Prominent bone spur noted on the medial aspect of the left first metatarsal head. Limited left 1st MPJ ROM   4. Neurological: Normal sharp dull sensation; reflexes normal.      XR FOOT, COMPLETE (MIN 3 VIEWS), RIGHT (CPT=73630)    Result Date: 3/5/2025  CONCLUSION:  1. No acute fracture or dislocation. 2. Mild hallux valgus.  Old healed 1st metatarsal osteotomy and bunionectomy. 3. Prominent plantar calcaneal enthesophyte and Achilles insertion enthesophyte.    Dictated by (CST): Parrish Jasso MD on 3/05/2025 at 1:59 PM     Finalized by (CST): Parrish Jasso MD on 3/05/2025 at 2:02 PM          XR FOOT, COMPLETE (MIN 3 VIEWS), LEFT (CPT=73630)    Result Date: 3/5/2025  CONCLUSION:  1. No acute fracture or dislocation. 2. Moderate hallux valgus measures 36ø.  Hypertrophic degeneration 1st MTP joint.  Old healed 1st metatarsal osteotomy.  Residual bony proliferation medial margin 1st metatarsal head. 3. Small bunionette 5th metatarsal head.    Dictated by (CST): Parrish Jasso MD on 3/05/2025 at 1:57 PM     Finalized by (CST): Parrish Jasso MD on 3/05/2025 at 1:59 PM          [facility] FELECIA 2D+3D SCREENING BILAT (CPT=77067/84288)    Result Date: 3/3/2025  CONCLUSION:   No mammographic evidence for malignancy.  As long as the patient's clinical breast exam remains unchanged, annual screening mammogram is recommended.   BI-RADS CATEGORY:   DIAGNOSTIC CATEGORY 2 - BENIGN FINDING:    RECOMMENDATIONS:  ROUTINE MAMMOGRAM AND CLINICAL EVALUATION IN 12 MONTHS.       PLEASE NOTE: NORMAL MAMMOGRAM DOES NOT EXCLUDE THE POSSIBILITY OF BREAST CANCER.  A CLINICALLY SUSPICIOUS PALPABLE LUMP SHOULD BE BIOPSIED.   For patients over the age of 40, the target due date for the patient's next mammogram has been entered into a reminder system.   Patient received a discharge summary from the technologist after completion of exam.  Breast marker legend used on images  Triangle = Palpable lump Arboles = Skin tag or mole BB = Nipple Linear hannah = Scar Square = Pain    Dictated by (CST): Trent Phan MD on 3/03/2025 at 9:26 AM     Finalized by (CST): Trent Phan MD on 3/03/2025 at 9:28 AM            ASSESSMENT AND PLAN:   Diagnoses and all orders for this visit:    Valgus deformity of both great toes    Arthritis of first metatarsophalangeal (MTP) joint of left foot        Plan:     Patient examined, chart history reviewed.  -Obtained and reviewed x-ray findings with patient--end-stage arthritic changes noted to first MPJ at region of patient's pain.  -Discussed etiology of patient's condition various treatment options.  -Conservatively, recommend supportive shoe gear, orthotics, activity modification, and anti-inflammatories.  Patient has exhausted conservative treatment options and continues to have pain to site.  She is interested in surgical treatment options at this time.  -Surgically, discussed  first MPJ fusion including benefits and risks of each procedure.  Discussed with patient that I would favor first MPJ fusion as it is more definitive procedure and will last long-term given her age.  -Surgery was discussed in detail including benefits, risks, and recovery period.  Patient understands that she would require period of nonweightbearing for 4 to 6 weeks as well as walking in cam boot for 4 to 6 weeks.  -Patient is interested in surgery at this time.  Our  will reach out to schedule  surgery.  -All questions were answered to satisfaction.       The patient indicates understanding of these issues and agrees to the plan.        Ashlie Bustos DPM

## 2025-03-07 NOTE — TELEPHONE ENCOUNTER
Patient is calling in to get more info about PA for zepbound. Patient states [facility] does not have zepbound in stock. Please call to advise.

## 2025-03-10 NOTE — TELEPHONE ENCOUNTER
PA approved for Zepbound 10mg.     Notify patient.   RAYMUNDO OSORIO     Approved    Prior authorization approved  Payer: [facility] Case ID: [id number]    [phone]  Note from payer: The case has been Approved from  [id number] to [id number]  Approval Details    Authorized from February 5, 2025 to March 7, 2026

## 2025-03-31 DIAGNOSIS — M19.072 ARTHRITIS OF LEFT ANKLE: Primary | ICD-10-CM

## 2025-03-31 DIAGNOSIS — M20.12 ACQUIRED HALLUX VALGUS OF LEFT FOOT: ICD-10-CM

## 2025-04-02 ENCOUNTER — TELEPHONE (OUTPATIENT)
Dept: PODIATRY CLINIC | Facility: CLINIC | Age: 65
End: 2025-04-02

## 2025-04-02 NOTE — TELEPHONE ENCOUNTER
Dr. Bustos,                                            2 FORMS      Please sign off on form if you agree to: fit for duty for surgery start 4/9/25 end 6/11/25 rtw 6/12/25    -Signature page will be the first page scanned  -From your Inbasket, Highlight the patient and click Chart   -Double click the 4/2/25 Forms Completion telephone encounter  -Scroll down to the Media section   -Click the blue Hyperlink: fit for duty1 Dr. Moulton 4/2/25 AND fit for duty2 Dr. Moulton 4/2/25  -Click Acknowledge located in the top right ribbon/menu   -Drag the mouse into the blank space of the document and a + sign will appear. Left click to   electronically sign the document.  -Once signed, simply exit out of the screen and you signature will be saved.     Thank you,  Heather

## 2025-04-02 NOTE — TELEPHONE ENCOUNTER
Patient called for information on how to submit forms to be filled out, patient given fax/email. Patient provided T/A time 10-15 B Days. Patient call dropped middle of conversation. Called patient back to obtain details. Patient advised we can do these forms as a one time courtesy, patient expressed understanding.     Patient WANTS FORMS UPLOADED TO [employer].          Type of Leave: fit for duty/RTW  Reason for Leave: Surgery  Start date of leave: 4/9/25  End date of leave: 8 weeks(6/11/25) rtw 6/12/24   How many flare ups per month/length?: N  How many appts per month/length?: N  Was Fee and Turnaround info Given?: Y

## 2025-04-07 ENCOUNTER — TELEPHONE (OUTPATIENT)
Dept: PODIATRY CLINIC | Facility: CLINIC | Age: 65
End: 2025-04-07

## 2025-04-09 ENCOUNTER — TELEPHONE (OUTPATIENT)
Dept: PODIATRY CLINIC | Facility: CLINIC | Age: 65
End: 2025-04-09

## 2025-04-09 DIAGNOSIS — Z48.89 ENCOUNTER FOR POSTOPERATIVE CARE: Primary | ICD-10-CM

## 2025-04-09 PROBLEM — M19.072 ARTHRITIS OF FIRST METATARSOPHALANGEAL (MTP) JOINT OF LEFT FOOT: Status: ACTIVE | Noted: 2025-04-09

## 2025-04-09 PROBLEM — M20.12 HALLUX VALGUS (ACQUIRED), LEFT FOOT: Status: ACTIVE | Noted: 2025-04-09

## 2025-04-09 RX ORDER — ONDANSETRON 4 MG/1
4 TABLET, FILM COATED ORAL EVERY 8 HOURS PRN
Qty: 30 TABLET | Refills: 0 | Status: SHIPPED | OUTPATIENT
Start: 2025-04-09 | End: 2025-05-09

## 2025-04-09 RX ORDER — HYDROCODONE BITARTRATE AND ACETAMINOPHEN 5; 325 MG/1; MG/1
1 TABLET ORAL EVERY 6 HOURS PRN
Qty: 30 TABLET | Refills: 0 | Status: SHIPPED | OUTPATIENT
Start: 2025-04-09

## 2025-04-09 RX ORDER — IBUPROFEN 600 MG/1
600 TABLET, FILM COATED ORAL EVERY 8 HOURS
Qty: 90 TABLET | Refills: 0 | Status: SHIPPED | OUTPATIENT
Start: 2025-04-09

## 2025-04-09 NOTE — TELEPHONE ENCOUNTER
Per Nury from [place] [facility] calling stating patient had been prescribed ibuprofen, however patient is also prescribed sertraline from another provider and Nury states that the medications interfere with each other. Please call back.

## 2025-04-10 ENCOUNTER — TELEPHONE (OUTPATIENT)
Dept: ORTHOPEDICS CLINIC | Facility: CLINIC | Age: 65
End: 2025-04-10

## 2025-04-10 ENCOUNTER — TELEPHONE (OUTPATIENT)
Dept: PODIATRY CLINIC | Facility: CLINIC | Age: 65
End: 2025-04-10

## 2025-04-10 NOTE — TELEPHONE ENCOUNTER
Procedure date:4-9-2025  Procedure Laterality Anesthesia   Left 1st metatarsal phalangeal joint fusion         How are you feeling? / In a lot of pain  Any bleeding?  / no  Is the dressing dry & intact? /yes  Level of pain? /5-6  Character of pain: pressure,feels like needles,achy    Onset of pain:4-8-2025  Aggravating factors:Trying to get on the toilet  Duration of pain:fairly constant  Alleviating factors:/elevation  Are you taking the prescribed medication?    Medication Quantity Refills Start End   HYDROcodone-acetaminophen 5-325 MG Oral Tab 30 tablet 0 4/9/2025 --   Sig:     Dr. Bustos was called to use 2 norco every 6 hours and back off as soon as she can.She will add tylenol in  between the norco, I gave her the 3000mg-4000mg in a 24hour amount to have her keep tract. Doctor approved. The patient was using 1 norco every 3 hours and the Doctor said that was too much. The pharmacy told her she should not use advil and sertraline together       Are you following all of the PO instructions? / appetite good    Other Comments:I advised her to put her ice pack behind her knee 20min on 20min off mutiple times a day    Follow-up appt  date: 4/18/2025    Pt was advised if they have any concerns after hours to call our office and they would be directed to on call physician. /DONE           SHE REQUESTED MORE HYDROCODONE PLEASE. It sounds like she was only able to get 10?

## 2025-04-11 NOTE — TELEPHONE ENCOUNTER
Called pharmacy and notified per Dr Bustos' orders. They had no further questions and will discontinue the ibuprofen.

## 2025-04-18 ENCOUNTER — OFFICE VISIT (OUTPATIENT)
Dept: PODIATRY CLINIC | Facility: CLINIC | Age: 65
End: 2025-04-18

## 2025-04-18 DIAGNOSIS — Z48.89 ENCOUNTER FOR POSTOPERATIVE CARE: Primary | ICD-10-CM

## 2025-04-18 PROCEDURE — 99024 POSTOP FOLLOW-UP VISIT: CPT | Performed by: STUDENT IN AN ORGANIZED HEALTH CARE EDUCATION/TRAINING PROGRAM

## 2025-04-18 NOTE — PROGRESS NOTES
[facility] Podiatry  Progress Note      Candice Knapp is a 64 year old female.   Chief Complaint   Patient presents with    Post-Op     1st POV sx on Left foot on 4/9/25- no pain present today only when going to bed rated at 7-8/10- Some tingling in the other toes on the left foot             HPI:     Patient is a pleasant 64-year-old female that is status post left foot first MPJ fusion on April 9, 2025.  Relates that she has no pain present today.  She does admit to pain at night.  Admits to some tingling to her toes.  Denies any pedal injuries or trauma.  She has been nonweightbearing as instructed.  She is kept the dressing clean dry and intact.  Denies any shortness of breath or calf tenderness.    Allergies: Patient has no known allergies.    Current Medications[1]   Past Medical History[2]   Past Surgical History[3]   Family History[4]   Social Hx on file[5]        REVIEW OF SYSTEMS:     Denies nause, fever, chills  No calf pain  Denies chest pain or SOB      EXAM:   There were no vitals taken for this visit.  GENERAL: well developed, well nourished, in no apparent distress  EXTREMITIES:   1. Integument: Normal skin temperature and turgor.  Incision site to left first metatarsal is well coapted with sutures and no signs of dehiscence.  2. Vascular: Dorsalis pedis two out of four bilateral and posterior tibial pulses two out of   four bilateral, capillary refill normal.  Edema to left foot consistent with postoperative course   3. Musculoskeletal: All muscle groups are graded 5 out of 5 in the foot and ankle.  Tenderness to left foot incision site   4. Neurological: Normal sharp dull sensation; reflexes normal.             ASSESSMENT AND PLAN:   Diagnoses and all orders for this visit:    Encounter for postoperative care        Plan:     Patient seen and examined and findings discussed with patient.  New dressing was reapplied to the left foot incision site with well-padded dressing.  Left lower  extremity was placed in a cam boot and patient was instructed to remain nonweightbearing at all times.  Educated patient on signs of infection and DVT prophylaxis and instructed her to seek immediate medical attention.  Patient to report to clinic in 2 weeks for suture removal and x-rays.    The patient indicates understanding of these issues and agrees to the plan.        Ashlie Bustos DPM          [1]   Current Outpatient Medications   Medication Sig Dispense Refill    buPROPion  MG Oral Tablet 24 Hr Take 1 tablet (150 mg total) by mouth daily. 90 tablet 0    Phentermine HCl 37.5 MG Oral Tab Take 1 tablet (37.5 mg total) by mouth every morning before breakfast. 60 tablet 0    Tirzepatide-Weight Management (ZEPBOUND) 12.5 MG/0.5ML Subcutaneous Solution Auto-injector Inject 12.5 mg into the skin once a week. 6 mL 0    HYDROcodone-acetaminophen 5-325 MG Oral Tab Take 1 tablet by mouth every 6 (six) hours as needed for Pain. 30 tablet 0    ibuprofen 600 MG Oral Tab Take 1 tablet (600 mg total) by mouth every 8 (eight) hours. Take with food 90 tablet 0    ondansetron (ZOFRAN) 4 mg tablet Take 1 tablet (4 mg total) by mouth every 8 (eight) hours as needed for Nausea. 30 tablet 0    Tirzepatide-Weight Management (ZEPBOUND) 10 MG/0.5ML Subcutaneous Solution Auto-injector Inject 10 mg into the skin once a week. 6 mL 0    pantoprazole 20 MG Oral Tab EC Take 1 tablet (20 mg total) by mouth every morning before breakfast. 90 tablet 0    cetirizine 10 MG Oral Tab Take 1 tablet (10 mg total) by mouth every morning. 90 tablet 0    LORazepam 1 MG Oral Tab Take 1 tablet (1 mg total) by mouth 2 (two) times daily as needed for Anxiety. 60 tablet 0    magnesium oxide 400 MG Oral Tab Take 1 tablet (400 mg total) by mouth daily. 90 tablet 0    ondansetron 4 MG Oral Tablet Dispersible Take 1 tablet (4 mg total) by mouth every 8 (eight) hours as needed. 30 tablet 0    Tirzepatide-Weight Management (ZEPBOUND) 12.5 MG/0.5ML  Subcutaneous Solution Auto-injector Inject 12.5 mg into the skin once a week. 6 mL 0    sertraline 50 MG Oral Tab Take 1 tablet (50 mg total) by mouth daily. 90 tablet 3    ondansetron 4 MG Oral Tablet Dispersible Take 1 tablet (4 mg total) by mouth every 8 (eight) hours as needed. 30 tablet 0    valACYclovir 1 G Oral Tab 2 tabs bid x 1 day 4 tablet 2    Tirzepatide-Weight Management (ZEPBOUND) 7.5 MG/0.5ML Subcutaneous Solution Auto-injector Inject 7.5 mg into the skin once a week. 6 mL 0    Nystatin 635248 UNIT/GM External Powder Apply 1 Application topically 4 (four) times daily. 30 g 0    WEGOVY 1.7 MG/0.75ML Subcutaneous Solution Auto-injector Inject 0.75 mL (1.7 mg total) under the skin once a week. 6 mL 0    docusate sodium 100 MG Oral Cap Take 1 capsule (100 mg total) by mouth 2 (two) times daily. 60 capsule 2    Meloxicam 7.5 MG Oral Tab Take 1 tablet (7.5 mg total) by mouth 2 (two) times daily as needed for Pain. 60 tablet 0    COVID-19 mRNA Bival Vac Booster (MODERNA COVID-19 BIVALENT) 50 MCG/0.5ML Intramuscular Suspension Inject 0.5 mL (50 mcg total) into the muscle Prior to discharge. 0.5 mL 0    LORazepam 1 MG Oral Tab Take 1 tablet (1 mg total) by mouth 2 (two) times daily as needed for Anxiety. 60 tablet 0    LORazepam 1 MG Oral Tab Take 1 tablet (1 mg total) by mouth 2 (two) times daily as needed for Anxiety. 60 tablet 0    WEGOVY 1.7 MG/0.75ML Subcutaneous Solution Auto-injector Inject 0.75 mL (1.7 mg total) into the skin once a week.      naltrexone 50 MG Oral Tab Take 1 tablet (50 mg total) by mouth daily. 30 tablet 2    famotidine 20 MG Oral Tab Take 1 tablet (20 mg total) by mouth daily as needed. 90 tablet 3    ondansetron 4 MG Oral Tablet Dispersible Take 1 tablet (4 mg total) by mouth every 8 (eight) hours as needed for Nausea. 30 tablet 1    ERGOCALCIFEROL 1.25 MG (85226 UT) Oral Cap TAKE 1 CAPSULE (50,000 UNITS TOTAL) BY MOUTH ONCE A WEEK FOR 12 DOSES 12 capsule 1    FLOWFLEX COVID-19 AG  HOME TEST In Vitro Kit       clobetasol 0.05 % External Solution Apply 1 mL topically 2 (two) times daily. 25 mL 1    CLOTRIMAZOLE-BETAMETHASONE 1-0.05 % External Cream APPLY 1 APPLICATION TOPICALLY 2 (TWO) TIMES DAILY AS NEEDED. 15 g 1    LORAZEPAM 1 MG Oral Tab TAKE 1 TABLET BY MOUTH TWICE A DAY 60 tablet 0    Pantoprazole Sodium 40 MG Oral Tab EC Take 1 tablet (40 mg total) by mouth every morning before breakfast. 90 tablet 1    Fluticasone Furoate (FLONASE SENSIMIST) 27.5 MCG/SPRAY Nasal Suspension 2 sprays, one at a time to each nostril x 15 days. 1 each 0    clotrimazole 1 % External Cream   1   [2]   Past Medical History:   Anxiety    Anxiety and depression    Arthritis    Class 2 obesity with serious comorbidity and body mass index (BMI) of 38.0 to 38.9 in adult    Fibromyalgia    Gastroesophageal reflux disease    Skin cancer   [3]   Past Surgical History:  Procedure Laterality Date    Bunionectomy Bilateral           x2    Cholecystectomy      Open elidia.     Electrocardiogram, complete  2013    SCANNED TO MEDIA TAB - 2013    Hysterectomy  2008     partial, No BSO, no abNL paps.     Repair rotator cuff,acute     [4]   Family History  Problem Relation Age of Onset    Hypertension Mother     Other (Other) Mother         thyroid    Cancer Father     Diabetes Maternal Grandmother     Heart Disease Maternal Grandmother         CAD    Heart Disorder Maternal Grandfather 52        mi    Heart Disease Maternal Grandfather 52        CAD    Diabetes Maternal Grandfather     Other (Other) Other         hashimoto    Breast Cancer Neg     Ovarian Cancer Neg     Pancreatic Cancer Neg    [5]   Social History  Socioeconomic History    Marital status:    Tobacco Use    Smoking status: Former     Current packs/day: 0.00     Types: Cigarettes     Start date: 1970     Quit date: 1992     Years since quittin.0    Smokeless tobacco: Never   Vaping Use    Vaping status: Never Used    Substance and Sexual Activity    Alcohol use: No     Alcohol/week: 0.0 standard drinks of alcohol     Comment: Rarely    Drug use: No   Other Topics Concern    Caffeine Concern No

## 2025-04-21 ENCOUNTER — PATIENT MESSAGE (OUTPATIENT)
Dept: INTERNAL MEDICINE CLINIC | Facility: CLINIC | Age: 65
End: 2025-04-21

## 2025-04-21 DIAGNOSIS — E66.811 CLASS 1 OBESITY IN ADULT, UNSPECIFIED BMI, UNSPECIFIED OBESITY TYPE, UNSPECIFIED WHETHER SERIOUS COMORBIDITY PRESENT: Primary | ICD-10-CM

## 2025-04-21 DIAGNOSIS — E66.812 CLASS 2 DRUG-INDUCED OBESITY IN ADULT, UNSPECIFIED BMI, UNSPECIFIED WHETHER SERIOUS COMORBIDITY PRESENT: ICD-10-CM

## 2025-04-21 DIAGNOSIS — Z51.81 THERAPEUTIC DRUG MONITORING: ICD-10-CM

## 2025-04-21 DIAGNOSIS — F41.9 ANXIETY: ICD-10-CM

## 2025-04-21 DIAGNOSIS — F41.9 ANXIETY AND DEPRESSION: ICD-10-CM

## 2025-04-21 DIAGNOSIS — E66.1 CLASS 2 DRUG-INDUCED OBESITY IN ADULT, UNSPECIFIED BMI, UNSPECIFIED WHETHER SERIOUS COMORBIDITY PRESENT: ICD-10-CM

## 2025-04-21 DIAGNOSIS — E66.01 MORBID OBESITY (HCC): ICD-10-CM

## 2025-04-21 DIAGNOSIS — F32.A ANXIETY AND DEPRESSION: ICD-10-CM

## 2025-04-21 RX ORDER — TIRZEPATIDE 12.5 MG/.5ML
12.5 INJECTION, SOLUTION SUBCUTANEOUS WEEKLY
Qty: 6 ML | Refills: 0 | Status: SHIPPED | OUTPATIENT
Start: 2025-04-21

## 2025-04-21 RX ORDER — TIRZEPATIDE 12.5 MG/.5ML
12.5 INJECTION, SOLUTION SUBCUTANEOUS WEEKLY
Qty: 6 ML | Refills: 0 | OUTPATIENT
Start: 2025-04-21

## 2025-04-21 RX ORDER — PHENTERMINE HYDROCHLORIDE 37.5 MG/1
37.5 TABLET ORAL
Qty: 60 TABLET | Refills: 0 | Status: SHIPPED | OUTPATIENT
Start: 2025-04-21 | End: 2025-06-20

## 2025-04-21 RX ORDER — LORAZEPAM 1 MG/1
1 TABLET ORAL 2 TIMES DAILY PRN
Qty: 60 TABLET | Refills: 0 | OUTPATIENT
Start: 2025-04-21

## 2025-04-21 RX ORDER — BUPROPION HYDROCHLORIDE 150 MG/1
150 TABLET ORAL DAILY
Qty: 90 TABLET | Refills: 0 | Status: SHIPPED | OUTPATIENT
Start: 2025-04-21 | End: 2025-06-27

## 2025-04-23 RX ORDER — ONDANSETRON 4 MG/1
4 TABLET, FILM COATED ORAL EVERY 8 HOURS PRN
Qty: 30 TABLET | Refills: 0 | Status: SHIPPED | OUTPATIENT
Start: 2025-04-23 | End: 2025-05-23

## 2025-04-23 RX ORDER — MAGNESIUM OXIDE 400 MG/1
400 TABLET ORAL DAILY
Qty: 90 TABLET | Refills: 0 | Status: SHIPPED | OUTPATIENT
Start: 2025-04-23 | End: 2025-07-22

## 2025-04-23 NOTE — TELEPHONE ENCOUNTER
LOV 4/18/25  Last rx for Magnesium on 2/28/25 for #90 no refill  Last rx for zofran on 4/9/25 for #30 no refill.

## 2025-05-02 ENCOUNTER — OFFICE VISIT (OUTPATIENT)
Dept: PODIATRY CLINIC | Facility: CLINIC | Age: 65
End: 2025-05-02

## 2025-05-02 DIAGNOSIS — Z48.89 ENCOUNTER FOR POSTOPERATIVE CARE: Primary | ICD-10-CM

## 2025-05-02 PROCEDURE — 99024 POSTOP FOLLOW-UP VISIT: CPT | Performed by: STUDENT IN AN ORGANIZED HEALTH CARE EDUCATION/TRAINING PROGRAM

## 2025-05-02 NOTE — PROGRESS NOTES
[facility]  Progress Note      Candice Knapp is a 65 year old female.   Chief Complaint   Patient presents with    Post-Op     2nd POV - Left foot - sx was on 4/9 - pt states her foot is feeling okay - still having throbbing pain mostly at night or if she flexes her toes - some numbness and tingling - not taking any pain meds              HPI:     Patient is a pleasant 64-year-old female that is status post left foot first MPJ fusion on April 9, 2025. She is acompanied by her daughter.  Relates that she has no pain present today.  She does admit to pain at night.  Admits to some tingling to her toes.  Denies any pedal injuries or trauma.  She has been nonweightbearing as instructed.  She is kept the dressing clean dry and intact.  Denies any shortness of breath or calf tenderness.    Allergies: Patient has no known allergies.    Current Medications[1]   Past Medical History[2]   Past Surgical History[3]   Family History[4]   Social Hx on file[5]        REVIEW OF SYSTEMS:     Denies nause, fever, chills  No calf pain  Denies chest pain or SOB      EXAM:   There were no vitals taken for this visit.  GENERAL: well developed, well nourished, in no apparent distress  EXTREMITIES:   1. Integument: Normal skin temperature and turgor.  Incision site to left first metatarsal is well coapted with sutures and no signs of dehiscence.  2. Vascular: Dorsalis pedis two out of four bilateral and posterior tibial pulses two out of   four bilateral, capillary refill normal.  Edema to left foot consistent with postoperative course   3. Musculoskeletal: All muscle groups are graded 5 out of 5 in the foot and ankle.  Tenderness to left foot incision site   4. Neurological: Normal sharp dull sensation; reflexes normal.             ASSESSMENT AND PLAN:   Diagnoses and all orders for this visit:    Encounter for postoperative care          Plan:     Patient seen and examined and findings discussed with patient. All  sutures were removed from left foot inscision site with no complications and no signs of dehiscence.  New dressing was reapplied to the left foot incision site with well-padded dressing.  Left lower extremity was placed in a cam boot and patient was instructed to remain nonweightbearing at all times.  Educated patient on signs of infection and DVT prophylaxis and instructed her to seek immediate medical attention.  Patient to report to clinic in 2 weeks for suture removal and x-rays.    The patient indicates understanding of these issues and agrees to the plan.        Ashlie Bustos DPM          [1]   Current Outpatient Medications   Medication Sig Dispense Refill    magnesium oxide 400 MG Oral Tab Take 1 tablet (400 mg total) by mouth daily. 90 tablet 0    ondansetron (ZOFRAN) 4 mg tablet Take 1 tablet (4 mg total) by mouth every 8 (eight) hours as needed for Nausea. 30 tablet 0    buPROPion  MG Oral Tablet 24 Hr Take 1 tablet (150 mg total) by mouth daily. 90 tablet 0    Phentermine HCl 37.5 MG Oral Tab Take 1 tablet (37.5 mg total) by mouth every morning before breakfast. 60 tablet 0    Tirzepatide-Weight Management (ZEPBOUND) 12.5 MG/0.5ML Subcutaneous Solution Auto-injector Inject 12.5 mg into the skin once a week. 6 mL 0    HYDROcodone-acetaminophen 5-325 MG Oral Tab Take 1 tablet by mouth every 6 (six) hours as needed for Pain. 30 tablet 0    ibuprofen 600 MG Oral Tab Take 1 tablet (600 mg total) by mouth every 8 (eight) hours. Take with food 90 tablet 0    Tirzepatide-Weight Management (ZEPBOUND) 10 MG/0.5ML Subcutaneous Solution Auto-injector Inject 10 mg into the skin once a week. 6 mL 0    pantoprazole 20 MG Oral Tab EC Take 1 tablet (20 mg total) by mouth every morning before breakfast. 90 tablet 0    cetirizine 10 MG Oral Tab Take 1 tablet (10 mg total) by mouth every morning. 90 tablet 0    LORazepam 1 MG Oral Tab Take 1 tablet (1 mg total) by mouth 2 (two) times daily as needed for  Anxiety. 60 tablet 0    ondansetron 4 MG Oral Tablet Dispersible Take 1 tablet (4 mg total) by mouth every 8 (eight) hours as needed. 30 tablet 0    Tirzepatide-Weight Management (ZEPBOUND) 12.5 MG/0.5ML Subcutaneous Solution Auto-injector Inject 12.5 mg into the skin once a week. 6 mL 0    sertraline 50 MG Oral Tab Take 1 tablet (50 mg total) by mouth daily. 90 tablet 3    ondansetron 4 MG Oral Tablet Dispersible Take 1 tablet (4 mg total) by mouth every 8 (eight) hours as needed. 30 tablet 0    valACYclovir 1 G Oral Tab 2 tabs bid x 1 day 4 tablet 2    Tirzepatide-Weight Management (ZEPBOUND) 7.5 MG/0.5ML Subcutaneous Solution Auto-injector Inject 7.5 mg into the skin once a week. 6 mL 0    Nystatin 470338 UNIT/GM External Powder Apply 1 Application topically 4 (four) times daily. 30 g 0    WEGOVY 1.7 MG/0.75ML Subcutaneous Solution Auto-injector Inject 0.75 mL (1.7 mg total) under the skin once a week. 6 mL 0    docusate sodium 100 MG Oral Cap Take 1 capsule (100 mg total) by mouth 2 (two) times daily. 60 capsule 2    COVID-19 mRNA Bival Vac Booster (MODERNA COVID-19 BIVALENT) 50 MCG/0.5ML Intramuscular Suspension Inject 0.5 mL (50 mcg total) into the muscle Prior to discharge. 0.5 mL 0    LORazepam 1 MG Oral Tab Take 1 tablet (1 mg total) by mouth 2 (two) times daily as needed for Anxiety. 60 tablet 0    LORazepam 1 MG Oral Tab Take 1 tablet (1 mg total) by mouth 2 (two) times daily as needed for Anxiety. 60 tablet 0    WEGOVY 1.7 MG/0.75ML Subcutaneous Solution Auto-injector Inject 0.75 mL (1.7 mg total) into the skin once a week.      naltrexone 50 MG Oral Tab Take 1 tablet (50 mg total) by mouth daily. 30 tablet 2    famotidine 20 MG Oral Tab Take 1 tablet (20 mg total) by mouth daily as needed. 90 tablet 3    ondansetron 4 MG Oral Tablet Dispersible Take 1 tablet (4 mg total) by mouth every 8 (eight) hours as needed for Nausea. 30 tablet 1    ERGOCALCIFEROL 1.25 MG (88877 UT) Oral Cap TAKE 1 CAPSULE (50,000  UNITS TOTAL) BY MOUTH ONCE A WEEK FOR 12 DOSES 12 capsule 1    FLOWFLEX COVID-19 AG HOME TEST In Vitro Kit       clobetasol 0.05 % External Solution Apply 1 mL topically 2 (two) times daily. 25 mL 1    CLOTRIMAZOLE-BETAMETHASONE 1-0.05 % External Cream APPLY 1 APPLICATION TOPICALLY 2 (TWO) TIMES DAILY AS NEEDED. 15 g 1    LORAZEPAM 1 MG Oral Tab TAKE 1 TABLET BY MOUTH TWICE A DAY 60 tablet 0    Pantoprazole Sodium 40 MG Oral Tab EC Take 1 tablet (40 mg total) by mouth every morning before breakfast. 90 tablet 1    Fluticasone Furoate (FLONASE SENSIMIST) 27.5 MCG/SPRAY Nasal Suspension 2 sprays, one at a time to each nostril x 15 days. 1 each 0    clotrimazole 1 % External Cream   1    Meloxicam 7.5 MG Oral Tab Take 1 tablet (7.5 mg total) by mouth 2 (two) times daily as needed for Pain. (Patient not taking: Reported on 2025) 60 tablet 0   [2]   Past Medical History:   Anxiety    Anxiety and depression    Arthritis    Class 2 obesity with serious comorbidity and body mass index (BMI) of 38.0 to 38.9 in adult    Fibromyalgia    Gastroesophageal reflux disease    Skin cancer   [3]   Past Surgical History:  Procedure Laterality Date    Bunionectomy Bilateral           x2    Cholecystectomy  2005    Open elidia.     Electrocardiogram, complete  2013    SCANNED TO MEDIA TAB - 2013    Hysterectomy  2008     partial, No BSO, no abNL paps.     Repair rotator cuff,acute     [4]   Family History  Problem Relation Age of Onset    Hypertension Mother     Other (Other) Mother         thyroid    Cancer Father     Diabetes Maternal Grandmother     Heart Disease Maternal Grandmother         CAD    Heart Disorder Maternal Grandfather 52        mi    Heart Disease Maternal Grandfather 52        CAD    Diabetes Maternal Grandfather     Other (Other) Other         hashimoto    Breast Cancer Neg     Ovarian Cancer Neg     Pancreatic Cancer Neg    [5]   Social History  Socioeconomic History    Marital status:     Tobacco Use    Smoking status: Former     Current packs/day: 0.00     Types: Cigarettes     Start date: 1970     Quit date: 1992     Years since quittin.0    Smokeless tobacco: Never   Vaping Use    Vaping status: Never Used   Substance and Sexual Activity    Alcohol use: No     Alcohol/week: 0.0 standard drinks of alcohol     Comment: Rarely    Drug use: No   Other Topics Concern    Caffeine Concern No

## 2025-05-09 ENCOUNTER — HOSPITAL ENCOUNTER (OUTPATIENT)
Dept: GENERAL RADIOLOGY | Age: 65
Discharge: HOME | End: 2025-05-09
Attending: STUDENT IN AN ORGANIZED HEALTH CARE EDUCATION/TRAINING PROGRAM
Payer: COMMERCIAL

## 2025-05-09 ENCOUNTER — OFFICE VISIT (OUTPATIENT)
Dept: PODIATRY CLINIC | Facility: CLINIC | Age: 65
End: 2025-05-09

## 2025-05-09 DIAGNOSIS — Z48.89 ENCOUNTER FOR POSTOPERATIVE CARE: ICD-10-CM

## 2025-05-09 DIAGNOSIS — Z48.89 ENCOUNTER FOR POSTOPERATIVE CARE: Primary | ICD-10-CM

## 2025-05-09 PROCEDURE — 99024 POSTOP FOLLOW-UP VISIT: CPT | Performed by: STUDENT IN AN ORGANIZED HEALTH CARE EDUCATION/TRAINING PROGRAM

## 2025-05-09 PROCEDURE — 73630 X-RAY EXAM OF FOOT: CPT | Performed by: STUDENT IN AN ORGANIZED HEALTH CARE EDUCATION/TRAINING PROGRAM

## 2025-05-09 NOTE — PROGRESS NOTES
[facility]  Progress Note      Candice Knapp is a 65 year old female.   Chief Complaint   Patient presents with    Post-Op     Pt is here for 3rd post up for left foot pt got stitches removed last wk, sx was on 4/9/25, pt states feeling pt states no pain              HPI:     Patient is a pleasant 64-year-old female that is status post left foot first MPJ fusion on April 9, 2025. She is acompanied by her .  She has been nonweightbearing to the left lower extremity with a cam boot.  Denies any injuries or trauma.  Denies any pedal pain.  She is left the dressing clean dry and intact.    Allergies: Patient has no known allergies.    Current Medications[1]   Past Medical History[2]   Past Surgical History[3]   Family History[4]   Social Hx on file[5]        REVIEW OF SYSTEMS:     Denies nause, fever, chills  No calf pain  Denies chest pain or SOB      EXAM:   There were no vitals taken for this visit.  GENERAL: well developed, well nourished, in no apparent distress  EXTREMITIES:   1. Integument: Normal skin temperature and turgor.  Incision site to left first metatarsal is well coapted with  no signs of dehiscence.  2. Vascular: Dorsalis pedis two out of four bilateral and posterior tibial pulses two out of   four bilateral, capillary refill normal.  Edema to left foot consistent with postoperative course   3. Musculoskeletal: All muscle groups are graded 5 out of 5 in the foot and ankle.  No pain with palpation to left foot   4. Neurological: Normal sharp dull sensation; reflexes normal.             ASSESSMENT AND PLAN:   Diagnoses and all orders for this visit:    Encounter for postoperative care  -     XR FOOT, COMPLETE (MIN 3 VIEWS), LEFT (CPT=73630); Future            Plan:     And examined and findings discussed with patient.  Informed patient that the incision site is healing well with no signs of infection.  Advised patient that she may begin weightbearing in a cam boot for the next  3 to 4 weeks.  She may begin getting the foot wet however avoid scrubbing at incision site.  Pain medication as needed.  Keep left dorsal incision site covered with a Band-Aid.  Educated patient on signs of infection and instructed her to seek immediate medical attention.  Educated patient on signs of DVT and instructed her to seek immediate medical attention.  Follow-up in clinic in 3 weeks.  Obtain left foot x-rays today.    The patient indicates understanding of these issues and agrees to the plan.        Ashlie Bustos DPM          [1]   Current Outpatient Medications   Medication Sig Dispense Refill    magnesium oxide 400 MG Oral Tab Take 1 tablet (400 mg total) by mouth daily. 90 tablet 0    ondansetron (ZOFRAN) 4 mg tablet Take 1 tablet (4 mg total) by mouth every 8 (eight) hours as needed for Nausea. 30 tablet 0    buPROPion  MG Oral Tablet 24 Hr Take 1 tablet (150 mg total) by mouth daily. 90 tablet 0    Phentermine HCl 37.5 MG Oral Tab Take 1 tablet (37.5 mg total) by mouth every morning before breakfast. 60 tablet 0    Tirzepatide-Weight Management (ZEPBOUND) 12.5 MG/0.5ML Subcutaneous Solution Auto-injector Inject 12.5 mg into the skin once a week. 6 mL 0    HYDROcodone-acetaminophen 5-325 MG Oral Tab Take 1 tablet by mouth every 6 (six) hours as needed for Pain. 30 tablet 0    ibuprofen 600 MG Oral Tab Take 1 tablet (600 mg total) by mouth every 8 (eight) hours. Take with food 90 tablet 0    Tirzepatide-Weight Management (ZEPBOUND) 10 MG/0.5ML Subcutaneous Solution Auto-injector Inject 10 mg into the skin once a week. 6 mL 0    pantoprazole 20 MG Oral Tab EC Take 1 tablet (20 mg total) by mouth every morning before breakfast. 90 tablet 0    cetirizine 10 MG Oral Tab Take 1 tablet (10 mg total) by mouth every morning. 90 tablet 0    LORazepam 1 MG Oral Tab Take 1 tablet (1 mg total) by mouth 2 (two) times daily as needed for Anxiety. 60 tablet 0    ondansetron 4 MG Oral Tablet Dispersible  Take 1 tablet (4 mg total) by mouth every 8 (eight) hours as needed. 30 tablet 0    Tirzepatide-Weight Management (ZEPBOUND) 12.5 MG/0.5ML Subcutaneous Solution Auto-injector Inject 12.5 mg into the skin once a week. 6 mL 0    sertraline 50 MG Oral Tab Take 1 tablet (50 mg total) by mouth daily. 90 tablet 3    ondansetron 4 MG Oral Tablet Dispersible Take 1 tablet (4 mg total) by mouth every 8 (eight) hours as needed. 30 tablet 0    valACYclovir 1 G Oral Tab 2 tabs bid x 1 day 4 tablet 2    Tirzepatide-Weight Management (ZEPBOUND) 7.5 MG/0.5ML Subcutaneous Solution Auto-injector Inject 7.5 mg into the skin once a week. 6 mL 0    Nystatin 592917 UNIT/GM External Powder Apply 1 Application topically 4 (four) times daily. 30 g 0    WEGOVY 1.7 MG/0.75ML Subcutaneous Solution Auto-injector Inject 0.75 mL (1.7 mg total) under the skin once a week. 6 mL 0    docusate sodium 100 MG Oral Cap Take 1 capsule (100 mg total) by mouth 2 (two) times daily. 60 capsule 2    Meloxicam 7.5 MG Oral Tab Take 1 tablet (7.5 mg total) by mouth 2 (two) times daily as needed for Pain. 60 tablet 0    COVID-19 mRNA Bival Vac Booster (MODERNA COVID-19 BIVALENT) 50 MCG/0.5ML Intramuscular Suspension Inject 0.5 mL (50 mcg total) into the muscle Prior to discharge. 0.5 mL 0    LORazepam 1 MG Oral Tab Take 1 tablet (1 mg total) by mouth 2 (two) times daily as needed for Anxiety. 60 tablet 0    LORazepam 1 MG Oral Tab Take 1 tablet (1 mg total) by mouth 2 (two) times daily as needed for Anxiety. 60 tablet 0    WEGOVY 1.7 MG/0.75ML Subcutaneous Solution Auto-injector Inject 0.75 mL (1.7 mg total) into the skin once a week.      naltrexone 50 MG Oral Tab Take 1 tablet (50 mg total) by mouth daily. 30 tablet 2    famotidine 20 MG Oral Tab Take 1 tablet (20 mg total) by mouth daily as needed. 90 tablet 3    ondansetron 4 MG Oral Tablet Dispersible Take 1 tablet (4 mg total) by mouth every 8 (eight) hours as needed for Nausea. 30 tablet 1     ERGOCALCIFEROL 1.25 MG (98309 UT) Oral Cap TAKE 1 CAPSULE (50,000 UNITS TOTAL) BY MOUTH ONCE A WEEK FOR 12 DOSES 12 capsule 1    FLOWFLEX COVID-19 AG HOME TEST In Vitro Kit       clobetasol 0.05 % External Solution Apply 1 mL topically 2 (two) times daily. 25 mL 1    CLOTRIMAZOLE-BETAMETHASONE 1-0.05 % External Cream APPLY 1 APPLICATION TOPICALLY 2 (TWO) TIMES DAILY AS NEEDED. 15 g 1    LORAZEPAM 1 MG Oral Tab TAKE 1 TABLET BY MOUTH TWICE A DAY 60 tablet 0    Pantoprazole Sodium 40 MG Oral Tab EC Take 1 tablet (40 mg total) by mouth every morning before breakfast. 90 tablet 1    Fluticasone Furoate (FLONASE SENSIMIST) 27.5 MCG/SPRAY Nasal Suspension 2 sprays, one at a time to each nostril x 15 days. 1 each 0    clotrimazole 1 % External Cream   1   [2]   Past Medical History:   Anxiety    Anxiety and depression    Arthritis    Class 2 obesity with serious comorbidity and body mass index (BMI) of 38.0 to 38.9 in adult    Fibromyalgia    Gastroesophageal reflux disease    Skin cancer   [3]   Past Surgical History:  Procedure Laterality Date    Bunionectomy Bilateral           x2    Cholecystectomy  2005    Open elidia.     Electrocardiogram, complete  2013    SCANNED TO MEDIA TAB - 2013    Hysterectomy  2008     partial, No BSO, no abNL paps.     Repair rotator cuff,acute     [4]   Family History  Problem Relation Age of Onset    Hypertension Mother     Other (Other) Mother         thyroid    Cancer Father     Diabetes Maternal Grandmother     Heart Disease Maternal Grandmother         CAD    Heart Disorder Maternal Grandfather 52        mi    Heart Disease Maternal Grandfather 52        CAD    Diabetes Maternal Grandfather     Other (Other) Other         hashimoto    Breast Cancer Neg     Ovarian Cancer Neg     Pancreatic Cancer Neg    [5]   Social History  Socioeconomic History    Marital status:    Tobacco Use    Smoking status: Former     Current packs/day: 0.00     Types: Cigarettes      Start date: 1970     Quit date: 1992     Years since quittin.1    Smokeless tobacco: Never   Vaping Use    Vaping status: Never Used   Substance and Sexual Activity    Alcohol use: No     Alcohol/week: 0.0 standard drinks of alcohol     Comment: Rarely    Drug use: No   Other Topics Concern    Caffeine Concern No

## 2025-05-29 ENCOUNTER — TELEPHONE (OUTPATIENT)
Dept: PODIATRY CLINIC | Facility: CLINIC | Age: 65
End: 2025-05-29

## 2025-05-29 ENCOUNTER — HOSPITAL ENCOUNTER (OUTPATIENT)
Dept: GENERAL RADIOLOGY | Facility: HOSPITAL | Age: 65
Discharge: HOME | End: 2025-05-29
Attending: STUDENT IN AN ORGANIZED HEALTH CARE EDUCATION/TRAINING PROGRAM
Payer: COMMERCIAL

## 2025-05-29 DIAGNOSIS — Z48.89 ENCOUNTER FOR POSTOPERATIVE CARE: ICD-10-CM

## 2025-05-29 DIAGNOSIS — Z48.89 ENCOUNTER FOR POSTOPERATIVE CARE: Primary | ICD-10-CM

## 2025-05-30 ENCOUNTER — HOSPITAL ENCOUNTER (OUTPATIENT)
Dept: GENERAL RADIOLOGY | Age: 65
Discharge: HOME | End: 2025-05-30
Attending: STUDENT IN AN ORGANIZED HEALTH CARE EDUCATION/TRAINING PROGRAM
Payer: COMMERCIAL

## 2025-05-30 PROCEDURE — 73630 X-RAY EXAM OF FOOT: CPT | Performed by: STUDENT IN AN ORGANIZED HEALTH CARE EDUCATION/TRAINING PROGRAM

## 2025-06-02 ENCOUNTER — OFFICE VISIT (OUTPATIENT)
Dept: PODIATRY CLINIC | Facility: CLINIC | Age: 65
End: 2025-06-02
Payer: COMMERCIAL

## 2025-06-02 DIAGNOSIS — Z48.89 ENCOUNTER FOR POSTOPERATIVE CARE: Primary | ICD-10-CM

## 2025-06-02 PROCEDURE — 99024 POSTOP FOLLOW-UP VISIT: CPT | Performed by: STUDENT IN AN ORGANIZED HEALTH CARE EDUCATION/TRAINING PROGRAM

## 2025-06-02 NOTE — PROGRESS NOTES
04 [facility]  Progress Note      Candice Knapp is a 65 year old female.   Chief Complaint   Patient presents with    Post-Op     S/p L foot sx on 4/9/2025- denies pain but some of the toes are sore- also here for xray result             HPI:     Patient is a pleasant 65-year-old female who presents to clinic for follow-up of left first metatarsophalangeal joint fusion performed on April 9, 2025.  She still admits to some tenderness with walking.  Patient did obtain left foot x-rays.  She has been ambulating in supportive shoes.  Denies any pedal injuries or trauma.  Denies any signs of infection.    Allergies: Patient has no known allergies.    Current Medications[1]   Past Medical History[2]   Past Surgical History[3]   Family History[4]   Social Hx on file[5]        REVIEW OF SYSTEMS:     Denies nause, fever, chills  No calf pain  Denies chest pain or SOB      EXAM:   There were no vitals taken for this visit.  GENERAL: well developed, well nourished, in no apparent distress  EXTREMITIES:   1. Integument: Normal skin temperature and turgor.  Incision site of the left first metatarsal is healed well with no keloid formation or signs of infection  2. Vascular: Dorsalis pedis two out of four bilateral and posterior tibial pulses two out of   four bilateral, capillary refill normal.   3. Musculoskeletal: All muscle groups are graded 5 out of 5 in the foot and ankle.  Left first metatarsal is in great rectus anatomical alignment palpation to the left first metatarsal   4. Neurological: Normal sharp dull sensation; reflexes normal.             ASSESSMENT AND PLAN:   Diagnoses and all orders for this visit:    Encounter for postoperative care        Plan:     Patient seen and examined and findings discussed with patient.  Reviewed x-ray findings which show a stable fusion to the left first MPJ with hardware intact.  Advised patient to continue ambulating with supportive shoes and avoid walking  barefoot.  She may resume physical activities as tolerated.  Dispensed a compression sleeve to wear for edema control.  Follow-up in clinic in 6 to 7 weeks for repeat x-rays.    The patient indicates understanding of these issues and agrees to the plan.        Ashlie Bustos DPM        [1]   Current Outpatient Medications   Medication Sig Dispense Refill    magnesium oxide 400 MG Oral Tab Take 1 tablet (400 mg total) by mouth daily. 90 tablet 0    buPROPion  MG Oral Tablet 24 Hr Take 1 tablet (150 mg total) by mouth daily. 90 tablet 0    Phentermine HCl 37.5 MG Oral Tab Take 1 tablet (37.5 mg total) by mouth every morning before breakfast. 60 tablet 0    Tirzepatide-Weight Management (ZEPBOUND) 12.5 MG/0.5ML Subcutaneous Solution Auto-injector Inject 12.5 mg into the skin once a week. 6 mL 0    HYDROcodone-acetaminophen 5-325 MG Oral Tab Take 1 tablet by mouth every 6 (six) hours as needed for Pain. 30 tablet 0    ibuprofen 600 MG Oral Tab Take 1 tablet (600 mg total) by mouth every 8 (eight) hours. Take with food 90 tablet 0    Tirzepatide-Weight Management (ZEPBOUND) 10 MG/0.5ML Subcutaneous Solution Auto-injector Inject 10 mg into the skin once a week. 6 mL 0    pantoprazole 20 MG Oral Tab EC Take 1 tablet (20 mg total) by mouth every morning before breakfast. 90 tablet 0    cetirizine 10 MG Oral Tab Take 1 tablet (10 mg total) by mouth every morning. 90 tablet 0    LORazepam 1 MG Oral Tab Take 1 tablet (1 mg total) by mouth 2 (two) times daily as needed for Anxiety. 60 tablet 0    ondansetron 4 MG Oral Tablet Dispersible Take 1 tablet (4 mg total) by mouth every 8 (eight) hours as needed. 30 tablet 0    Tirzepatide-Weight Management (ZEPBOUND) 12.5 MG/0.5ML Subcutaneous Solution Auto-injector Inject 12.5 mg into the skin once a week. 6 mL 0    sertraline 50 MG Oral Tab Take 1 tablet (50 mg total) by mouth daily. 90 tablet 3    ondansetron 4 MG Oral Tablet Dispersible Take 1 tablet (4 mg total) by  mouth every 8 (eight) hours as needed. 30 tablet 0    valACYclovir 1 G Oral Tab 2 tabs bid x 1 day 4 tablet 2    Tirzepatide-Weight Management (ZEPBOUND) 7.5 MG/0.5ML Subcutaneous Solution Auto-injector Inject 7.5 mg into the skin once a week. 6 mL 0    Nystatin 167800 UNIT/GM External Powder Apply 1 Application topically 4 (four) times daily. 30 g 0    WEGOVY 1.7 MG/0.75ML Subcutaneous Solution Auto-injector Inject 0.75 mL (1.7 mg total) under the skin once a week. 6 mL 0    docusate sodium 100 MG Oral Cap Take 1 capsule (100 mg total) by mouth 2 (two) times daily. 60 capsule 2    Meloxicam 7.5 MG Oral Tab Take 1 tablet (7.5 mg total) by mouth 2 (two) times daily as needed for Pain. 60 tablet 0    COVID-19 mRNA Bival Vac Booster (MODERNA COVID-19 BIVALENT) 50 MCG/0.5ML Intramuscular Suspension Inject 0.5 mL (50 mcg total) into the muscle Prior to discharge. 0.5 mL 0    LORazepam 1 MG Oral Tab Take 1 tablet (1 mg total) by mouth 2 (two) times daily as needed for Anxiety. 60 tablet 0    LORazepam 1 MG Oral Tab Take 1 tablet (1 mg total) by mouth 2 (two) times daily as needed for Anxiety. 60 tablet 0    WEGOVY 1.7 MG/0.75ML Subcutaneous Solution Auto-injector Inject 0.75 mL (1.7 mg total) into the skin once a week.      naltrexone 50 MG Oral Tab Take 1 tablet (50 mg total) by mouth daily. 30 tablet 2    famotidine 20 MG Oral Tab Take 1 tablet (20 mg total) by mouth daily as needed. 90 tablet 3    ondansetron 4 MG Oral Tablet Dispersible Take 1 tablet (4 mg total) by mouth every 8 (eight) hours as needed for Nausea. 30 tablet 1    ERGOCALCIFEROL 1.25 MG (06884 UT) Oral Cap TAKE 1 CAPSULE (50,000 UNITS TOTAL) BY MOUTH ONCE A WEEK FOR 12 DOSES 12 capsule 1    FLOWFLEX COVID-19 AG HOME TEST In Vitro Kit       clobetasol 0.05 % External Solution Apply 1 mL topically 2 (two) times daily. 25 mL 1    CLOTRIMAZOLE-BETAMETHASONE 1-0.05 % External Cream APPLY 1 APPLICATION TOPICALLY 2 (TWO) TIMES DAILY AS NEEDED. 15 g 1     LORAZEPAM 1 MG Oral Tab TAKE 1 TABLET BY MOUTH TWICE A DAY 60 tablet 0    Pantoprazole Sodium 40 MG Oral Tab EC Take 1 tablet (40 mg total) by mouth every morning before breakfast. 90 tablet 1    Fluticasone Furoate (FLONASE SENSIMIST) 27.5 MCG/SPRAY Nasal Suspension 2 sprays, one at a time to each nostril x 15 days. 1 each 0    clotrimazole 1 % External Cream   1   [2]   Past Medical History:   Anxiety    Anxiety and depression    Arthritis    Class 2 obesity with serious comorbidity and body mass index (BMI) of 38.0 to 38.9 in adult    Fibromyalgia    Gastroesophageal reflux disease    Skin cancer   [3]   Past Surgical History:  Procedure Laterality Date    Bunionectomy Bilateral           x2    Cholecystectomy  2005    Open elidia.     Electrocardiogram, complete  2013    SCANNED TO MEDIA TAB - 2013    Hysterectomy  2008     partial, No BSO, no abNL paps.     Repair rotator cuff,acute     [4]   Family History  Problem Relation Age of Onset    Hypertension Mother     Other (Other) Mother         thyroid    Cancer Father     Diabetes Maternal Grandmother     Heart Disease Maternal Grandmother         CAD    Heart Disorder Maternal Grandfather 52        mi    Heart Disease Maternal Grandfather 52        CAD    Diabetes Maternal Grandfather     Other (Other) Other         hashimoto    Breast Cancer Neg     Ovarian Cancer Neg     Pancreatic Cancer Neg    [5]   Social History  Socioeconomic History    Marital status:    Tobacco Use    Smoking status: Former     Current packs/day: 0.00     Types: Cigarettes     Start date: 1970     Quit date: 1992     Years since quittin.1    Smokeless tobacco: Never   Vaping Use    Vaping status: Never Used   Substance and Sexual Activity    Alcohol use: No     Alcohol/week: 0.0 standard drinks of alcohol     Comment: Rarely    Drug use: No   Other Topics Concern    Caffeine Concern No

## 2025-06-05 NOTE — TELEPHONE ENCOUNTER
Patient called needs form filled out for patient to be able to wear gym shoes letter in chart to support. Form is in [employer] message 6/5/25. Will send email to forms dept for processing.

## 2025-06-09 ENCOUNTER — OFFICE VISIT (OUTPATIENT)
Dept: INTERNAL MEDICINE CLINIC | Facility: CLINIC | Age: 65
End: 2025-06-09
Payer: COMMERCIAL

## 2025-06-09 VITALS
SYSTOLIC BLOOD PRESSURE: 120 MMHG | DIASTOLIC BLOOD PRESSURE: 70 MMHG | BODY MASS INDEX: 24.62 KG/M2 | WEIGHT: 146 LBS | HEART RATE: 91 BPM | OXYGEN SATURATION: 97 % | HEIGHT: 64.5 IN

## 2025-06-09 DIAGNOSIS — R10.32 LEFT INGUINAL PAIN: ICD-10-CM

## 2025-06-09 DIAGNOSIS — Z00.00 ROUTINE GENERAL MEDICAL EXAMINATION AT A HEALTH CARE FACILITY: ICD-10-CM

## 2025-06-09 DIAGNOSIS — Z51.81 THERAPEUTIC DRUG MONITORING: Primary | ICD-10-CM

## 2025-06-09 DIAGNOSIS — F32.A ANXIETY AND DEPRESSION: ICD-10-CM

## 2025-06-09 DIAGNOSIS — F41.9 ANXIETY AND DEPRESSION: ICD-10-CM

## 2025-06-09 DIAGNOSIS — M25.552 PAIN OF LEFT HIP: ICD-10-CM

## 2025-06-09 LAB
BILIRUB UR QL: NEGATIVE
COLOR UR: YELLOW
GLUCOSE UR-MCNC: NORMAL MG/DL
HGB UR QL STRIP.AUTO: NEGATIVE
KETONES UR-MCNC: NEGATIVE MG/DL
LEUKOCYTE ESTERASE UR QL STRIP.AUTO: 250
PH UR: 7 [PH] (ref 5–8)
SP GR UR STRIP: 1.02 (ref 1–1.03)
UROBILINOGEN UR STRIP-ACNC: NORMAL

## 2025-06-09 PROCEDURE — 87086 URINE CULTURE/COLONY COUNT: CPT | Performed by: INTERNAL MEDICINE

## 2025-06-09 PROCEDURE — 87077 CULTURE AEROBIC IDENTIFY: CPT | Performed by: INTERNAL MEDICINE

## 2025-06-09 PROCEDURE — 81001 URINALYSIS AUTO W/SCOPE: CPT | Performed by: INTERNAL MEDICINE

## 2025-06-09 PROCEDURE — 87186 SC STD MICRODIL/AGAR DIL: CPT | Performed by: INTERNAL MEDICINE

## 2025-06-09 RX ORDER — MELOXICAM 15 MG/1
15 TABLET ORAL DAILY
Qty: 7 TABLET | Refills: 0 | Status: SHIPPED | OUTPATIENT
Start: 2025-06-09 | End: 2025-06-16

## 2025-06-09 NOTE — TELEPHONE ENCOUNTER
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 Bustos 6/9/25  -Click Acknowledge located in the top right ribbon/menu   -Drag the mouse into the blank space of the document and a + sign will appear. Left click to   electronically sign the document.  -Once signed, simply exit out of the screen and you signature will be saved.     Thank you,    Ena

## 2025-06-09 NOTE — PATIENT INSTRUCTIONS
Weight Management: Take It Off and Keep It Off  It’s easy to be motivated when you first start. The key is to stay motivated all along the way and to have realistic and achievable goals. There are things you can do to keep yourself on the path to success.  Don’t focus on daily weight gains and losses. Instead, weigh yourself no more than once a week at the same time of day. Weighing yourself each day will probably only frustrate you.    Stay motivated  Here are suggestions to keep you motivated:   Remind yourself of your goals. Post them near the refrigerator or desk where you work.  Make daily entries in your diary or journal about your activity and eating. A visual reminder of success, like a gold star, can help keep you going.  Every week, take time to look back on how much you’ve accomplished, and the changes you may have made.  Try taking a nutrition class. It can help you learn new shopping, cooking, and eating skills, and also meet new people. You might try a low-fat cooking or yoga class.  Don’t be hard on yourself or give up if you slip. Be patient. Learn from your mistakes and adjust your plan if you need to. Then get right back to it.  Be realistic about your goals. Talk with a dietitian or your healthcare provider about what goals are reasonable for you.   Believe that you can do it  How you think about yourself is just as important as what you do. If you don’t think you can succeed, chances are you won’t. Believe that you can stick to your plan and meet your goals:  If you don’t meet a goal, don’t use it as an excuse to give up on your whole plan. Adjust your goal and try again.  Try to understand your own attitude about food.  Are you subject to emotional eating?  Learn how to accept compliments. Even if you get embarrassed, just say “thank you.”  Make a list of the things that others like about you and that you like about yourself. Add something new from time to time. Keep this list to look at when  you need a lift.  Resources  The [employer] [place] on Fitness, Sports & Nutritionwww.fitness.gov  Academy of Nutrition and Dieteticswww.eatright.org  Healthfinderwww.healthfinder.gov  Date Last Reviewed: 2/4/2016  © 4098-3074 [employer]. [address] [place] [address], [place] [address]. All rights reserved. This information is not intended as a substitute for professional medical care. Always follow your healthcare professional's instructions

## 2025-06-09 NOTE — PROGRESS NOTES
Enedina Knapp is a 65 year old female.    Chief complaint:weight loss follow up , medication refill, therapeutic drug monitoring    HPI:   ENEDINA here for follow up on weight loss   Wt Readings from Last 12 Encounters:   06/09/25 146 lb (66.2 kg)   03/31/25 149 lb (67.6 kg)   01/20/25 149 lb 6.4 oz (67.8 kg)   08/19/24 161 lb (73 kg)   04/15/24 162 lb 9.6 oz (73.8 kg)   01/15/24 179 lb (81.2 kg)   10/31/23 189 lb 12.8 oz (86.1 kg)   08/15/23 192 lb 12.8 oz (87.5 kg)   07/01/23 188 lb 6.4 oz (85.5 kg)   04/05/23 187 lb 9.6 oz (85.1 kg)   11/07/22 183 lb 6.4 oz (83.2 kg)   08/08/22 188 lb 9.6 oz (85.5 kg)     Starting weight: 251 lbs     Total weight loss: 105 lbs   Medication: zepbound and phentermine   Wellbutrin       Typical diet   Breakfast: Lunch: Dinner: Snacks:   Eggs for breakfast    Doesn't always have lunch    Piece of chicken with bun       She eats around 60 g       Soda/ juice/ alcohol:   Water intake: not enough   Has not been drinking     Exercise: not regular       Side effect of medication: nausea             She is taking zofran and pantoprazole as needed       Hip pain / groin pain   Left side   Started 2 days ago   On and off   Worse with movement   Whole left side of her body hurts   Lower back pain   Constipation   No diarrhea   No urinary symptoms   Did have foot surgery recently               Current Medications[1]   Past Medical History[2]  Past Surgical History[3]     Social History:  Short Social Hx on File[4]   Family History[5]  Problem List[6]            REVIEW OF SYSTEMS:   A comprehensive 10 point review of systems was completed.  Pertinent positives and negatives noted in the the HPI          PHYSICAL EXAM:   /70   Pulse 91   Ht 5' 4.5\" (1.638 m)   Wt 146 lb (66.2 kg)   SpO2 97%   BMI 24.67 kg/m²   GENERAL: well developed, well nourished,in no apparent distress  LUNGS: clear to auscultation  CARDIO: RRR without murmur  NEURO: no gross deficits   Hip with some pain  with ROM   No abdominal tenderness no acute abdominal signs            No orders of the defined types were placed in this encounter.        ASSESSMENT/PLAN:       ICD-10-CM    1. Therapeutic drug monitoring  Z51.81 Meloxicam 15 MG Oral Tab     CBC With Differential With Platelet     Comp Metabolic Panel (14) [E]     Sed Rate, Westergren (Automated)     C-Reactive Protein [E]     Lipid Panel [E]     Hemoglobin A1C (Glycohemoglobin) [E]     Urinalysis with Culture Reflex [E]     Urinalysis with Culture Reflex [E]     XR HIP W OR WO PELVIS 2 OR 3 VIEWS, LEFT (CPT=73502) [[id number]] - Orthopedic providers only      2. Routine general medical examination at a health care facility  Z00.00 Meloxicam 15 MG Oral Tab     CBC With Differential With Platelet     Comp Metabolic Panel (14) [E]     Sed Rate, Westergren (Automated)     C-Reactive Protein [E]     Lipid Panel [E]     Hemoglobin A1C (Glycohemoglobin) [E]     Urinalysis with Culture Reflex [E]     Urinalysis with Culture Reflex [E]     XR HIP W OR WO PELVIS 2 OR 3 VIEWS, LEFT (CPT=73502) [[id number]] - Orthopedic providers only      3. Left inguinal pain  R10.32 Meloxicam 15 MG Oral Tab     CBC With Differential With Platelet     Comp Metabolic Panel (14) [E]     Sed Rate, Westergren (Automated)     C-Reactive Protein [E]     Lipid Panel [E]     Hemoglobin A1C (Glycohemoglobin) [E]     Urinalysis with Culture Reflex [E]     Urinalysis with Culture Reflex [E]     XR HIP W OR WO PELVIS 2 OR 3 VIEWS, LEFT (CPT=73742) [[id number]] - Orthopedic providers only      4. Anxiety and depression  F41.9 Meloxicam 15 MG Oral Tab    F32.A CBC With Differential With Platelet     Comp Metabolic Panel (14) [E]     Sed Rate, Westergren (Automated)     C-Reactive Protein [E]     Lipid Panel [E]     Hemoglobin A1C (Glycohemoglobin) [E]     Urinalysis with Culture Reflex [E]     Urinalysis with Culture Reflex [E]     XR HIP W OR WO PELVIS 2 OR 3 VIEWS, LEFT (CPT=73272) [[id number]] - Orthopedic  providers only      5. Pain of left hip  M25.552 Meloxicam 15 MG Oral Tab     CBC With Differential With Platelet     Comp Metabolic Panel (14) [E]     Sed Rate, Westergren (Automated)     C-Reactive Protein [E]     Lipid Panel [E]     Hemoglobin A1C (Glycohemoglobin) [E]     Urinalysis with Culture Reflex [E]     Urinalysis with Culture Reflex [E]     XR HIP W OR WO PELVIS 2 OR 3 VIEWS, LEFT (CPT=73502) [[id number]] - Orthopedic providers only         Plan:  Patient has lost 3lbs # since LOV. Patient has lost a total weight loss of 105 lbs # since first weight loss consult.  Labs reviewed   Advised to continue with low carb diet   Increase protein intake goal is 90 g per day   exercise goal is 1 hour 3 times per week cardio and strength training : to prevent muscle loss   discussed challenges with weight loss   Weigh once a week at least   Avoid sugary drinks or artificially sweetened drinks  Water intake goal is 64 oz per day   Advised to wean off phentermine   Continue zepbound   Goal is just to maintain her weight   If she continues to lose weight to contact me and switch zepbound to every 2 weeks.  Groin pain likely due to hip especially in the light of recent foot surgery   Mobic x 7 days   XR hip   Advised to complete her annual physical exam blood work   UA  If persistent groin pain and unexplained will have more evaluation / CT pelvis         Plan:  Nutrition: low carb diet   Referral RD/nutritionist : no  Behavior:  Motivational interviewing performed      Discussed strategies to overcome habits/challenges       Reviewed:  Nutrition and the importance of regular protein intake  Labs ordered:    yes   Treatment plan       Please return to the clinic if you are having persistent or worsening symptoms   Vimal Quintanilla MD,   Diplomate of the American Board of Internal Medicine  Diplomate of the American Board of Obesity Medicine            [1]   Current Outpatient Medications   Medication Sig Dispense Refill     magnesium oxide 400 MG Oral Tab Take 1 tablet (400 mg total) by mouth daily. 90 tablet 0    buPROPion  MG Oral Tablet 24 Hr Take 1 tablet (150 mg total) by mouth daily. 90 tablet 0    Phentermine HCl 37.5 MG Oral Tab Take 1 tablet (37.5 mg total) by mouth every morning before breakfast. 60 tablet 0    HYDROcodone-acetaminophen 5-325 MG Oral Tab Take 1 tablet by mouth every 6 (six) hours as needed for Pain. 30 tablet 0    ibuprofen 600 MG Oral Tab Take 1 tablet (600 mg total) by mouth every 8 (eight) hours. Take with food 90 tablet 0    Tirzepatide-Weight Management (ZEPBOUND) 10 MG/0.5ML Subcutaneous Solution Auto-injector Inject 10 mg into the skin once a week. 6 mL 0    cetirizine 10 MG Oral Tab Take 1 tablet (10 mg total) by mouth every morning. 90 tablet 0    LORazepam 1 MG Oral Tab Take 1 tablet (1 mg total) by mouth 2 (two) times daily as needed for Anxiety. 60 tablet 0    ondansetron 4 MG Oral Tablet Dispersible Take 1 tablet (4 mg total) by mouth every 8 (eight) hours as needed. 30 tablet 0    sertraline 50 MG Oral Tab Take 1 tablet (50 mg total) by mouth daily. 90 tablet 3    ondansetron 4 MG Oral Tablet Dispersible Take 1 tablet (4 mg total) by mouth every 8 (eight) hours as needed. 30 tablet 0    valACYclovir 1 G Oral Tab 2 tabs bid x 1 day 4 tablet 2    Nystatin 919851 UNIT/GM External Powder Apply 1 Application topically 4 (four) times daily. 30 g 0    docusate sodium 100 MG Oral Cap Take 1 capsule (100 mg total) by mouth 2 (two) times daily. 60 capsule 2    Meloxicam 7.5 MG Oral Tab Take 1 tablet (7.5 mg total) by mouth 2 (two) times daily as needed for Pain. 60 tablet 0    COVID-19 mRNA Bival Vac Booster (MODERNA COVID-19 BIVALENT) 50 MCG/0.5ML Intramuscular Suspension Inject 0.5 mL (50 mcg total) into the muscle Prior to discharge. 0.5 mL 0    LORazepam 1 MG Oral Tab Take 1 tablet (1 mg total) by mouth 2 (two) times daily as needed for Anxiety. 60 tablet 0    LORazepam 1 MG Oral Tab Take 1  tablet (1 mg total) by mouth 2 (two) times daily as needed for Anxiety. 60 tablet 0    naltrexone 50 MG Oral Tab Take 1 tablet (50 mg total) by mouth daily. 30 tablet 2    famotidine 20 MG Oral Tab Take 1 tablet (20 mg total) by mouth daily as needed. 90 tablet 3    ondansetron 4 MG Oral Tablet Dispersible Take 1 tablet (4 mg total) by mouth every 8 (eight) hours as needed for Nausea. 30 tablet 1    ERGOCALCIFEROL 1.25 MG (36415 UT) Oral Cap TAKE 1 CAPSULE (50,000 UNITS TOTAL) BY MOUTH ONCE A WEEK FOR 12 DOSES 12 capsule 1    FLOWFLEX COVID-19 AG HOME TEST In Vitro Kit       clobetasol 0.05 % External Solution Apply 1 mL topically 2 (two) times daily. 25 mL 1    CLOTRIMAZOLE-BETAMETHASONE 1-0.05 % External Cream APPLY 1 APPLICATION TOPICALLY 2 (TWO) TIMES DAILY AS NEEDED. 15 g 1    LORAZEPAM 1 MG Oral Tab TAKE 1 TABLET BY MOUTH TWICE A DAY 60 tablet 0    Pantoprazole Sodium 40 MG Oral Tab EC Take 1 tablet (40 mg total) by mouth every morning before breakfast. 90 tablet 1    Fluticasone Furoate (FLONASE SENSIMIST) 27.5 MCG/SPRAY Nasal Suspension 2 sprays, one at a time to each nostril x 15 days. 1 each 0    clotrimazole 1 % External Cream   1    Tirzepatide-Weight Management (ZEPBOUND) 7.5 MG/0.5ML Subcutaneous Solution Auto-injector Inject 7.5 mg into the skin once a week. (Patient not taking: Reported on 2025) 6 mL 0   [2]   Past Medical History:   Anxiety    Anxiety and depression    Arthritis    Class 2 obesity with serious comorbidity and body mass index (BMI) of 38.0 to 38.9 in adult    Fibromyalgia    Gastroesophageal reflux disease    Skin cancer   [3]   Past Surgical History:  Procedure Laterality Date    Bunionectomy Bilateral           x2    Cholecystectomy  2005    Open elidia.     Electrocardiogram, complete  2013    SCANNED TO MEDIA TAB - 2013    Hysterectomy  2008     partial, No BSO, no abNL paps.     Repair rotator cuff,acute     [4]   Social History  Socioeconomic History     Marital status:    Tobacco Use    Smoking status: Former     Current packs/day: 0.00     Types: Cigarettes     Start date: 1970     Quit date: 1992     Years since quittin.2    Smokeless tobacco: Never   Vaping Use    Vaping status: Never Used   Substance and Sexual Activity    Alcohol use: No     Alcohol/week: 0.0 standard drinks of alcohol     Comment: Rarely    Drug use: No   Other Topics Concern    Caffeine Concern No   [5]   Family History  Problem Relation Age of Onset    Hypertension Mother     Other (Other) Mother         thyroid    Cancer Father     Diabetes Maternal Grandmother     Heart Disease Maternal Grandmother         CAD    Heart Disorder Maternal Grandfather 52        mi    Heart Disease Maternal Grandfather 52        CAD    Diabetes Maternal Grandfather     Other (Other) Other         hashimoto    Breast Cancer Neg     Ovarian Cancer Neg     Pancreatic Cancer Neg    [6]   Patient Active Problem List  Diagnosis    Abnormal results of thyroid function studies    Routine general medical examination at a health care facility    Elevated blood pressure reading    Class 2 obesity with serious comorbidity and body mass index (BMI) of 38.0 to 38.9 in adult    Anxiety and depression    S/P partial hysterectomy    Morbid obesity (HCC)    Strain of lumbar region    Therapeutic drug monitoring    S/P hysterectomy    Skin cancer    History of skin cancer    Gastroesophageal reflux disease    Nausea and vomiting    Arthritis of first metatarsophalangeal (MTP) joint of left foot    Hallux valgus (acquired), left foot

## 2025-06-10 DIAGNOSIS — N39.0 URINARY TRACT INFECTION WITHOUT HEMATURIA, SITE UNSPECIFIED: Primary | ICD-10-CM

## 2025-06-10 RX ORDER — SULFAMETHOXAZOLE AND TRIMETHOPRIM 800; 160 MG/1; MG/1
1 TABLET ORAL 2 TIMES DAILY
Qty: 10 TABLET | Refills: 0 | Status: SHIPPED | OUTPATIENT
Start: 2025-06-10 | End: 2025-06-15

## 2025-06-25 DIAGNOSIS — Z51.81 THERAPEUTIC DRUG MONITORING: ICD-10-CM

## 2025-06-25 DIAGNOSIS — M21.619 BUNION: ICD-10-CM

## 2025-06-25 DIAGNOSIS — L25.9 CONTACT DERMATITIS, UNSPECIFIED CONTACT DERMATITIS TYPE, UNSPECIFIED TRIGGER: ICD-10-CM

## 2025-06-25 DIAGNOSIS — E66.1 CLASS 2 DRUG-INDUCED OBESITY IN ADULT, UNSPECIFIED BMI, UNSPECIFIED WHETHER SERIOUS COMORBIDITY PRESENT: ICD-10-CM

## 2025-06-25 DIAGNOSIS — Z13.6 SCREENING FOR HEART DISEASE: ICD-10-CM

## 2025-06-25 DIAGNOSIS — E66.9 OBESITY (BMI 30-39.9): ICD-10-CM

## 2025-06-25 DIAGNOSIS — F41.9 ANXIETY: ICD-10-CM

## 2025-06-25 DIAGNOSIS — E66.01 MORBID OBESITY (HCC): ICD-10-CM

## 2025-06-25 DIAGNOSIS — Z00.00 ANNUAL PHYSICAL EXAM: ICD-10-CM

## 2025-06-25 DIAGNOSIS — K21.9 GASTROESOPHAGEAL REFLUX DISEASE, UNSPECIFIED WHETHER ESOPHAGITIS PRESENT: ICD-10-CM

## 2025-06-25 DIAGNOSIS — Z12.31 SCREENING MAMMOGRAM, ENCOUNTER FOR: ICD-10-CM

## 2025-06-25 DIAGNOSIS — E66.812 CLASS 2 DRUG-INDUCED OBESITY IN ADULT, UNSPECIFIED BMI, UNSPECIFIED WHETHER SERIOUS COMORBIDITY PRESENT: ICD-10-CM

## 2025-06-27 RX ORDER — PANTOPRAZOLE SODIUM 20 MG/1
20 TABLET, DELAYED RELEASE ORAL
Qty: 90 TABLET | Refills: 3 | Status: SHIPPED | OUTPATIENT
Start: 2025-06-27

## 2025-06-27 RX ORDER — BUPROPION HYDROCHLORIDE 150 MG/1
150 TABLET ORAL DAILY
Qty: 90 TABLET | Refills: 3 | Status: SHIPPED | OUTPATIENT
Start: 2025-06-27

## 2025-06-27 RX ORDER — CETIRIZINE HYDROCHLORIDE 10 MG/1
10 TABLET ORAL EVERY MORNING
Qty: 90 TABLET | Refills: 3 | Status: SHIPPED | OUTPATIENT
Start: 2025-06-27

## 2025-06-27 RX ORDER — PANTOPRAZOLE SODIUM 20 MG/1
TABLET, DELAYED RELEASE ORAL
Qty: 90 TABLET | Refills: 3 | OUTPATIENT
Start: 2025-06-27

## 2025-06-27 NOTE — TELEPHONE ENCOUNTER
Refill passed per [facility] protocols.    Pantoprazole \"\" off medication list  pantoprazole 20 MG Oral Tab EC ()     Requested Prescriptions   Pending Prescriptions Disp Refills    buPROPion  MG Oral Tablet 24 Hr [Pharmacy Med Name: Bupropion Hydrochloride Er (Xl) 24hr 150 Mg Tab Lupi] 90 tablet 3     Sig: Take 1 tablet (150 mg total) by mouth daily.       Psychiatric Non-Scheduled (Anti-Anxiety) Passed - 2025  6:00 PM       cetirizine 10 MG Oral Tab [Pharmacy Med Name: Cetirizine Hydrochloride 10 Mg Tab Nort] 90 tablet 3     Sig: Take 1 tablet (10 mg total) by mouth every morning.       Allergy Medication Protocol Passed - 2025  6:00 PM       pantoprazole 20 MG Oral Tab EC 90 tablet 3     Sig: Take 1 tablet (20 mg total) by mouth every morning before breakfast.       Gastrointestional Medication Protocol Failed - 2025  6:00 PM        Failed - Medication is active on med list        Passed - In person appointment or virtual visit in the past 12 mos or appointment in next 3 mos

## 2025-06-30 NOTE — TELEPHONE ENCOUNTER
Patient called stated her job needed additional information on Return to work form with restrictions. Patient submitted email that was sent to her. Looked over email, Advised patient the additional  information her company is requesting is on the Return to work form already, the only thing missing is a photo copy of the gym shoes. Patient will reach out to her company and call back if additional information is needed. Email moved to filing cabinet.

## 2025-07-01 ENCOUNTER — PATIENT MESSAGE (OUTPATIENT)
Dept: INTERNAL MEDICINE CLINIC | Facility: CLINIC | Age: 65
End: 2025-07-01

## 2025-07-01 ENCOUNTER — LAB ENCOUNTER (OUTPATIENT)
Dept: LAB | Age: 65
End: 2025-07-01
Attending: INTERNAL MEDICINE
Payer: COMMERCIAL

## 2025-07-01 DIAGNOSIS — Z51.81 THERAPEUTIC DRUG MONITORING: ICD-10-CM

## 2025-07-01 DIAGNOSIS — F32.A ANXIETY AND DEPRESSION: ICD-10-CM

## 2025-07-01 DIAGNOSIS — F41.9 ANXIETY AND DEPRESSION: ICD-10-CM

## 2025-07-01 DIAGNOSIS — R79.89 ABNORMAL CBC: ICD-10-CM

## 2025-07-01 DIAGNOSIS — Z00.00 ROUTINE GENERAL MEDICAL EXAMINATION AT A HEALTH CARE FACILITY: ICD-10-CM

## 2025-07-01 DIAGNOSIS — M25.552 PAIN OF LEFT HIP: ICD-10-CM

## 2025-07-01 DIAGNOSIS — D64.9 ANEMIA, UNSPECIFIED TYPE: Primary | ICD-10-CM

## 2025-07-01 DIAGNOSIS — R10.32 LEFT INGUINAL PAIN: ICD-10-CM

## 2025-07-01 DIAGNOSIS — N39.0 URINARY TRACT INFECTION WITHOUT HEMATURIA, SITE UNSPECIFIED: ICD-10-CM

## 2025-07-01 LAB
ALBUMIN SERPL-MCNC: 4.4 G/DL (ref 3.2–4.8)
ALBUMIN/GLOB SERPL: 2.1 {RATIO} (ref 1–2)
ALP LIVER SERPL-CCNC: 71 U/L (ref 50–130)
ALT SERPL-CCNC: 10 U/L (ref 10–49)
ANION GAP SERPL CALC-SCNC: 8 MMOL/L (ref 0–18)
AST SERPL-CCNC: 17 U/L (ref ?–34)
BASOPHILS # BLD AUTO: 0.03 X10(3) UL (ref 0–0.2)
BASOPHILS NFR BLD AUTO: 0.8 %
BILIRUB SERPL-MCNC: 0.7 MG/DL (ref 0.2–1.1)
BILIRUB UR QL: NEGATIVE
BUN BLD-MCNC: 15 MG/DL (ref 9–23)
BUN/CREAT SERPL: 16.3 (ref 10–20)
CALCIUM BLD-MCNC: 9.2 MG/DL (ref 8.7–10.4)
CHLORIDE SERPL-SCNC: 103 MMOL/L (ref 98–112)
CHOLEST SERPL-MCNC: 186 MG/DL (ref ?–200)
CLARITY UR: CLEAR
CO2 SERPL-SCNC: 28 MMOL/L (ref 21–32)
CREAT BLD-MCNC: 0.92 MG/DL (ref 0.55–1.02)
CRP SERPL-MCNC: <0.5 MG/DL (ref ?–0.5)
DEPRECATED RDW RBC AUTO: 45.2 FL (ref 35.1–46.3)
EGFRCR SERPLBLD CKD-EPI 2021: 69 ML/MIN/1.73M2 (ref 60–?)
EOSINOPHIL # BLD AUTO: 0.12 X10(3) UL (ref 0–0.7)
EOSINOPHIL NFR BLD AUTO: 3.1 %
ERYTHROCYTE [DISTWIDTH] IN BLOOD BY AUTOMATED COUNT: 14 % (ref 11–15)
ERYTHROCYTE [SEDIMENTATION RATE] IN BLOOD: 3 MM/HR (ref 0–30)
EST. AVERAGE GLUCOSE BLD GHB EST-MCNC: 100 MG/DL (ref 68–126)
FASTING PATIENT LIPID ANSWER: YES
FASTING STATUS PATIENT QL REPORTED: YES
GLOBULIN PLAS-MCNC: 2.1 G/DL (ref 2–3.5)
GLUCOSE BLD-MCNC: 79 MG/DL (ref 70–99)
GLUCOSE UR-MCNC: NORMAL MG/DL
HBA1C MFR BLD: 5.1 % (ref ?–5.7)
HCT VFR BLD AUTO: 33.5 % (ref 35–48)
HDLC SERPL-MCNC: 63 MG/DL (ref 40–59)
HGB BLD-MCNC: 11 G/DL (ref 12–16)
HGB UR QL STRIP.AUTO: NEGATIVE
IMM GRANULOCYTES # BLD AUTO: 0 X10(3) UL (ref 0–1)
IMM GRANULOCYTES NFR BLD: 0 %
KETONES UR-MCNC: NEGATIVE MG/DL
LDLC SERPL CALC-MCNC: 106 MG/DL (ref ?–100)
LEUKOCYTE ESTERASE UR QL STRIP.AUTO: NEGATIVE
LYMPHOCYTES # BLD AUTO: 1.11 X10(3) UL (ref 1–4)
LYMPHOCYTES NFR BLD AUTO: 28.2 %
MCH RBC QN AUTO: 28.9 PG (ref 26–34)
MCHC RBC AUTO-ENTMCNC: 32.8 G/DL (ref 31–37)
MCV RBC AUTO: 88.2 FL (ref 80–100)
MONOCYTES # BLD AUTO: 0.37 X10(3) UL (ref 0.1–1)
MONOCYTES NFR BLD AUTO: 9.4 %
NEUTROPHILS # BLD AUTO: 2.3 X10 (3) UL (ref 1.5–7.7)
NEUTROPHILS # BLD AUTO: 2.3 X10(3) UL (ref 1.5–7.7)
NEUTROPHILS NFR BLD AUTO: 58.5 %
NITRITE UR QL STRIP.AUTO: NEGATIVE
NONHDLC SERPL-MCNC: 123 MG/DL (ref ?–130)
OSMOLALITY SERPL CALC.SUM OF ELEC: 288 MOSM/KG (ref 275–295)
PH UR: 8 [PH] (ref 5–8)
PLATELET # BLD AUTO: 199 10(3)UL (ref 150–450)
POTASSIUM SERPL-SCNC: 3.9 MMOL/L (ref 3.5–5.1)
PROT SERPL-MCNC: 6.5 G/DL (ref 5.7–8.2)
PROT UR-MCNC: NEGATIVE MG/DL
RBC # BLD AUTO: 3.8 X10(6)UL (ref 3.8–5.3)
SODIUM SERPL-SCNC: 139 MMOL/L (ref 136–145)
SP GR UR STRIP: 1.02 (ref 1–1.03)
TRIGL SERPL-MCNC: 95 MG/DL (ref 30–149)
UROBILINOGEN UR STRIP-ACNC: NORMAL
VLDLC SERPL CALC-MCNC: 16 MG/DL (ref 0–30)
WBC # BLD AUTO: 3.9 X10(3) UL (ref 4–11)

## 2025-07-01 PROCEDURE — 85652 RBC SED RATE AUTOMATED: CPT

## 2025-07-01 PROCEDURE — 86140 C-REACTIVE PROTEIN: CPT

## 2025-07-01 PROCEDURE — 83036 HEMOGLOBIN GLYCOSYLATED A1C: CPT

## 2025-07-01 PROCEDURE — 80053 COMPREHEN METABOLIC PANEL: CPT

## 2025-07-01 PROCEDURE — 85025 COMPLETE CBC W/AUTO DIFF WBC: CPT

## 2025-07-01 PROCEDURE — 81003 URINALYSIS AUTO W/O SCOPE: CPT

## 2025-07-01 PROCEDURE — 80061 LIPID PANEL: CPT

## 2025-07-01 PROCEDURE — 36415 COLL VENOUS BLD VENIPUNCTURE: CPT

## 2025-07-02 ENCOUNTER — LAB ENCOUNTER (OUTPATIENT)
Dept: LAB | Age: 65
End: 2025-07-02
Attending: INTERNAL MEDICINE
Payer: COMMERCIAL

## 2025-07-02 ENCOUNTER — HOSPITAL ENCOUNTER (OUTPATIENT)
Dept: GENERAL RADIOLOGY | Age: 65
Discharge: HOME | End: 2025-07-02
Attending: STUDENT IN AN ORGANIZED HEALTH CARE EDUCATION/TRAINING PROGRAM
Payer: COMMERCIAL

## 2025-07-02 DIAGNOSIS — Z48.89 ENCOUNTER FOR POSTOPERATIVE CARE: ICD-10-CM

## 2025-07-02 DIAGNOSIS — R79.89 ABNORMAL CBC: ICD-10-CM

## 2025-07-02 DIAGNOSIS — D64.9 ANEMIA, UNSPECIFIED TYPE: ICD-10-CM

## 2025-07-02 LAB
DEPRECATED HBV CORE AB SER IA-ACNC: 137 NG/ML (ref 50–306)
FOLATE SERPL-MCNC: 11.6 NG/ML (ref 5.4–?)
IRON SATN MFR SERPL: 19 % (ref 15–50)
IRON SERPL-MCNC: 49 UG/DL (ref 50–170)
TOTAL IRON BINDING CAPACITY: 257 UG/DL (ref 250–425)
TRANSFERRIN SERPL-MCNC: 202 MG/DL (ref 250–380)
VIT B12 SERPL-MCNC: 327 PG/ML (ref 211–911)

## 2025-07-02 PROCEDURE — 84466 ASSAY OF TRANSFERRIN: CPT

## 2025-07-02 PROCEDURE — 84165 PROTEIN E-PHORESIS SERUM: CPT

## 2025-07-02 PROCEDURE — 82728 ASSAY OF FERRITIN: CPT

## 2025-07-02 PROCEDURE — 82746 ASSAY OF FOLIC ACID SERUM: CPT

## 2025-07-02 PROCEDURE — 36415 COLL VENOUS BLD VENIPUNCTURE: CPT

## 2025-07-02 PROCEDURE — 86038 ANTINUCLEAR ANTIBODIES: CPT

## 2025-07-02 PROCEDURE — 86225 DNA ANTIBODY NATIVE: CPT

## 2025-07-02 PROCEDURE — 82607 VITAMIN B-12: CPT

## 2025-07-02 PROCEDURE — 86334 IMMUNOFIX E-PHORESIS SERUM: CPT

## 2025-07-02 PROCEDURE — 83540 ASSAY OF IRON: CPT

## 2025-07-02 PROCEDURE — 83521 IG LIGHT CHAINS FREE EACH: CPT

## 2025-07-02 PROCEDURE — 73630 X-RAY EXAM OF FOOT: CPT | Performed by: STUDENT IN AN ORGANIZED HEALTH CARE EDUCATION/TRAINING PROGRAM

## 2025-07-03 LAB
ALBUMIN SERPL ELPH-MCNC: 4.31 G/DL (ref 3.75–5.21)
ALBUMIN/GLOB SERPL: 1.6 {RATIO} (ref 1–2)
ALPHA1 GLOB SERPL ELPH-MCNC: 0.32 G/DL (ref 0.19–0.46)
ALPHA2 GLOB SERPL ELPH-MCNC: 0.7 G/DL (ref 0.48–1.05)
B-GLOBULIN SERPL ELPH-MCNC: 0.78 G/DL (ref 0.68–1.23)
GAMMA GLOB SERPL ELPH-MCNC: 0.89 G/DL (ref 0.62–1.7)
KAPPA LC FREE SER-MCNC: 2.13 MG/DL (ref 0.33–1.94)
KAPPA LC FREE/LAMBDA FREE SER NEPH: 1.25 {RATIO} (ref 0.26–1.65)
LAMBDA LC FREE SERPL-MCNC: 1.7 MG/DL (ref 0.57–2.63)
PROT SERPL-MCNC: 7 G/DL (ref 5.7–8.2)

## 2025-07-06 LAB
DSDNA IGG SERPL IA-ACNC: 1.5 IU/ML (ref ?–10)
ENA AB SER QL IA: 0.1 UG/L (ref ?–0.7)
ENA AB SER QL IA: NEGATIVE

## 2025-07-14 DIAGNOSIS — R79.89 ABNORMAL CBC: Primary | ICD-10-CM

## 2025-07-14 DIAGNOSIS — R76.8 ELEVATED SERUM IMMUNOGLOBULIN FREE LIGHT CHAIN LEVEL: ICD-10-CM

## 2025-07-14 DIAGNOSIS — E61.1 LOW IRON: ICD-10-CM

## 2025-08-01 ENCOUNTER — OFFICE VISIT (OUTPATIENT)
Dept: PODIATRY CLINIC | Facility: CLINIC | Age: 65
End: 2025-08-01

## 2025-08-01 DIAGNOSIS — R29.898 WEAKNESS OF LEFT LOWER EXTREMITY: ICD-10-CM

## 2025-08-01 DIAGNOSIS — M20.42 HAMMER TOE OF LEFT FOOT: ICD-10-CM

## 2025-08-01 DIAGNOSIS — M20.12 VALGUS DEFORMITY OF BOTH GREAT TOES: Primary | ICD-10-CM

## 2025-08-01 DIAGNOSIS — M20.11 VALGUS DEFORMITY OF BOTH GREAT TOES: Primary | ICD-10-CM

## 2025-08-01 DIAGNOSIS — R25.2 FOOT CRAMPS: ICD-10-CM

## 2025-08-01 DIAGNOSIS — M19.072 ARTHRITIS OF FIRST METATARSOPHALANGEAL (MTP) JOINT OF LEFT FOOT: ICD-10-CM

## 2025-08-01 PROCEDURE — 99214 OFFICE O/P EST MOD 30 MIN: CPT | Performed by: STUDENT IN AN ORGANIZED HEALTH CARE EDUCATION/TRAINING PROGRAM

## 2025-08-01 RX ORDER — MAGNESIUM OXIDE 400 MG/1
1 TABLET ORAL DAILY
COMMUNITY
Start: 2025-06-25

## 2025-08-01 RX ORDER — ONDANSETRON 4 MG/1
4 TABLET, FILM COATED ORAL EVERY 8 HOURS PRN
COMMUNITY
Start: 2025-06-25

## 2025-08-02 DIAGNOSIS — Z51.81 THERAPEUTIC DRUG MONITORING: ICD-10-CM

## 2025-08-02 DIAGNOSIS — F32.A ANXIETY AND DEPRESSION: ICD-10-CM

## 2025-08-02 DIAGNOSIS — F41.9 ANXIETY AND DEPRESSION: ICD-10-CM

## 2025-08-02 DIAGNOSIS — E66.01 MORBID OBESITY (HCC): ICD-10-CM

## 2025-08-08 RX ORDER — LORAZEPAM 1 MG/1
1 TABLET ORAL 2 TIMES DAILY PRN
Qty: 60 TABLET | Refills: 2 | Status: SHIPPED | OUTPATIENT
Start: 2025-08-08

## 2025-08-18 ENCOUNTER — TELEPHONE (OUTPATIENT)
Dept: PHYSICAL THERAPY | Facility: HOSPITAL | Age: 65
End: 2025-08-18

## 2025-08-19 ENCOUNTER — OFFICE VISIT (OUTPATIENT)
Dept: PHYSICAL THERAPY | Age: 65
End: 2025-08-19
Attending: STUDENT IN AN ORGANIZED HEALTH CARE EDUCATION/TRAINING PROGRAM

## 2025-08-19 DIAGNOSIS — R25.2 FOOT CRAMPS: ICD-10-CM

## 2025-08-19 DIAGNOSIS — K21.9 GASTROESOPHAGEAL REFLUX DISEASE, UNSPECIFIED WHETHER ESOPHAGITIS PRESENT: ICD-10-CM

## 2025-08-19 DIAGNOSIS — M20.12 VALGUS DEFORMITY OF BOTH GREAT TOES: Primary | ICD-10-CM

## 2025-08-19 DIAGNOSIS — R29.898 WEAKNESS OF LEFT LOWER EXTREMITY: ICD-10-CM

## 2025-08-19 DIAGNOSIS — M20.11 VALGUS DEFORMITY OF BOTH GREAT TOES: Primary | ICD-10-CM

## 2025-08-19 DIAGNOSIS — E66.9 OBESITY (BMI 30-39.9): ICD-10-CM

## 2025-08-19 DIAGNOSIS — M21.619 BUNION: ICD-10-CM

## 2025-08-19 DIAGNOSIS — Z51.81 THERAPEUTIC DRUG MONITORING: ICD-10-CM

## 2025-08-19 DIAGNOSIS — M19.072 ARTHRITIS OF FIRST METATARSOPHALANGEAL (MTP) JOINT OF LEFT FOOT: ICD-10-CM

## 2025-08-19 DIAGNOSIS — E66.01 MORBID OBESITY (HCC): ICD-10-CM

## 2025-08-19 DIAGNOSIS — M20.42 HAMMER TOE OF LEFT FOOT: ICD-10-CM

## 2025-08-19 PROCEDURE — 97161 PT EVAL LOW COMPLEX 20 MIN: CPT

## 2025-08-19 PROCEDURE — 97110 THERAPEUTIC EXERCISES: CPT

## 2025-08-22 ENCOUNTER — APPOINTMENT (OUTPATIENT)
Dept: PHYSICAL THERAPY | Age: 65
End: 2025-08-22
Attending: STUDENT IN AN ORGANIZED HEALTH CARE EDUCATION/TRAINING PROGRAM

## 2025-08-22 ENCOUNTER — LAB ENCOUNTER (OUTPATIENT)
Dept: LAB | Age: 65
End: 2025-08-22
Attending: INTERNAL MEDICINE

## 2025-08-22 DIAGNOSIS — R79.89 ABNORMAL CBC: ICD-10-CM

## 2025-08-22 LAB
BASOPHILS # BLD AUTO: 0.04 X10(3) UL (ref 0–0.2)
BASOPHILS NFR BLD AUTO: 0.9 %
DEPRECATED RDW RBC AUTO: 48.4 FL (ref 35.1–46.3)
EOSINOPHIL # BLD AUTO: 0.18 X10(3) UL (ref 0–0.7)
EOSINOPHIL NFR BLD AUTO: 3.9 %
ERYTHROCYTE [DISTWIDTH] IN BLOOD BY AUTOMATED COUNT: 15.7 % (ref 11–15)
HCT VFR BLD AUTO: 35 % (ref 35–48)
HGB BLD-MCNC: 12.2 G/DL (ref 12–16)
IMM GRANULOCYTES # BLD AUTO: 0 X10(3) UL (ref 0–1)
IMM GRANULOCYTES NFR BLD: 0 %
IRON SATN MFR SERPL: 36 % (ref 15–50)
IRON SERPL-MCNC: 92 UG/DL (ref 50–170)
LYMPHOCYTES # BLD AUTO: 1.47 X10(3) UL (ref 1–4)
LYMPHOCYTES NFR BLD AUTO: 31.7 %
MCH RBC QN AUTO: 31.7 PG (ref 26–34)
MCHC RBC AUTO-ENTMCNC: 34.9 G/DL (ref 31–37)
MCV RBC AUTO: 90.9 FL (ref 80–100)
MONOCYTES # BLD AUTO: 0.32 X10(3) UL (ref 0.1–1)
MONOCYTES NFR BLD AUTO: 6.9 %
NEUTROPHILS # BLD AUTO: 2.62 X10 (3) UL (ref 1.5–7.7)
NEUTROPHILS # BLD AUTO: 2.62 X10(3) UL (ref 1.5–7.7)
NEUTROPHILS NFR BLD AUTO: 56.6 %
PLATELET # BLD AUTO: 165 10(3)UL (ref 150–450)
RBC # BLD AUTO: 3.85 X10(6)UL (ref 3.8–5.3)
TOTAL IRON BINDING CAPACITY: 254 UG/DL (ref 250–425)
TRANSFERRIN SERPL-MCNC: 193 MG/DL (ref 250–380)
WBC # BLD AUTO: 4.6 X10(3) UL (ref 4–11)

## 2025-08-22 PROCEDURE — 36415 COLL VENOUS BLD VENIPUNCTURE: CPT

## 2025-08-22 PROCEDURE — 84466 ASSAY OF TRANSFERRIN: CPT

## 2025-08-22 PROCEDURE — 85025 COMPLETE CBC W/AUTO DIFF WBC: CPT

## 2025-08-22 PROCEDURE — 83540 ASSAY OF IRON: CPT

## 2025-08-25 RX ORDER — TIRZEPATIDE 10 MG/.5ML
10 INJECTION, SOLUTION SUBCUTANEOUS WEEKLY
Qty: 6 ML | Refills: 0 | Status: SHIPPED | OUTPATIENT
Start: 2025-08-25

## 2025-08-29 ENCOUNTER — APPOINTMENT (OUTPATIENT)
Dept: PHYSICAL THERAPY | Age: 65
End: 2025-08-29
Attending: STUDENT IN AN ORGANIZED HEALTH CARE EDUCATION/TRAINING PROGRAM

## (undated) DIAGNOSIS — Z00.00 ROUTINE GENERAL MEDICAL EXAMINATION AT A HEALTH CARE FACILITY: Primary | ICD-10-CM

## (undated) DIAGNOSIS — R53.83 OTHER FATIGUE: ICD-10-CM

## (undated) DIAGNOSIS — R03.0 ELEVATED BLOOD PRESSURE READING: Primary | ICD-10-CM

## (undated) DIAGNOSIS — Z51.81 THERAPEUTIC DRUG MONITORING: ICD-10-CM

## (undated) NOTE — MR AVS SNAPSHOT
[address] [facility] [phone]               Thank you for choosing us for your health care visit with Pako Miller MD.  We are glad to serve you and happy to provide you with [employer] Mcma[employer] insurance company's guidelines for prior authorization for this test.  You may be held responsible for payment in full if proper authorization is not acquired. Please contact the Patient Business Office at [phone] if you have any questions related to i discharge instructions in [facility] by going to Visits < Admission Summaries. If you've been to the Emergency Department or your doctor's office, you can view your past visit information in [employer] by going to Visits < Visit Summaries. [employer] questions?

## (undated) NOTE — MR AVS SNAPSHOT
[address] [facility] [phone] [phone]               Thank you for choosing us for your health care visit with Bijan Madden MD.  We are glad to serve you and happy to provide you with office. At that time, you will be provided with any authorization numbers or be assured that none are required. You can then schedule your appointment. Failure to obtain required authorization numbers can create reimbursement difficulties for you.     Assoc and this prescription was added. Make sure you understand how and when to take each. Commonly known as:  ATIVAN           omeprazole 20 MG Cpdr   Take 1 capsule (20 mg total) by mouth every morning.    Commonly known as:  PRILOSEC           promethazine-c

## (undated) NOTE — MR AVS SNAPSHOT
[address] at [address] [phone]               Thank you for choosing us for your health care visit with Narcisa Bellamy MD.  We are glad to serve you and happy to provide you with [facility] medications prescribed for you. Read the directions carefully, and ask your doctor or other care provider to review them with you. Where to Get Your Medications      These medications were sent to [facility]Y #[id number] Choose whole grain products Foods high in sodium   Water is best for hydration Fast food.    Eat at home when possible     Tips for increasing your physical activity – Adults who are physically active are less likely to develop some chronic diseases than ad

## (undated) NOTE — LETTER
5/9/2025          To Whom It May Concern:    Candice Knapp is currently under my medical care.    She may return to work on 05/29/2025 with no medical restrictions.    If you require additional information please contact our office.        Sincerely,    Ashlie Bustos DPM

## (undated) NOTE — LETTER
[facility]     [] NEW APPLICANT  [address] [place] [address]  [] RENEWAL            Persons with Disabilities Certification for Parking Placard  *This form is valid for three months from your physician's signature date for a Temporary Placard and six months for a Permanent Placard.    NOTE TO ALL DISABILITY LICENSE PLATE OWNERS:  If you have a disability license plate, you MUST complete the form and renew your placard.    DIRECTIONS: Both sides of this document must be signed and completed fully. All fields are required.   Applicants complete Part 1. If the applicant is a MINOR, then Parent/Guardian(s) MUST also complete Part 2. The applicant's medical professional MUSTcomplete Part 3. If the applicant is applying for meter-exempt parking, his/her medical professional MUST also complete Part 4.    PART 1:   Applicant Information (MUST have a valid [place] 's license and/or ID card)  I hereby certify  that I meet the definition of a person with a disability as provided in [facility] 5/1-159.1, and I certify  that my physical condition entitles me to the issuance of a Persons with Disabilities Parking Placard. By affixing my signature below, I understand that the parking placard may not be used unless I am the  or passenger of the vehicle.     *If a  , please provide a copy of your  showing proof of service.     Disability Parking Placard # (if any)     Full Name of Person with Disability (If minor, complete Part 2 also)    Candice Knapp  Male/Female  female  Date of Birth   4/29/1960    Valid [place] ’s License or ID Card # of Applicant                                                                                                  [place] Address  [address] [facility] [address]    Mailing Address if Different from Above   Telephone Number  [phone] (home)  Email Address   [email or web address]  ? Yes/No      Signature of Person with Disability Today’s Date  4/7/2025     PART 2:   For Parent or Legal Guardian (MUST have a valid 's license and/or ID card)  I hereby certify that the above applicant is a minor and I have primary responsibility for his/her transportation. By affixing my signature below, I understand that the disability placard is issued to the person with the disability and may not be used unless I am transporting the disabled person in the vehicle.     Name of Parent or Legal Guardian   Relationship to Person with Disability   Valid [place] ’s License or ID Card #          [place] Address Apt/Unit# City State Zip   Telephone Number Email Address   Signature of Parent or Legal Guardian Today’s Date  4/7/2025     Warning: Any misuse of the disability parking placard/plates or making a false application may result in the revocation of the placard, a 12-month suspension or revocation of your drivers license, and a fine of up to $1,000.    Temporary Disabled Parking Placard Applications -- May be taken to any [facility] facility or mailed in.  Permanent Disables Parking Placard Applications -- MUST be mailed to the following address:  , Persons with Disabilities Placard Unit, [address] [place] [address], [address], [place] [address].    *If you have a permanent disability placard and would like a Persons with Disabilities License Plate, please visit your local  facility to apply. You will need your permanent placard number and current plate number or KARINA.     Please complete Page 2 to ensure timely processing.    Printed by authority of the [facility]. July 2021 -- 1 -- VSD 62.28          Part 3: Medical Eligibility Standards and Medical Professionals Certification  As the medical professional(s) executing this document and verifying the nature of the applicant's disability, I understand that making a false representation of a person's disability for  the purposes of obtaining any type of a disabled parking placard may result in suspension or revocation of my license and a fine of up to $1,000. As a licensed physician, advanced practice nurse, optometrist, chiropractor or physician's assistant, I certify the applicant has a condition that constitutes him/her as a person with disabilities.   Length of Disability: (Check one)   [x]   Temporary Disability; the duration of this disability is _____3________________ (maximum 6 months)  []   Permanent Disability  []   Meter-Exempt Disability (Must complete and sign Part 4 also.)  Check all that apply (MUST check at least one):  []  Is restricted by a lung disease to such a degree that the person’s forced (respiratory) expiratory volume (FEV) for 1 second, when measured by spirometry, is less than 1 liter.  []   Uses a portable oxygen device.  []   Has a Class III or Class IV cardiac condition according to the standards set by the American Heart Association.  [x]   Cannot walk without the use of or assistance from a wheelchair, a walker, a crutch, a brace, a prosthetic device or another person.  []   Is severely limited in the ability to walk due to an arthritic, neurological, oncological or orthopedic condition.  []   Cannot walk 200 feet without stopping to rest because of one of the above five conditions.  Check all that apply: (MUST check at least one diagnosis):  []Amputation of extremity(s)_________________ [x] Arthritis of first metatarsophalangeal (MTP) joint of left foot.  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     Medical Professional’s Printed Name*   Ashlie Bustos DPM  Specialty     Office Address   [facility], [address] [address]  Dept: [phone]  Dept Fax: [phone]   Medical Professional’s Signature   State Professional License Number (NOT NPI#)  [id number] Today’s Date                  4/7/2025    Signature of Collaborating/Supervising Physician (if signed above by resident/assistant) Supervising State Professional License Number             PART 4: Medical Eligibility for Meter-Exempt Parking  The meter-exempt parking certification must be completed only when the applicant qualifies. To qualify, the applicant MUST have a VALID  [place] 's license, have an ambulatory disability described in Part 3, and also have one of the following conditions listed below.  Economic need is not a consideration for meter-exempt parking    The applicant is eligible for meter-exempt parking as provided by statue due to the following PERMANENT medical condition or disability:    Check all that apply:  [] Cannot manage, manipulate, or insert coins, or obtain tickets in parking meters/ticket machines due to lack of fine motor control of BOTH hands.  [] Cannot reach above his/her head to a height of 42 inches from the ground due to a lack of finger, hand or upper-extremity strength or mobility.  [] Cannot approach a parking meter due to his/her use of a wheelchair or other device for mobility.  [] Cannot walk more than 20 feet due to an orthopedic, neurological, cardiovascular, or lung condition in which the degree of debilitation is so severe that it almost completely impedes the ability to walk.  [] Missing a hand(s) or arm(s) or has permanently lost the use of a hand or arm.  [] Patient is under 18 years of age and incapable of driving.     Medical Professional’s Signature State Professional License Number (NOT NPI#)   Today’s Date                  4/7/2025    Signature of Collaborating/Supervising Physician (if signed  above by resident/assistant) Supervising State Professional License Number     FOR  OFFICE USE ONLY     Parking Placard Number:  Expiration Date:   Issued By: Issued Date:

## (undated) NOTE — LETTER
6/2/2025          To Whom It May Concern:    Candice Knapp is currently under my medical care and may not return to work at this time.      She may return to work on 6/28/2025.  Activity is restricted as follows: Please allow to wear gym shoes for 6 weeks.    If you require additional information please contact our office.        Sincerely,    Ashlie Bustos DPM

## (undated) NOTE — LETTER
5/11/2021    Froilan Moeller        [address] [place] [address]        [facility] [address]            Dear Froilan Moeller,      Our records indicate that you are due for an appointment for a EGD or Upper Endoscopy in August 2021, or shortly there after, with

## (undated) NOTE — LETTER
11/10/17  WEIGHT LOSS PROGRAM CONSENT FORM      I, ________________________________, authorize Dr. Fabian Benoit and associated health care providers, to help me in my weight-reduction efforts.  I understand that my program may consist of a balanced-deficit successful. I also understand that obesity is a chronic, lifelong condition that may require changes in eating habits and permanent changes in behavior to be treated successfully.     I have read and fully understand this consent form and it has been fully